# Patient Record
Sex: FEMALE | Race: WHITE | URBAN - METROPOLITAN AREA
[De-identification: names, ages, dates, MRNs, and addresses within clinical notes are randomized per-mention and may not be internally consistent; named-entity substitution may affect disease eponyms.]

---

## 2022-12-22 ENCOUNTER — OFFICE VISIT (OUTPATIENT)
Dept: FAMILY MEDICINE CLINIC | Facility: CLINIC | Age: 57
End: 2022-12-22
Payer: MEDICAID

## 2022-12-22 VITALS
HEART RATE: 73 BPM | WEIGHT: 170 LBS | SYSTOLIC BLOOD PRESSURE: 128 MMHG | DIASTOLIC BLOOD PRESSURE: 75 MMHG | TEMPERATURE: 99 F | RESPIRATION RATE: 18 BRPM | OXYGEN SATURATION: 98 % | BODY MASS INDEX: 32.1 KG/M2 | HEIGHT: 61 IN

## 2022-12-22 DIAGNOSIS — N39.0 URINARY TRACT INFECTION WITHOUT HEMATURIA, SITE UNSPECIFIED: Primary | ICD-10-CM

## 2022-12-22 LAB
APPEARANCE: YELLOW
BILIRUBIN: NEGATIVE
GLUCOSE (URINE DIPSTICK): NEGATIVE MG/DL
KETONES (URINE DIPSTICK): NEGATIVE MG/DL
MULTISTIX LOT#: ABNORMAL NUMERIC
NITRITE, URINE: NEGATIVE
OCCULT BLOOD: NEGATIVE
PH, URINE: 7 (ref 4.5–8)
PROTEIN (URINE DIPSTICK): NEGATIVE MG/DL
SPECIFIC GRAVITY: 1.02 (ref 1–1.03)
URINE-COLOR: CLEAR
UROBILINOGEN,SEMI-QN: 0.2 MG/DL (ref 0–1.9)

## 2022-12-22 PROCEDURE — 87186 SC STD MICRODIL/AGAR DIL: CPT | Performed by: PHYSICIAN ASSISTANT

## 2022-12-22 PROCEDURE — 87086 URINE CULTURE/COLONY COUNT: CPT | Performed by: PHYSICIAN ASSISTANT

## 2022-12-22 PROCEDURE — 3074F SYST BP LT 130 MM HG: CPT | Performed by: PHYSICIAN ASSISTANT

## 2022-12-22 PROCEDURE — 3008F BODY MASS INDEX DOCD: CPT | Performed by: PHYSICIAN ASSISTANT

## 2022-12-22 PROCEDURE — 3078F DIAST BP <80 MM HG: CPT | Performed by: PHYSICIAN ASSISTANT

## 2022-12-22 PROCEDURE — 87077 CULTURE AEROBIC IDENTIFY: CPT | Performed by: PHYSICIAN ASSISTANT

## 2022-12-22 PROCEDURE — 81003 URINALYSIS AUTO W/O SCOPE: CPT | Performed by: PHYSICIAN ASSISTANT

## 2022-12-22 PROCEDURE — 99203 OFFICE O/P NEW LOW 30 MIN: CPT | Performed by: PHYSICIAN ASSISTANT

## 2022-12-22 RX ORDER — MULTIVIT-MIN/IRON FUM/FOLIC AC 7.5 MG-4
1 TABLET ORAL DAILY
COMMUNITY

## 2022-12-22 RX ORDER — TRAZODONE HYDROCHLORIDE 300 MG/1
50 TABLET ORAL NIGHTLY
COMMUNITY

## 2022-12-22 RX ORDER — ZINC SULFATE 50(220)MG
25 CAPSULE ORAL DAILY
COMMUNITY

## 2022-12-22 RX ORDER — LANSOPRAZOLE 15 MG/1
15 CAPSULE, DELAYED RELEASE ORAL DAILY
COMMUNITY

## 2022-12-22 RX ORDER — RIBOFLAVIN (VITAMIN B2) 100 MG
100 TABLET ORAL DAILY
COMMUNITY

## 2022-12-22 RX ORDER — ALPRAZOLAM 0.5 MG/1
0.5 TABLET ORAL AS NEEDED
COMMUNITY

## 2022-12-22 RX ORDER — NITROFURANTOIN 25; 75 MG/1; MG/1
100 CAPSULE ORAL 2 TIMES DAILY
Qty: 10 CAPSULE | Refills: 0 | Status: SHIPPED | OUTPATIENT
Start: 2022-12-22 | End: 2022-12-27

## 2022-12-22 RX ORDER — MONTELUKAST SODIUM 10 MG/1
10 TABLET ORAL NIGHTLY
COMMUNITY

## 2022-12-22 RX ORDER — LEVOTHYROXINE SODIUM 0.1 MG/1
100 TABLET ORAL
COMMUNITY

## 2022-12-22 RX ORDER — FLUTICASONE PROPIONATE AND SALMETEROL 250; 50 UG/1; UG/1
1 POWDER RESPIRATORY (INHALATION) 2 TIMES DAILY
COMMUNITY

## 2022-12-22 RX ORDER — ESTRADIOL 0.03 MG/D
1 FILM, EXTENDED RELEASE TRANSDERMAL
COMMUNITY

## 2022-12-22 RX ORDER — DICYCLOMINE HYDROCHLORIDE 10 MG/5ML
20 SOLUTION ORAL
COMMUNITY

## 2022-12-22 RX ORDER — OMEGA-3S/DHA/EPA/FISH OIL/D3 1150-1000
LIQUID (ML) ORAL DAILY
COMMUNITY

## 2022-12-22 RX ORDER — LOSARTAN POTASSIUM 50 MG/1
TABLET ORAL 2 TIMES DAILY
COMMUNITY

## 2022-12-22 RX ORDER — VALACYCLOVIR HYDROCHLORIDE 1 G/1
1 TABLET, FILM COATED ORAL EVERY 12 HOURS SCHEDULED
COMMUNITY

## 2022-12-22 RX ORDER — ESCITALOPRAM OXALATE 20 MG/1
20 TABLET ORAL DAILY
COMMUNITY

## 2022-12-22 RX ORDER — FEXOFENADINE HCL 180 MG/1
180 TABLET ORAL 2 TIMES DAILY
COMMUNITY

## 2022-12-22 NOTE — PATIENT INSTRUCTIONS
1   Preliminary urinalysis with signs of infection. Urine culture pending, results will be available in 2-3 days. 2.  Macrobid (nitrofurantoin) 100 mg twice daily for 5 days.

## 2022-12-27 DIAGNOSIS — N30.01 ACUTE CYSTITIS WITH HEMATURIA: Primary | ICD-10-CM

## 2022-12-27 RX ORDER — NITROFURANTOIN 25; 75 MG/1; MG/1
100 CAPSULE ORAL 2 TIMES DAILY
Qty: 4 CAPSULE | Refills: 0 | Status: SHIPPED | OUTPATIENT
Start: 2022-12-27 | End: 2022-12-29

## 2023-01-08 ENCOUNTER — OFFICE VISIT (OUTPATIENT)
Dept: FAMILY MEDICINE CLINIC | Facility: CLINIC | Age: 58
End: 2023-01-08
Payer: MEDICAID

## 2023-01-08 VITALS
OXYGEN SATURATION: 98 % | HEIGHT: 61 IN | RESPIRATION RATE: 16 BRPM | TEMPERATURE: 97 F | SYSTOLIC BLOOD PRESSURE: 108 MMHG | WEIGHT: 162 LBS | BODY MASS INDEX: 30.58 KG/M2 | DIASTOLIC BLOOD PRESSURE: 70 MMHG | HEART RATE: 80 BPM

## 2023-01-08 DIAGNOSIS — R39.9 UTI SYMPTOMS: Primary | ICD-10-CM

## 2023-01-08 LAB
APPEARANCE: CLEAR
BILIRUBIN: NEGATIVE
GLUCOSE (URINE DIPSTICK): NEGATIVE MG/DL
KETONES (URINE DIPSTICK): 15 MG/DL
MULTISTIX LOT#: ABNORMAL NUMERIC
NITRITE, URINE: NEGATIVE
PH, URINE: 5.5 (ref 4.5–8)
PROTEIN (URINE DIPSTICK): 100 MG/DL
SPECIFIC GRAVITY: 1.02 (ref 1–1.03)
URINE-COLOR: YELLOW
UROBILINOGEN,SEMI-QN: 0.2 MG/DL (ref 0–1.9)

## 2023-01-08 PROCEDURE — 3008F BODY MASS INDEX DOCD: CPT | Performed by: NURSE PRACTITIONER

## 2023-01-08 PROCEDURE — 87186 SC STD MICRODIL/AGAR DIL: CPT | Performed by: NURSE PRACTITIONER

## 2023-01-08 PROCEDURE — 99213 OFFICE O/P EST LOW 20 MIN: CPT | Performed by: NURSE PRACTITIONER

## 2023-01-08 PROCEDURE — 87077 CULTURE AEROBIC IDENTIFY: CPT | Performed by: NURSE PRACTITIONER

## 2023-01-08 PROCEDURE — 3078F DIAST BP <80 MM HG: CPT | Performed by: NURSE PRACTITIONER

## 2023-01-08 PROCEDURE — 87086 URINE CULTURE/COLONY COUNT: CPT | Performed by: NURSE PRACTITIONER

## 2023-01-08 PROCEDURE — 3074F SYST BP LT 130 MM HG: CPT | Performed by: NURSE PRACTITIONER

## 2023-01-08 PROCEDURE — 81003 URINALYSIS AUTO W/O SCOPE: CPT | Performed by: NURSE PRACTITIONER

## 2023-01-08 RX ORDER — CIPROFLOXACIN 500 MG/1
500 TABLET, FILM COATED ORAL 2 TIMES DAILY
Qty: 14 TABLET | Refills: 0 | Status: SHIPPED | OUTPATIENT
Start: 2023-01-08 | End: 2023-01-15

## 2023-01-08 RX ORDER — PHENAZOPYRIDINE HYDROCHLORIDE 200 MG/1
200 TABLET, FILM COATED ORAL 3 TIMES DAILY PRN
Qty: 15 TABLET | Refills: 0 | Status: SHIPPED | OUTPATIENT
Start: 2023-01-08 | End: 2023-01-13

## 2023-01-08 RX ORDER — FLUCONAZOLE 150 MG/1
150 TABLET ORAL ONCE
Qty: 1 TABLET | Refills: 0 | Status: SHIPPED | OUTPATIENT
Start: 2023-01-08 | End: 2023-01-08

## 2023-02-01 ENCOUNTER — OFFICE VISIT (OUTPATIENT)
Dept: FAMILY MEDICINE CLINIC | Facility: CLINIC | Age: 58
End: 2023-02-01
Payer: MEDICAID

## 2023-02-01 VITALS
HEIGHT: 61 IN | DIASTOLIC BLOOD PRESSURE: 70 MMHG | BODY MASS INDEX: 29.45 KG/M2 | RESPIRATION RATE: 16 BRPM | TEMPERATURE: 97 F | OXYGEN SATURATION: 99 % | HEART RATE: 99 BPM | SYSTOLIC BLOOD PRESSURE: 126 MMHG | WEIGHT: 156 LBS

## 2023-02-01 DIAGNOSIS — F43.9 STRESS AT HOME: ICD-10-CM

## 2023-02-01 DIAGNOSIS — E03.9 HYPOTHYROIDISM, UNSPECIFIED TYPE: ICD-10-CM

## 2023-02-01 DIAGNOSIS — F33.1 MAJOR DEPRESSIVE DISORDER, RECURRENT EPISODE, MODERATE WITH ANXIOUS DISTRESS (HCC): ICD-10-CM

## 2023-02-01 DIAGNOSIS — Z78.0 POSTMENOPAUSAL: ICD-10-CM

## 2023-02-01 DIAGNOSIS — Z00.00 ROUTINE GENERAL MEDICAL EXAMINATION AT A HEALTH CARE FACILITY: Primary | ICD-10-CM

## 2023-02-01 DIAGNOSIS — J45.40 MODERATE PERSISTENT ASTHMA WITHOUT COMPLICATION: ICD-10-CM

## 2023-02-01 DIAGNOSIS — I10 ESSENTIAL HYPERTENSION: ICD-10-CM

## 2023-02-01 PROBLEM — K21.9 GASTROESOPHAGEAL REFLUX DISEASE: Status: ACTIVE | Noted: 2023-02-01

## 2023-02-01 PROBLEM — H69.82 DYSFUNCTION OF LEFT EUSTACHIAN TUBE: Status: ACTIVE | Noted: 2023-02-01

## 2023-02-01 PROBLEM — J30.81 ALLERGIC RHINITIS DUE TO ANIMAL HAIR AND DANDER: Status: ACTIVE | Noted: 2023-02-01

## 2023-02-01 PROBLEM — M26.629 ARTHRALGIA OF TEMPOROMANDIBULAR JOINT: Status: ACTIVE | Noted: 2023-02-01

## 2023-02-01 PROBLEM — H69.92 DYSFUNCTION OF LEFT EUSTACHIAN TUBE: Status: ACTIVE | Noted: 2023-02-01

## 2023-02-01 PROBLEM — J30.1 ALLERGIC RHINITIS DUE TO POLLEN: Status: ACTIVE | Noted: 2023-02-01

## 2023-02-01 PROBLEM — J31.0 CHRONIC RHINITIS: Status: ACTIVE | Noted: 2023-02-01

## 2023-02-01 PROCEDURE — 3008F BODY MASS INDEX DOCD: CPT | Performed by: FAMILY MEDICINE

## 2023-02-01 PROCEDURE — 99386 PREV VISIT NEW AGE 40-64: CPT | Performed by: FAMILY MEDICINE

## 2023-02-01 PROCEDURE — 3074F SYST BP LT 130 MM HG: CPT | Performed by: FAMILY MEDICINE

## 2023-02-01 PROCEDURE — 3078F DIAST BP <80 MM HG: CPT | Performed by: FAMILY MEDICINE

## 2023-02-01 PROCEDURE — 99214 OFFICE O/P EST MOD 30 MIN: CPT | Performed by: FAMILY MEDICINE

## 2023-02-01 RX ORDER — FLUTICASONE PROPIONATE 50 MCG
2 SPRAY, SUSPENSION (ML) NASAL DAILY
COMMUNITY

## 2023-02-01 RX ORDER — BUPROPION HYDROCHLORIDE 150 MG/1
150 TABLET, EXTENDED RELEASE ORAL 2 TIMES DAILY
COMMUNITY
End: 2023-02-01

## 2023-02-01 RX ORDER — NICOTINE POLACRILEX 4 MG/1
GUM, CHEWING ORAL
COMMUNITY
Start: 2020-10-29 | End: 2023-02-01 | Stop reason: CLARIF

## 2023-02-01 RX ORDER — FLUTICASONE PROPIONATE AND SALMETEROL 250; 50 UG/1; UG/1
1 POWDER RESPIRATORY (INHALATION) EVERY 12 HOURS SCHEDULED
COMMUNITY

## 2023-02-01 RX ORDER — LOSARTAN POTASSIUM 50 MG/1
50 TABLET ORAL 2 TIMES DAILY
Qty: 180 TABLET | Refills: 0 | Status: SHIPPED | OUTPATIENT
Start: 2023-02-01

## 2023-02-01 RX ORDER — BUPROPION HYDROCHLORIDE 300 MG/1
300 TABLET ORAL DAILY
Qty: 30 TABLET | Refills: 1 | Status: SHIPPED | OUTPATIENT
Start: 2023-02-01 | End: 2023-02-01

## 2023-02-01 RX ORDER — TRAZODONE HYDROCHLORIDE 150 MG/1
150 TABLET ORAL NIGHTLY
Qty: 30 TABLET | Refills: 1 | Status: SHIPPED | OUTPATIENT
Start: 2023-02-01

## 2023-02-01 RX ORDER — LEVOTHYROXINE SODIUM 0.1 MG/1
100 TABLET ORAL
Qty: 30 TABLET | Refills: 1 | Status: SHIPPED | OUTPATIENT
Start: 2023-02-01

## 2023-02-01 RX ORDER — BUPROPION HYDROCHLORIDE 150 MG/1
150 TABLET ORAL DAILY
COMMUNITY
End: 2023-02-19 | Stop reason: CLARIF

## 2023-02-02 RX ORDER — TRAZODONE HYDROCHLORIDE 150 MG/1
TABLET ORAL
Qty: 90 TABLET | Refills: 0 | OUTPATIENT
Start: 2023-02-02

## 2023-02-02 RX ORDER — LEVOTHYROXINE SODIUM 0.1 MG/1
TABLET ORAL
Qty: 90 TABLET | Refills: 0 | OUTPATIENT
Start: 2023-02-02

## 2023-02-10 ENCOUNTER — LAB ENCOUNTER (OUTPATIENT)
Dept: LAB | Age: 58
End: 2023-02-10
Attending: FAMILY MEDICINE
Payer: MEDICAID

## 2023-02-10 DIAGNOSIS — I10 ESSENTIAL HYPERTENSION: ICD-10-CM

## 2023-02-10 DIAGNOSIS — E03.9 HYPOTHYROIDISM, UNSPECIFIED TYPE: ICD-10-CM

## 2023-02-10 LAB
ALBUMIN SERPL-MCNC: 3.7 G/DL (ref 3.4–5)
ALBUMIN/GLOB SERPL: 1.2 {RATIO} (ref 1–2)
ALP LIVER SERPL-CCNC: 81 U/L
ALT SERPL-CCNC: 33 U/L
ANION GAP SERPL CALC-SCNC: 8 MMOL/L (ref 0–18)
AST SERPL-CCNC: 17 U/L (ref 15–37)
BASOPHILS # BLD AUTO: 0.06 X10(3) UL (ref 0–0.2)
BASOPHILS NFR BLD AUTO: 1.1 %
BILIRUB SERPL-MCNC: 0.5 MG/DL (ref 0.1–2)
BUN BLD-MCNC: 9 MG/DL (ref 7–18)
CALCIUM BLD-MCNC: 9.7 MG/DL (ref 8.5–10.1)
CHLORIDE SERPL-SCNC: 107 MMOL/L (ref 98–112)
CHOLEST SERPL-MCNC: 226 MG/DL (ref ?–200)
CO2 SERPL-SCNC: 27 MMOL/L (ref 21–32)
CREAT BLD-MCNC: 1.07 MG/DL
EOSINOPHIL # BLD AUTO: 0.11 X10(3) UL (ref 0–0.7)
EOSINOPHIL NFR BLD AUTO: 2 %
ERYTHROCYTE [DISTWIDTH] IN BLOOD BY AUTOMATED COUNT: 13.2 %
FASTING PATIENT LIPID ANSWER: YES
FASTING STATUS PATIENT QL REPORTED: YES
GFR SERPLBLD BASED ON 1.73 SQ M-ARVRAT: 60 ML/MIN/1.73M2 (ref 60–?)
GLOBULIN PLAS-MCNC: 3.2 G/DL (ref 2.8–4.4)
GLUCOSE BLD-MCNC: 102 MG/DL (ref 70–99)
HCT VFR BLD AUTO: 39.1 %
HDLC SERPL-MCNC: 56 MG/DL (ref 40–59)
HGB BLD-MCNC: 13.3 G/DL
IMM GRANULOCYTES # BLD AUTO: 0.01 X10(3) UL (ref 0–1)
IMM GRANULOCYTES NFR BLD: 0.2 %
LDLC SERPL CALC-MCNC: 148 MG/DL (ref ?–100)
LYMPHOCYTES # BLD AUTO: 0.98 X10(3) UL (ref 1–4)
LYMPHOCYTES NFR BLD AUTO: 17.5 %
MCH RBC QN AUTO: 31.4 PG (ref 26–34)
MCHC RBC AUTO-ENTMCNC: 34 G/DL (ref 31–37)
MCV RBC AUTO: 92.4 FL
MONOCYTES # BLD AUTO: 0.33 X10(3) UL (ref 0.1–1)
MONOCYTES NFR BLD AUTO: 5.9 %
NEUTROPHILS # BLD AUTO: 4.11 X10 (3) UL (ref 1.5–7.7)
NEUTROPHILS # BLD AUTO: 4.11 X10(3) UL (ref 1.5–7.7)
NEUTROPHILS NFR BLD AUTO: 73.3 %
NONHDLC SERPL-MCNC: 170 MG/DL (ref ?–130)
OSMOLALITY SERPL CALC.SUM OF ELEC: 293 MOSM/KG (ref 275–295)
PLATELET # BLD AUTO: 259 10(3)UL (ref 150–450)
POTASSIUM SERPL-SCNC: 4.1 MMOL/L (ref 3.5–5.1)
PROT SERPL-MCNC: 6.9 G/DL (ref 6.4–8.2)
RBC # BLD AUTO: 4.23 X10(6)UL
SODIUM SERPL-SCNC: 142 MMOL/L (ref 136–145)
T4 FREE SERPL-MCNC: 1.4 NG/DL (ref 0.8–1.7)
THYROGLOB SERPL-MCNC: <15 U/ML (ref ?–60)
THYROPEROXIDASE AB SERPL-ACNC: 60 U/ML (ref ?–60)
TRIGL SERPL-MCNC: 125 MG/DL (ref 30–149)
TSI SER-ACNC: 0.15 MIU/ML (ref 0.36–3.74)
VLDLC SERPL CALC-MCNC: 24 MG/DL (ref 0–30)
WBC # BLD AUTO: 5.6 X10(3) UL (ref 4–11)

## 2023-02-10 PROCEDURE — 84439 ASSAY OF FREE THYROXINE: CPT

## 2023-02-10 PROCEDURE — 36415 COLL VENOUS BLD VENIPUNCTURE: CPT

## 2023-02-10 PROCEDURE — 84443 ASSAY THYROID STIM HORMONE: CPT

## 2023-02-10 PROCEDURE — 80061 LIPID PANEL: CPT

## 2023-02-10 PROCEDURE — 86376 MICROSOMAL ANTIBODY EACH: CPT

## 2023-02-10 PROCEDURE — 85025 COMPLETE CBC W/AUTO DIFF WBC: CPT

## 2023-02-10 PROCEDURE — 86800 THYROGLOBULIN ANTIBODY: CPT

## 2023-02-10 PROCEDURE — 80053 COMPREHEN METABOLIC PANEL: CPT

## 2023-02-17 ENCOUNTER — HOSPITAL ENCOUNTER (OUTPATIENT)
Dept: BONE DENSITY | Age: 58
Discharge: HOME | End: 2023-02-17
Attending: FAMILY MEDICINE
Payer: MEDICAID

## 2023-02-17 DIAGNOSIS — Z78.0 POSTMENOPAUSAL: ICD-10-CM

## 2023-02-17 PROCEDURE — 77080 DXA BONE DENSITY AXIAL: CPT | Performed by: FAMILY MEDICINE

## 2023-02-19 PROBLEM — F43.9 STRESS AT HOME: Status: ACTIVE | Noted: 2023-02-19

## 2023-02-19 PROBLEM — J45.40 MODERATE PERSISTENT ASTHMA WITHOUT COMPLICATION (HCC): Status: ACTIVE | Noted: 2023-02-19

## 2023-02-19 PROBLEM — F33.1 MAJOR DEPRESSIVE DISORDER, RECURRENT EPISODE, MODERATE WITH ANXIOUS DISTRESS (HCC): Status: ACTIVE | Noted: 2023-02-19

## 2023-02-19 PROBLEM — I10 ESSENTIAL HYPERTENSION: Status: ACTIVE | Noted: 2023-02-19

## 2023-02-19 PROBLEM — E03.9 HYPOTHYROIDISM: Status: ACTIVE | Noted: 2023-02-19

## 2023-02-19 PROBLEM — J45.40 MODERATE PERSISTENT ASTHMA WITHOUT COMPLICATION: Status: ACTIVE | Noted: 2023-02-19

## 2023-02-19 PROBLEM — Z78.0 POSTMENOPAUSAL: Status: ACTIVE | Noted: 2023-02-19

## 2023-02-19 RX ORDER — BUPROPION HYDROCHLORIDE 300 MG/1
300 TABLET ORAL DAILY
Qty: 30 TABLET | Refills: 1 | COMMUNITY
Start: 2023-02-01

## 2023-02-21 DIAGNOSIS — F43.9 STRESS AT HOME: ICD-10-CM

## 2023-02-21 DIAGNOSIS — F33.1 MAJOR DEPRESSIVE DISORDER, RECURRENT EPISODE, MODERATE WITH ANXIOUS DISTRESS (HCC): ICD-10-CM

## 2023-02-21 NOTE — TELEPHONE ENCOUNTER
Patient calling is having trouble getting her rx     buPROPion  MG Oral Tablet 24 Hr    Patient states she never picked up 2/1 and has not taken this medication in 2 days

## 2023-02-21 NOTE — TELEPHONE ENCOUNTER
Per Maycol, prescription was picked up on 2/1/23, too soon for refill until 2/24/23. Spoke with patient, doesn't remember picking prescription up on 2/1/23 but says she not sure, she has a lot going on. Will look for medication at home, to call back if she doesn't find it.

## 2023-02-22 RX ORDER — BUPROPION HYDROCHLORIDE 300 MG/1
300 TABLET ORAL DAILY
Qty: 2 TABLET | Refills: 0 | Status: SHIPPED | OUTPATIENT
Start: 2023-02-22

## 2023-02-22 NOTE — TELEPHONE ENCOUNTER
Andrea Mortensen calling back states her pharmacy will give her medication for 2 pills but will need a rx sent

## 2023-02-22 NOTE — TELEPHONE ENCOUNTER
Patient misplaced medication she picked up on 2/1/23 (Bupropion). Pharmacy will give her 2 replacement pills until 2/24/23 when she can fill the full prescription. Prescription needed. Please advise. Prescription pended.

## 2023-02-25 DIAGNOSIS — F33.1 MAJOR DEPRESSIVE DISORDER, RECURRENT EPISODE, MODERATE WITH ANXIOUS DISTRESS (HCC): ICD-10-CM

## 2023-02-25 DIAGNOSIS — F43.9 STRESS AT HOME: ICD-10-CM

## 2023-02-27 RX ORDER — BUPROPION HYDROCHLORIDE 300 MG/1
TABLET ORAL
Qty: 30 TABLET | Refills: 0 | OUTPATIENT
Start: 2023-02-27

## 2023-03-01 ENCOUNTER — TELEPHONE (OUTPATIENT)
Dept: FAMILY MEDICINE CLINIC | Facility: CLINIC | Age: 58
End: 2023-03-01

## 2023-03-01 NOTE — TELEPHONE ENCOUNTER
Received medical records from Dr. Vaishnavi Monteiro, pulmonologist, DOS 11/17/22    Current medications:    Singulair 10 mg once daily  Advair Diskus 250/50 1 inhalation daily  ProAir HFA 2 puffs as needed every 4 hours  DuoNeb 3 mL via nebulizer every 4 hours as needed  Flonase 1 spray to each nostril once daily  Allegra 180 mg twice daily  Prevacid 15 mg 1 capsule AC once daily    Most recent spirometry was also done on 11/17/2022. Her spirometry has now normalized.

## 2023-03-06 ENCOUNTER — MED REC SCAN ONLY (OUTPATIENT)
Dept: FAMILY MEDICINE CLINIC | Facility: CLINIC | Age: 58
End: 2023-03-06

## 2023-03-06 PROBLEM — N28.9 RENAL INSUFFICIENCY: Status: ACTIVE | Noted: 2023-03-06

## 2023-03-06 PROBLEM — M54.50 PAIN IN LEFT LUMBAR REGION OF BACK: Status: ACTIVE | Noted: 2023-03-06

## 2023-03-06 PROBLEM — R79.89 ELEVATED SERUM CREATININE: Status: ACTIVE | Noted: 2023-03-06

## 2023-03-06 PROBLEM — R73.01 IMPAIRED FASTING GLUCOSE: Status: ACTIVE | Noted: 2023-03-06

## 2023-03-06 PROBLEM — E78.6 HYPOCHOLESTEROLEMIA: Status: ACTIVE | Noted: 2023-03-06

## 2023-03-16 DIAGNOSIS — F33.1 MAJOR DEPRESSIVE DISORDER, RECURRENT EPISODE, MODERATE WITH ANXIOUS DISTRESS (HCC): ICD-10-CM

## 2023-03-16 DIAGNOSIS — F43.9 STRESS AT HOME: ICD-10-CM

## 2023-03-17 RX ORDER — TRAZODONE HYDROCHLORIDE 150 MG/1
150 TABLET ORAL NIGHTLY PRN
Qty: 90 TABLET | Refills: 0 | OUTPATIENT
Start: 2023-03-17

## 2023-03-20 DIAGNOSIS — F33.1 MAJOR DEPRESSIVE DISORDER, RECURRENT EPISODE, MODERATE WITH ANXIOUS DISTRESS (HCC): ICD-10-CM

## 2023-03-20 DIAGNOSIS — F43.9 STRESS AT HOME: ICD-10-CM

## 2023-03-21 ENCOUNTER — TELEPHONE (OUTPATIENT)
Dept: FAMILY MEDICINE CLINIC | Facility: CLINIC | Age: 58
End: 2023-03-21

## 2023-03-21 RX ORDER — BUPROPION HYDROCHLORIDE 300 MG/1
300 TABLET ORAL DAILY
Qty: 90 TABLET | Refills: 0 | OUTPATIENT
Start: 2023-03-21

## 2023-03-21 NOTE — TELEPHONE ENCOUNTER
Patient informed last refill sent on 3/6/23. Confirmed with Maycol the prescription was received but not due for refill until 3/24/23. Patient has 3 pills left, which will cover her until the 24th. Patient instructed to call pharmacy on 3/24, verbalized understanding.

## 2023-03-21 NOTE — TELEPHONE ENCOUNTER
Patient calling sent my chart message 3/20 but got a response too early only has 3 pills left     Bupropion

## 2023-04-07 ENCOUNTER — TELEPHONE (OUTPATIENT)
Dept: FAMILY MEDICINE CLINIC | Facility: CLINIC | Age: 58
End: 2023-04-07

## 2023-04-10 RX ORDER — CONJUGATED ESTROGENS 0.62 MG/G
0.5 CREAM VAGINAL
Qty: 1 EACH | Refills: 2 | Status: SHIPPED | OUTPATIENT
Start: 2023-04-10

## 2023-04-10 NOTE — TELEPHONE ENCOUNTER
Estradiol vaginal suppositories denied by plan. Plan states must have tried and failed two preferred drugs. Preferred drugs are estradiol vaginal cream and Premarin vaginal cream.  Please advise.

## 2023-05-19 ENCOUNTER — OFFICE VISIT (OUTPATIENT)
Dept: FAMILY MEDICINE CLINIC | Facility: CLINIC | Age: 58
End: 2023-05-19
Payer: MEDICAID

## 2023-05-19 VITALS
TEMPERATURE: 98 F | DIASTOLIC BLOOD PRESSURE: 82 MMHG | SYSTOLIC BLOOD PRESSURE: 128 MMHG | OXYGEN SATURATION: 98 % | WEIGHT: 132 LBS | HEART RATE: 78 BPM | HEIGHT: 62 IN | BODY MASS INDEX: 24.29 KG/M2 | RESPIRATION RATE: 18 BRPM

## 2023-05-19 DIAGNOSIS — R39.9 UTI SYMPTOMS: Primary | ICD-10-CM

## 2023-05-19 LAB
APPEARANCE: CLEAR
BILIRUBIN: NEGATIVE
GLUCOSE (URINE DIPSTICK): NEGATIVE MG/DL
KETONES (URINE DIPSTICK): NEGATIVE MG/DL
MULTISTIX LOT#: ABNORMAL NUMERIC
NITRITE, URINE: NEGATIVE
OCCULT BLOOD: NEGATIVE
PH, URINE: 6 (ref 4.5–8)
PROTEIN (URINE DIPSTICK): NEGATIVE MG/DL
SPECIFIC GRAVITY: 1 (ref 1–1.03)
URINE-COLOR: YELLOW
UROBILINOGEN,SEMI-QN: 0.2 MG/DL (ref 0–1.9)

## 2023-05-19 PROCEDURE — 81003 URINALYSIS AUTO W/O SCOPE: CPT | Performed by: NURSE PRACTITIONER

## 2023-05-19 PROCEDURE — 3079F DIAST BP 80-89 MM HG: CPT | Performed by: NURSE PRACTITIONER

## 2023-05-19 PROCEDURE — 3008F BODY MASS INDEX DOCD: CPT | Performed by: NURSE PRACTITIONER

## 2023-05-19 PROCEDURE — 87086 URINE CULTURE/COLONY COUNT: CPT | Performed by: NURSE PRACTITIONER

## 2023-05-19 PROCEDURE — 99213 OFFICE O/P EST LOW 20 MIN: CPT | Performed by: NURSE PRACTITIONER

## 2023-05-19 PROCEDURE — 3074F SYST BP LT 130 MM HG: CPT | Performed by: NURSE PRACTITIONER

## 2023-05-19 RX ORDER — SULFAMETHOXAZOLE AND TRIMETHOPRIM 800; 160 MG/1; MG/1
1 TABLET ORAL 2 TIMES DAILY
Qty: 10 TABLET | Refills: 0 | Status: SHIPPED | OUTPATIENT
Start: 2023-05-19 | End: 2023-05-24

## 2023-05-19 RX ORDER — PHENAZOPYRIDINE HYDROCHLORIDE 200 MG/1
200 TABLET, FILM COATED ORAL 3 TIMES DAILY PRN
Qty: 6 TABLET | Refills: 0 | Status: SHIPPED | OUTPATIENT
Start: 2023-05-19 | End: 2023-05-21

## 2023-05-19 RX ORDER — ESTRADIOL 10 UG/1
1 INSERT VAGINAL
COMMUNITY
Start: 2023-04-11

## 2023-05-29 DIAGNOSIS — E03.9 ACQUIRED HYPOTHYROIDISM: ICD-10-CM

## 2023-05-30 RX ORDER — LEVOTHYROXINE SODIUM 88 UG/1
TABLET ORAL
Qty: 30 TABLET | Refills: 0 | Status: SHIPPED | OUTPATIENT
Start: 2023-05-30

## 2023-05-30 NOTE — TELEPHONE ENCOUNTER
Pt calling regarding refill on Thyroid medication. Pt will run out prior to her appt and asking for enough medication to get her to her appt with Dr. Josse Wyatt.     Future Appointments   Date Time Provider Bonnie Geronimo   6/15/2023 11:00 AM Damion Salgado, DO [facility]

## 2023-06-03 DIAGNOSIS — F33.1 MAJOR DEPRESSIVE DISORDER, RECURRENT EPISODE, MODERATE WITH ANXIOUS DISTRESS (HCC): ICD-10-CM

## 2023-06-03 DIAGNOSIS — F43.9 STRESS AT HOME: ICD-10-CM

## 2023-06-03 DIAGNOSIS — J30.1 ALLERGIC RHINITIS DUE TO POLLEN, UNSPECIFIED SEASONALITY: ICD-10-CM

## 2023-06-03 DIAGNOSIS — J45.40 MODERATE PERSISTENT ASTHMA WITHOUT COMPLICATION: ICD-10-CM

## 2023-06-05 RX ORDER — TRAZODONE HYDROCHLORIDE 150 MG/1
TABLET ORAL
Qty: 30 TABLET | Refills: 0 | Status: SHIPPED | OUTPATIENT
Start: 2023-06-05

## 2023-06-05 RX ORDER — MONTELUKAST SODIUM 10 MG/1
TABLET ORAL
Qty: 90 TABLET | Refills: 0 | Status: SHIPPED | OUTPATIENT
Start: 2023-06-05

## 2023-06-08 ENCOUNTER — HOSPITAL ENCOUNTER (OUTPATIENT)
Dept: CV DIAGNOSTICS | Age: 58
End: 2023-06-08
Payer: MEDICAID

## 2023-06-08 ENCOUNTER — LAB ENCOUNTER (OUTPATIENT)
Dept: LAB | Age: 58
End: 2023-06-08
Attending: FAMILY MEDICINE
Payer: MEDICAID

## 2023-06-08 DIAGNOSIS — E03.9 ACQUIRED HYPOTHYROIDISM: ICD-10-CM

## 2023-06-08 DIAGNOSIS — I10 ESSENTIAL HYPERTENSION: ICD-10-CM

## 2023-06-08 DIAGNOSIS — N28.9 RENAL INSUFFICIENCY: ICD-10-CM

## 2023-06-08 DIAGNOSIS — E78.6 HYPOCHOLESTEROLEMIA: ICD-10-CM

## 2023-06-08 DIAGNOSIS — R79.89 ELEVATED SERUM CREATININE: ICD-10-CM

## 2023-06-08 LAB
ANION GAP SERPL CALC-SCNC: 6 MMOL/L (ref 0–18)
BUN BLD-MCNC: 6 MG/DL (ref 7–18)
CALCIUM BLD-MCNC: 9.5 MG/DL (ref 8.5–10.1)
CHLORIDE SERPL-SCNC: 104 MMOL/L (ref 98–112)
CHOLEST SERPL-MCNC: 213 MG/DL (ref ?–200)
CO2 SERPL-SCNC: 27 MMOL/L (ref 21–32)
CREAT BLD-MCNC: 1.03 MG/DL
FASTING PATIENT LIPID ANSWER: YES
FASTING STATUS PATIENT QL REPORTED: YES
GFR SERPLBLD BASED ON 1.73 SQ M-ARVRAT: 63 ML/MIN/1.73M2 (ref 60–?)
GLUCOSE BLD-MCNC: 81 MG/DL (ref 70–99)
HDLC SERPL-MCNC: 68 MG/DL (ref 40–59)
LDLC SERPL CALC-MCNC: 124 MG/DL (ref ?–100)
NONHDLC SERPL-MCNC: 145 MG/DL (ref ?–130)
OSMOLALITY SERPL CALC.SUM OF ELEC: 281 MOSM/KG (ref 275–295)
POTASSIUM SERPL-SCNC: 3.9 MMOL/L (ref 3.5–5.1)
SODIUM SERPL-SCNC: 137 MMOL/L (ref 136–145)
T4 FREE SERPL-MCNC: 1.4 NG/DL (ref 0.8–1.7)
TRIGL SERPL-MCNC: 120 MG/DL (ref 30–149)
TSI SER-ACNC: 0.76 MIU/ML (ref 0.36–3.74)
VLDLC SERPL CALC-MCNC: 21 MG/DL (ref 0–30)

## 2023-06-08 PROCEDURE — 80048 BASIC METABOLIC PNL TOTAL CA: CPT

## 2023-06-08 PROCEDURE — 84439 ASSAY OF FREE THYROXINE: CPT

## 2023-06-08 PROCEDURE — 84443 ASSAY THYROID STIM HORMONE: CPT

## 2023-06-08 PROCEDURE — 36415 COLL VENOUS BLD VENIPUNCTURE: CPT

## 2023-06-08 PROCEDURE — 80061 LIPID PANEL: CPT

## 2023-06-08 RX ORDER — LOSARTAN POTASSIUM 50 MG/1
50 TABLET ORAL 2 TIMES DAILY
Qty: 180 TABLET | Refills: 0 | Status: SHIPPED | OUTPATIENT
Start: 2023-06-08

## 2023-06-14 RX ORDER — ALBUTEROL SULFATE 90 UG/1
2 AEROSOL, METERED RESPIRATORY (INHALATION) EVERY 4 HOURS PRN
COMMUNITY
Start: 2023-04-06

## 2023-06-14 RX ORDER — PREDNISONE 20 MG/1
TABLET ORAL
COMMUNITY
Start: 2023-04-06 | End: 2023-06-15

## 2023-06-15 ENCOUNTER — OFFICE VISIT (OUTPATIENT)
Dept: FAMILY MEDICINE CLINIC | Facility: CLINIC | Age: 58
End: 2023-06-15
Payer: MEDICAID

## 2023-06-15 VITALS
HEART RATE: 81 BPM | TEMPERATURE: 98 F | BODY MASS INDEX: 24.29 KG/M2 | OXYGEN SATURATION: 98 % | WEIGHT: 132 LBS | HEIGHT: 62 IN | DIASTOLIC BLOOD PRESSURE: 70 MMHG | SYSTOLIC BLOOD PRESSURE: 100 MMHG

## 2023-06-15 DIAGNOSIS — N28.9 RENAL INSUFFICIENCY: ICD-10-CM

## 2023-06-15 DIAGNOSIS — R39.9 URINARY SYMPTOM OR SIGN: ICD-10-CM

## 2023-06-15 DIAGNOSIS — F33.1 MAJOR DEPRESSIVE DISORDER, RECURRENT EPISODE, MODERATE WITH ANXIOUS DISTRESS (HCC): ICD-10-CM

## 2023-06-15 DIAGNOSIS — Z78.0 POSTMENOPAUSAL: ICD-10-CM

## 2023-06-15 DIAGNOSIS — Z84.1 FAMILY HISTORY OF CHRONIC KIDNEY DISEASE: ICD-10-CM

## 2023-06-15 DIAGNOSIS — E03.9 ACQUIRED HYPOTHYROIDISM: ICD-10-CM

## 2023-06-15 DIAGNOSIS — Z90.710 HISTORY OF HYSTERECTOMY: ICD-10-CM

## 2023-06-15 DIAGNOSIS — R63.4 UNINTENTIONAL WEIGHT LOSS: Primary | ICD-10-CM

## 2023-06-15 LAB
BILIRUBIN: NEGATIVE
GLUCOSE (URINE DIPSTICK): NEGATIVE MG/DL
KETONES (URINE DIPSTICK): NEGATIVE MG/DL
LEUKOCYTES: NEGATIVE
MULTISTIX EXPIRATION DATE: NORMAL DATE
MULTISTIX LOT#: NORMAL NUMERIC
NITRITE, URINE: NEGATIVE
OCCULT BLOOD: NEGATIVE
PH, URINE: 6 (ref 4.5–8)
PROTEIN (URINE DIPSTICK): NEGATIVE MG/DL
SPECIFIC GRAVITY: 1.01 (ref 1–1.03)
URINE-COLOR: CLEAR
UROBILINOGEN,SEMI-QN: 0.2 MG/DL (ref 0–1.9)

## 2023-06-15 PROCEDURE — 99215 OFFICE O/P EST HI 40 MIN: CPT | Performed by: FAMILY MEDICINE

## 2023-06-15 PROCEDURE — 3008F BODY MASS INDEX DOCD: CPT | Performed by: FAMILY MEDICINE

## 2023-06-15 PROCEDURE — 3074F SYST BP LT 130 MM HG: CPT | Performed by: FAMILY MEDICINE

## 2023-06-15 PROCEDURE — 3078F DIAST BP <80 MM HG: CPT | Performed by: FAMILY MEDICINE

## 2023-06-15 PROCEDURE — 81003 URINALYSIS AUTO W/O SCOPE: CPT | Performed by: FAMILY MEDICINE

## 2023-06-15 RX ORDER — BUPROPION HYDROCHLORIDE 150 MG/1
150 TABLET ORAL DAILY
Qty: 30 TABLET | Refills: 2 | Status: SHIPPED | OUTPATIENT
Start: 2023-06-15 | End: 2023-06-15

## 2023-06-15 RX ORDER — LEVOTHYROXINE SODIUM 0.07 MG/1
75 TABLET ORAL EVERY MORNING
Qty: 30 TABLET | Refills: 2 | Status: SHIPPED | OUTPATIENT
Start: 2023-06-15

## 2023-06-15 RX ORDER — ESTRADIOL 10 UG/1
10 INSERT VAGINAL
Qty: 24 TABLET | Refills: 3 | Status: SHIPPED | OUTPATIENT
Start: 2023-06-15 | End: 2023-07-15

## 2023-06-16 ENCOUNTER — HOSPITAL ENCOUNTER (OUTPATIENT)
Dept: CT IMAGING | Age: 58
Discharge: HOME | End: 2023-06-16
Attending: OPHTHALMOLOGY
Payer: MEDICAID

## 2023-06-16 ENCOUNTER — LAB ENCOUNTER (OUTPATIENT)
Dept: LAB | Age: 58
End: 2023-06-16
Attending: OPHTHALMOLOGY
Payer: MEDICAID

## 2023-06-16 DIAGNOSIS — R63.4 UNINTENTIONAL WEIGHT LOSS: ICD-10-CM

## 2023-06-16 DIAGNOSIS — E05.00 BASEDOW'S DISEASE: ICD-10-CM

## 2023-06-16 DIAGNOSIS — E05.90 HYPERTHYROIDISM: Primary | ICD-10-CM

## 2023-06-16 DIAGNOSIS — E05.90 HYPERTHYROIDISM: ICD-10-CM

## 2023-06-16 LAB
BASOPHILS # BLD AUTO: 0.04 X10(3) UL (ref 0–0.2)
BASOPHILS NFR BLD AUTO: 0.6 %
EOSINOPHIL # BLD AUTO: 0.08 X10(3) UL (ref 0–0.7)
EOSINOPHIL NFR BLD AUTO: 1.2 %
ERYTHROCYTE [DISTWIDTH] IN BLOOD BY AUTOMATED COUNT: 14.2 %
HCT VFR BLD AUTO: 39.3 %
HGB BLD-MCNC: 12.6 G/DL
IMM GRANULOCYTES # BLD AUTO: 0.02 X10(3) UL (ref 0–1)
IMM GRANULOCYTES NFR BLD: 0.3 %
LYMPHOCYTES # BLD AUTO: 0.87 X10(3) UL (ref 1–4)
LYMPHOCYTES NFR BLD AUTO: 13.3 %
MCH RBC QN AUTO: 30.5 PG (ref 26–34)
MCHC RBC AUTO-ENTMCNC: 32.1 G/DL (ref 31–37)
MCV RBC AUTO: 95.2 FL
MONOCYTES # BLD AUTO: 0.41 X10(3) UL (ref 0.1–1)
MONOCYTES NFR BLD AUTO: 6.3 %
NEUTROPHILS # BLD AUTO: 5.13 X10 (3) UL (ref 1.5–7.7)
NEUTROPHILS # BLD AUTO: 5.13 X10(3) UL (ref 1.5–7.7)
NEUTROPHILS NFR BLD AUTO: 78.3 %
PLATELET # BLD AUTO: 254 10(3)UL (ref 150–450)
RBC # BLD AUTO: 4.13 X10(6)UL
WBC # BLD AUTO: 6.6 X10(3) UL (ref 4–11)

## 2023-06-16 PROCEDURE — 84445 ASSAY OF TSI GLOBULIN: CPT

## 2023-06-16 PROCEDURE — 76377 3D RENDER W/INTRP POSTPROCES: CPT | Performed by: OPHTHALMOLOGY

## 2023-06-16 PROCEDURE — 36415 COLL VENOUS BLD VENIPUNCTURE: CPT

## 2023-06-16 PROCEDURE — 85025 COMPLETE CBC W/AUTO DIFF WBC: CPT

## 2023-06-16 PROCEDURE — 70480 CT ORBIT/EAR/FOSSA W/O DYE: CPT | Performed by: OPHTHALMOLOGY

## 2023-06-19 LAB — THY STIM IMMUNO: <0.1 IU/L

## 2023-06-20 DIAGNOSIS — F33.1 MAJOR DEPRESSIVE DISORDER, RECURRENT EPISODE, MODERATE WITH ANXIOUS DISTRESS (HCC): ICD-10-CM

## 2023-06-20 DIAGNOSIS — F43.9 STRESS AT HOME: ICD-10-CM

## 2023-06-21 DIAGNOSIS — F43.9 STRESS AT HOME: ICD-10-CM

## 2023-06-21 DIAGNOSIS — F33.1 MAJOR DEPRESSIVE DISORDER, RECURRENT EPISODE, MODERATE WITH ANXIOUS DISTRESS (HCC): ICD-10-CM

## 2023-06-21 RX ORDER — BUPROPION HYDROCHLORIDE 300 MG/1
300 TABLET ORAL DAILY
Qty: 30 TABLET | Refills: 0 | Status: SHIPPED | OUTPATIENT
Start: 2023-06-21

## 2023-06-21 NOTE — TELEPHONE ENCOUNTER
Patient would like to have a refill on medication below,states she has 3 tablets left.      BUPROPION  MG Oral Tablet 24 Hr [Pharmacy Med Name: BUPROPION XL 300MG TABLETS]

## 2023-06-21 NOTE — TELEPHONE ENCOUNTER
LOV 6/15/23     3. Major depressive disorder, recurrent episode, moderate with anxious distress (HCC)  Continue Wellbutrin  mg p.o. every morning for now, consider decreasing the dose in the near future. Patient requesting refill please sign if you approve.

## 2023-06-22 ENCOUNTER — TELEPHONE (OUTPATIENT)
Dept: FAMILY MEDICINE CLINIC | Facility: CLINIC | Age: 58
End: 2023-06-22

## 2023-06-22 DIAGNOSIS — D72.810 LYMPHOCYTOPENIA: Primary | ICD-10-CM

## 2023-06-22 RX ORDER — BUPROPION HYDROCHLORIDE 300 MG/1
TABLET ORAL
Qty: 90 TABLET | Refills: 0 | OUTPATIENT
Start: 2023-06-22

## 2023-06-22 RX ORDER — TRAZODONE HYDROCHLORIDE 150 MG/1
150 TABLET ORAL NIGHTLY PRN
Qty: 30 TABLET | Refills: 0 | Status: SHIPPED | OUTPATIENT
Start: 2023-06-22

## 2023-06-27 ENCOUNTER — TELEPHONE (OUTPATIENT)
Dept: HEMATOLOGY/ONCOLOGY | Facility: HOSPITAL | Age: 58
End: 2023-06-27

## 2023-07-03 ENCOUNTER — MED REC SCAN ONLY (OUTPATIENT)
Dept: FAMILY MEDICINE CLINIC | Facility: CLINIC | Age: 58
End: 2023-07-03

## 2023-07-13 ENCOUNTER — PATIENT MESSAGE (OUTPATIENT)
Dept: FAMILY MEDICINE CLINIC | Facility: CLINIC | Age: 58
End: 2023-07-13

## 2023-07-13 ENCOUNTER — OFFICE VISIT (OUTPATIENT)
Dept: SURGERY | Facility: CLINIC | Age: 58
End: 2023-07-13

## 2023-07-13 DIAGNOSIS — N95.2 VAGINAL ATROPHY: ICD-10-CM

## 2023-07-13 DIAGNOSIS — Z01.89 ENCOUNTER FOR URINE TEST: ICD-10-CM

## 2023-07-13 DIAGNOSIS — N39.0 RECURRENT UTI: Primary | ICD-10-CM

## 2023-07-13 LAB
APPEARANCE: CLEAR
BILIRUBIN: NEGATIVE
GLUCOSE (URINE DIPSTICK): NEGATIVE MG/DL
KETONES (URINE DIPSTICK): NEGATIVE MG/DL
LEUKOCYTES: NEGATIVE
MULTISTIX LOT#: NORMAL NUMERIC
NITRITE, URINE: NEGATIVE
OCCULT BLOOD: NEGATIVE
PH, URINE: 5.5 (ref 4.5–8)
PROTEIN (URINE DIPSTICK): NEGATIVE MG/DL
SPECIFIC GRAVITY: <=1.005 (ref 1–1.03)
URINE-COLOR: YELLOW
UROBILINOGEN,SEMI-QN: 0.2 MG/DL (ref 0–1.9)

## 2023-07-13 PROCEDURE — 81003 URINALYSIS AUTO W/O SCOPE: CPT

## 2023-07-13 PROCEDURE — 99203 OFFICE O/P NEW LOW 30 MIN: CPT

## 2023-07-13 RX ORDER — ESTRADIOL 0.1 MG/G
CREAM VAGINAL
Qty: 42.5 G | Refills: 5 | Status: SHIPPED | OUTPATIENT
Start: 2023-07-13 | End: 2023-07-14

## 2023-07-13 RX ORDER — CONJUGATED ESTROGENS 0.62 MG/G
CREAM VAGINAL
COMMUNITY
Start: 2023-07-11 | End: 2023-07-13

## 2023-07-13 NOTE — PATIENT INSTRUCTIONS
D-mannose, 1g daily  Cranberry extract, take as directed on bottle  Estrace cream sent to pharmacy; apply to urethra only; daily for 2 weeks, then daily 3x/week   Standing culture orders placed; go to any lab next time you have a \"flare\"   Can take pyridium in the meantime while waiting for cultures

## 2023-07-13 NOTE — PROGRESS NOTES
[facility] [address], [address] [facility]    Urology Consult Note    History of Present Illness:   Patient is a(n) 59year old female with hypothyroidism, HTN, anxiety, depressoin, and fhx of RCC who presents to establish care with urology. Pt notes recent recurrent UTIs. She had recurrent UTIs in her 25s and was put on daily macrobid as prophylactic for a yr, which resolved her sx. She has felt good until recently when she started getting recurrent UTIs again, which seems to be correlated with increased sexual activity. Sx include urgency, frequency, and pelvic pressure. Also notes dyspareunia occasionally and has been prescribed vaginal tablets but has not taken. 22 culture +e. Coli and klebsiella aerogenes, took macrobid 100mg bid x 7 days and sx did not fully resolve. Repeat culture 23 +klebsiella, took cipro 500mg bid x 7 days and sx completely resolved. Sx again 23 with neg culture but pt tx with bactrim ds bid x 5 days and sx persisted. Since recent UTI flares, she has felt increased frequency and will have to double void at times. Second time ranges from dribble to moderate volume. Currently, feels well but frequency still present. UA neg. PVR: 0mL. UTI hx: frequently in 25s eventually treated with daily macrobid ppx, frequent again recently  Gross hematuria: none  Tobacco hx: none  Kidney stone hx: none  Fam h/o  malignancy: RCC in father in 76s tx with RNFA, mother and father both have CKD, pt to see nephroligst soon regarding persistently borderline elevated Cr around 1.05.     Drinks 16 oz of water and some tea, not much hydration throughout the day    HISTORY:  Past Medical History:   Diagnosis Date    Allergic rhinitis     Anxiety     Asthma Child mendoza    Depression     Esophageal reflux     Hyperlipidemia     Hyperthyroidism       Past Surgical History:   Procedure Laterality Date    COLONOSCOPY      HYSTERECTOMY            OTHER SURGICAL HISTORY Eye surgery    TUBAL LIGATION        Family History   Problem Relation Age of Onset    Diabetes Father     Stroke Father       Social History:   Social History     Socioeconomic History    Marital status:    Tobacco Use    Smoking status: Never     Passive exposure: Never    Smokeless tobacco: Never   Vaping Use    Vaping Use: Never used   Substance and Sexual Activity    Alcohol use: Yes     Alcohol/week: 3.0 - 4.0 standard drinks of alcohol     Types: 3 - 4 Standard drinks or equivalent per week     Comment: occ    Drug use: Never   Other Topics Concern    Caffeine Concern No    Exercise No    Seat Belt Yes    Special Diet No    Stress Concern Yes    Weight Concern No        Allergies    Epinephrine             OTHER (SEE COMMENTS)  Cats Claw, Uncaria *    RASH  Dander                  RASH    Comment:Dogs  Dust Mites              RASH  Grass                   RASH  Mold                    RASH    Review of Systems:   A 10-point review of systems was completed and is negative other than as noted above. Physical Exam:   There were no vitals taken for this visit. GENERAL APPEARANCE: well developed, well nourished, in no acute distress  NEUROLOGIC: no localizing neurologic signs, alert and oriented x 3, converses appropriately  HEAD: atraumatic, normocephalic  EYES: sclera non-icteric  ORAL CAVITY: mucosa moist  NECK/THYROID: no obvious masses or goiter  LUNGS: non-labored breathing  ABDOMEN: soft, nontender, nondistended  EXTREMITIES: warm, well-perfused. No clubbing, cyanosis or edema.   SKIN: no obvious rashes    Results:     Laboratory Data:  Lab Results   Component Value Date    WBC 6.6 06/16/2023    HGB 12.6 06/16/2023    .0 06/16/2023     Lab Results   Component Value Date     06/08/2023    K 3.9 06/08/2023     06/08/2023    CO2 27.0 06/08/2023    BUN 6 (L) 06/08/2023    GLU 81 06/08/2023    AST 17 02/10/2023    ALT 33 02/10/2023    TP 6.9 02/10/2023    ALB 3.7 02/10/2023    CA 9.5 06/08/2023       Urinalysis Results (last three years):  Recent Labs     12/22/22  1138 01/08/23  1040 05/19/23  1731 06/15/23  1154   SPECGRAVITY 1.020* 1.020 1.005 1.010   PHURINE 7.0 5.5 6.0 6.0   NITRITE Negative Negative Negative Negative       Urine Culture Results (last three years):  Lab Results   Component Value Date    URINECUL  05/19/2023     <10,000 cfu/ml Multiple species present- probable contamination. URINECUL (A) 01/08/2023     >100,000 CFU/ML Klebsiella (Enterobacter) aerogenes    URINECUL 50,000-99,000 CFU/ML Escherichia coli (A) 12/22/2022    URINECUL (A) 12/22/2022     50,000-99,000 CFU/ML Klebsiella (Enterobacter) aerogenes       Imaging  CT ORBITS (CPT=70480)    Result Date: 6/16/2023  PROCEDURE:  CT ORBITS (CPT=70480)  LOCATION:  [facility]   COMPARISON:  None. INDICATIONS:  E05.90 Hyperthyroidism  TECHNIQUE:  Multi-planar CT images were performed through the orbits without intravenous contrast.  3D shaded surface renderings and MPR's are generated on an independent CT scanner workstation. Dose reduction techniques were used. Dose information is transmitted to the [facility]) NRDR ([facility] [address]) which includes the Dose Index Registry. 3-D RENDERING:  Three dimensional image processing was completed using a separate workstation under concurrent supervision. Images were archived. PATIENT STATED HISTORY:(As transcribed by Technologist)  Patient complain of (50#) weight loss in (8)months, also left eye inflammation. Joon Wolfe FINDINGS:  GLOBES:  No asymmetry or no visible mass. EXTRAOCULAR MUSCLES:  No enlargement or asymmetry. INTRACONAL SPACE:  No visible mass, with normal retrobulbar fat. EXTRACONAL SPACE:  Radiopaque tubing extends from the inferior medial left orbit adjacent to the lateral margin of the globe into the left nasal cavity, with the medial margin abutting the nasal septum.   This is above the left nasolacrimal duct which appears patent and partially filled with gas. There is a tiny amount along the lateral margin of the left nasal bones. SINUSES:  No significant mucosal thickening or fluid. BONES:  Intact bony orbit, without evidence of fracture. OTHER:  Deviation of nasal septum to right. Degenerative changes of both temporomandibular joints. CONCLUSION:  1. Radiopaque tubing extends from the medial inferior margin of the left globe into the left nasal cavity, abutting both the globe and nasal septum. This is above and separate from the nasolacrimal duct and measures approximately 17 mm in length. Correlate with ophthalmologic history. 2. Septal deviation to right. 3. Arthritic changes of both temporomandibular joints. Dictated by (CST): Danyel Burroughs MD on 6/16/2023 at 4:09 PM     Finalized by (CST): Danyel Burroughs MD on 6/16/2023 at 4:25 PM           Impression:   Recommendations:  Recurrent UTI  - bladder inflammation 2/2 initial infxn  - estrace cream daily for 2wks then daily only 3x/wk  - d-mannose and cranberry   - standing culture order placed to see if true infxn or bladder inflammation; pt to call if she has sx and can take azo in the meantime  - discussed dietery modifications such as cranberry/extract pills/supplements, drinking at least 40-60 oz water per day or enough to keep urine clear and to avoid dietary irritants such as coffee, tea, carbonated drinks, soda, juice, spicy, and citrus  - discussed behavioral modifications such as double voiding, bending over after void to completely empty, and post coital hygiene  - if more frequent flares despite above therapy, may consider short term trospium    Thank you very much for this consult.  Please call if there are any questions or concerns     Sera Gifford PA-C  Urology  [address]  Phone: [phone]    Date: 7/13/2023  Time: 1:03 PM

## 2023-07-14 NOTE — TELEPHONE ENCOUNTER
From: Eunice Harkins  To: Joni Booker DO  Sent: 7/13/2023 4:20 PM CDT  Subject: Rrfill    Can you please take the estradiol cream off my medication list. They keep refilling the cream instead of the vaginal insert tabs.  Thank you

## 2023-07-17 ENCOUNTER — PATIENT MESSAGE (OUTPATIENT)
Dept: FAMILY MEDICINE CLINIC | Facility: CLINIC | Age: 58
End: 2023-07-17

## 2023-07-17 NOTE — TELEPHONE ENCOUNTER
From: Bharti Ellis  To: Lisa Keith DO  Sent: 7/17/2023 9:07 AM CDT  Subject: Pre authorization     Dr. Tayo Pinon from the [facility] is sending you a pre authorization so that I can get the eye drops I need. Please fill out as soon as you can.  Thank you

## 2023-07-19 ENCOUNTER — OFFICE VISIT (OUTPATIENT)
Dept: HEMATOLOGY/ONCOLOGY | Age: 58
End: 2023-07-19
Attending: INTERNAL MEDICINE
Payer: MEDICAID

## 2023-07-19 VITALS
TEMPERATURE: 97 F | RESPIRATION RATE: 18 BRPM | DIASTOLIC BLOOD PRESSURE: 77 MMHG | BODY MASS INDEX: 23.94 KG/M2 | SYSTOLIC BLOOD PRESSURE: 124 MMHG | HEART RATE: 79 BPM | WEIGHT: 126.81 LBS | HEIGHT: 61 IN | OXYGEN SATURATION: 98 %

## 2023-07-19 DIAGNOSIS — D72.810 LYMPHOCYTOPENIA: ICD-10-CM

## 2023-07-19 DIAGNOSIS — R63.4 WEIGHT LOSS, ABNORMAL: Primary | ICD-10-CM

## 2023-07-19 LAB
ALBUMIN SERPL-MCNC: 3.7 G/DL (ref 3.4–5)
ALBUMIN/GLOB SERPL: 1.1 {RATIO} (ref 1–2)
ALP LIVER SERPL-CCNC: 72 U/L
ALT SERPL-CCNC: 19 U/L
ANION GAP SERPL CALC-SCNC: 0 MMOL/L (ref 0–18)
AST SERPL-CCNC: 11 U/L (ref 15–37)
BASOPHILS # BLD AUTO: 0.06 X10(3) UL (ref 0–0.2)
BASOPHILS NFR BLD AUTO: 1.2 %
BILIRUB SERPL-MCNC: 0.4 MG/DL (ref 0.1–2)
BUN BLD-MCNC: 11 MG/DL (ref 7–18)
CALCIUM BLD-MCNC: 9.3 MG/DL (ref 8.5–10.1)
CHLORIDE SERPL-SCNC: 106 MMOL/L (ref 98–112)
CO2 SERPL-SCNC: 32 MMOL/L (ref 21–32)
CREAT BLD-MCNC: 1.18 MG/DL
EOSINOPHIL # BLD AUTO: 0.14 X10(3) UL (ref 0–0.7)
EOSINOPHIL NFR BLD AUTO: 2.9 %
ERYTHROCYTE [DISTWIDTH] IN BLOOD BY AUTOMATED COUNT: 13.5 %
FASTING STATUS PATIENT QL REPORTED: NO
FOLATE SERPL-MCNC: 14.5 NG/ML (ref 8.7–?)
GFR SERPLBLD BASED ON 1.73 SQ M-ARVRAT: 53 ML/MIN/1.73M2 (ref 60–?)
GLOBULIN PLAS-MCNC: 3.4 G/DL (ref 2.8–4.4)
GLUCOSE BLD-MCNC: 101 MG/DL (ref 70–99)
HAV IGM SER QL: NONREACTIVE
HBV CORE IGM SER QL: NONREACTIVE
HBV SURFACE AG SERPL QL IA: NONREACTIVE
HCT VFR BLD AUTO: 38.8 %
HCV AB SERPL QL IA: NONREACTIVE
HGB BLD-MCNC: 12.9 G/DL
IMM GRANULOCYTES # BLD AUTO: 0.01 X10(3) UL (ref 0–1)
IMM GRANULOCYTES NFR BLD: 0.2 %
LYMPHOCYTES # BLD AUTO: 0.99 X10(3) UL (ref 1–4)
LYMPHOCYTES NFR BLD AUTO: 20.4 %
MCH RBC QN AUTO: 30.7 PG (ref 26–34)
MCHC RBC AUTO-ENTMCNC: 33.2 G/DL (ref 31–37)
MCV RBC AUTO: 92.4 FL
MONOCYTES # BLD AUTO: 0.24 X10(3) UL (ref 0.1–1)
MONOCYTES NFR BLD AUTO: 4.9 %
NEUTROPHILS # BLD AUTO: 3.42 X10 (3) UL (ref 1.5–7.7)
NEUTROPHILS # BLD AUTO: 3.42 X10(3) UL (ref 1.5–7.7)
NEUTROPHILS NFR BLD AUTO: 70.4 %
OSMOLALITY SERPL CALC.SUM OF ELEC: 286 MOSM/KG (ref 275–295)
PLATELET # BLD AUTO: 223 10(3)UL (ref 150–450)
POTASSIUM SERPL-SCNC: 3.8 MMOL/L (ref 3.5–5.1)
PROT SERPL-MCNC: 7.1 G/DL (ref 6.4–8.2)
RBC # BLD AUTO: 4.2 X10(6)UL
SODIUM SERPL-SCNC: 138 MMOL/L (ref 136–145)
VIT B12 SERPL-MCNC: 439 PG/ML (ref 193–986)
WBC # BLD AUTO: 4.9 X10(3) UL (ref 4–11)

## 2023-07-19 PROCEDURE — 99205 OFFICE O/P NEW HI 60 MIN: CPT | Performed by: INTERNAL MEDICINE

## 2023-07-19 RX ORDER — ESTRADIOL 0.1 MG/G
CREAM VAGINAL DAILY
COMMUNITY

## 2023-07-19 NOTE — PROGRESS NOTES
New consult referred by Dr. Johnny Duckworth for abnormal labs. Pt states she moved from [place] to [place] in December to take care of her parents. She has lost 50 lbs in this time. Her thyroid levels have been checked. She has IBS. She states she has had a poor appetite and gets full quickly. She states her appetite in the last few weeks has improved. January her weight was 159.2 and today 123 at home.       Outpatient Oncology Care Plan  Problem list:  knowledge deficit    Problems related to:    disease/disease progression    Interventions:  provided general teaching    Expected outcomes:  understands plan of care    Progress towards outcome:  making progress    Education Record    Learner:  Patient  Barriers / Limitations:  None  Method:  Brief focused  Outcome:  Shows understanding  Comments:

## 2023-07-20 ENCOUNTER — TELEPHONE (OUTPATIENT)
Dept: HEMATOLOGY/ONCOLOGY | Facility: HOSPITAL | Age: 58
End: 2023-07-20

## 2023-07-20 NOTE — TELEPHONE ENCOUNTER
Hansa Malone would like for  to know that her doctor in [place]   Name : Dr. Gisel Fuentes. ph: Old Number [phone]  : New Number [phone]   [address] [place] [address].  [address] [place] [address]  Thanks [employer] [facility]

## 2023-07-21 ENCOUNTER — TELEPHONE (OUTPATIENT)
Dept: HEMATOLOGY/ONCOLOGY | Age: 58
End: 2023-07-21

## 2023-07-21 LAB
EBV NA IGG SER QL IA: POSITIVE
EBV VCA IGG SER QL IA: POSITIVE
EBV VCA IGM SER QL IA: NEGATIVE

## 2023-07-21 NOTE — TELEPHONE ENCOUNTER
Patient is calling to inform Dr. Clau Diaz that she will sign a release of information on 7/27/23 when she comes for her CT SCAN to obtain her records. Called 7/21/23.

## 2023-07-26 DIAGNOSIS — R63.0 POOR APPETITE: ICD-10-CM

## 2023-07-26 DIAGNOSIS — R63.4 UNEXPLAINED WEIGHT LOSS: Primary | ICD-10-CM

## 2023-07-26 DIAGNOSIS — R68.81 EARLY SATIETY: ICD-10-CM

## 2023-07-26 DIAGNOSIS — D72.810 LYMPHOPENIA: ICD-10-CM

## 2023-07-26 DIAGNOSIS — R63.4 UNINTENTIONAL WEIGHT LOSS: ICD-10-CM

## 2023-07-26 NOTE — TELEPHONE ENCOUNTER
Requested Prescriptions     Pending Prescriptions Disp Refills    ALPRAZolam 0.5 MG Oral Tab  0     Sig: Take 1 tablet (0.5 mg total) by mouth as needed. Last office visit 6/15/23     Office visit note 2/1/23:             5. Major depressive disorder, recurrent episode, moderate with anxious distress ([facility] [address]  6. Stress at home  Patient would like to try daily dosing of Wellbutrin instead of twice a day, prescription sent for bupropion  mg p.o. every morning in place of bupropion  mg twice a day. Patient has been taking trazodone 100 mg tab, 1-1/2 tabs nightly, recommend trial of switching to the 150 mg tab. Last refill - never by CY    No Future office visit            Ordered by external provider. Never ordered by Dr. Jewels Roche. Last refill: unknown.

## 2023-07-27 ENCOUNTER — TELEPHONE (OUTPATIENT)
Dept: HEMATOLOGY/ONCOLOGY | Facility: HOSPITAL | Age: 58
End: 2023-07-27

## 2023-07-27 ENCOUNTER — HOSPITAL ENCOUNTER (OUTPATIENT)
Dept: CT IMAGING | Age: 58
End: 2023-07-27
Attending: INTERNAL MEDICINE
Payer: MEDICAID

## 2023-07-27 ENCOUNTER — HOSPITAL ENCOUNTER (OUTPATIENT)
Dept: ULTRASOUND IMAGING | Age: 58
Discharge: HOME | End: 2023-07-27
Attending: NURSE PRACTITIONER
Payer: MEDICAID

## 2023-07-27 ENCOUNTER — HOSPITAL ENCOUNTER (OUTPATIENT)
Dept: GENERAL RADIOLOGY | Age: 58
Discharge: HOME | End: 2023-07-27
Attending: NURSE PRACTITIONER
Payer: MEDICAID

## 2023-07-27 ENCOUNTER — LAB ENCOUNTER (OUTPATIENT)
Dept: LAB | Age: 58
End: 2023-07-27
Attending: NURSE PRACTITIONER
Payer: MEDICAID

## 2023-07-27 DIAGNOSIS — R68.81 EARLY SATIETY: ICD-10-CM

## 2023-07-27 DIAGNOSIS — R63.4 UNEXPLAINED WEIGHT LOSS: ICD-10-CM

## 2023-07-27 DIAGNOSIS — R63.0 POOR APPETITE: ICD-10-CM

## 2023-07-27 DIAGNOSIS — R63.4 UNINTENTIONAL WEIGHT LOSS: ICD-10-CM

## 2023-07-27 DIAGNOSIS — D72.810 LYMPHOPENIA: ICD-10-CM

## 2023-07-27 LAB
CANCER AG125 SERPL-ACNC: 10.6 U/ML (ref ?–35)
CANCER AG19-9 SERPL-ACNC: 2.7 U/ML (ref ?–37)
CEA SERPL-MCNC: 0.3 NG/ML (ref ?–5)
LDH SERPL L TO P-CCNC: 168 U/L

## 2023-07-27 PROCEDURE — 83615 LACTATE (LD) (LDH) ENZYME: CPT

## 2023-07-27 PROCEDURE — 36415 COLL VENOUS BLD VENIPUNCTURE: CPT

## 2023-07-27 PROCEDURE — 71046 X-RAY EXAM CHEST 2 VIEWS: CPT | Performed by: NURSE PRACTITIONER

## 2023-07-27 PROCEDURE — 82378 CARCINOEMBRYONIC ANTIGEN: CPT

## 2023-07-27 PROCEDURE — 76700 US EXAM ABDOM COMPLETE: CPT | Performed by: NURSE PRACTITIONER

## 2023-07-27 PROCEDURE — 86301 IMMUNOASSAY TUMOR CA 19-9: CPT

## 2023-07-27 PROCEDURE — 86304 IMMUNOASSAY TUMOR CA 125: CPT

## 2023-07-27 NOTE — TELEPHONE ENCOUNTER
Called patient with results of her CXR and US. Will need additional imaging with CT chest, abdominal US only significant for fatty liver. Suspicion remains high for underlying malignancy. Await tumor marker results and will most likely proceed with CT C/A/P. Delfina Skinner is aware that she will receive a call back with these results. No further questions at conclusion of call.

## 2023-07-31 ENCOUNTER — OFFICE VISIT (OUTPATIENT)
Dept: NEPHROLOGY | Facility: CLINIC | Age: 58
End: 2023-07-31
Payer: MEDICAID

## 2023-07-31 ENCOUNTER — TELEPHONE (OUTPATIENT)
Dept: HEMATOLOGY/ONCOLOGY | Facility: HOSPITAL | Age: 58
End: 2023-07-31

## 2023-07-31 VITALS — SYSTOLIC BLOOD PRESSURE: 128 MMHG | DIASTOLIC BLOOD PRESSURE: 60 MMHG | WEIGHT: 128 LBS | BODY MASS INDEX: 24 KG/M2

## 2023-07-31 DIAGNOSIS — N18.2 CKD (CHRONIC KIDNEY DISEASE) STAGE 2, GFR 60-89 ML/MIN: Primary | ICD-10-CM

## 2023-08-02 ENCOUNTER — TELEPHONE (OUTPATIENT)
Dept: HEMATOLOGY/ONCOLOGY | Age: 58
End: 2023-08-02

## 2023-08-03 ENCOUNTER — TELEPHONE (OUTPATIENT)
Dept: HEMATOLOGY/ONCOLOGY | Facility: HOSPITAL | Age: 58
End: 2023-08-03

## 2023-08-03 PROBLEM — K76.0 FATTY LIVER: Status: ACTIVE | Noted: 2023-08-03

## 2023-08-03 RX ORDER — ALPRAZOLAM 0.5 MG/1
0.5 TABLET ORAL AS NEEDED
Refills: 0 | OUTPATIENT
Start: 2023-08-03

## 2023-08-03 RX ORDER — ALPRAZOLAM 0.5 MG/1
0.5 TABLET ORAL AS NEEDED
Qty: 20 TABLET | Refills: 0 | Status: SHIPPED | OUTPATIENT
Start: 2023-08-03

## 2023-08-10 ENCOUNTER — HOSPITAL ENCOUNTER (OUTPATIENT)
Dept: CT IMAGING | Age: 58
Discharge: HOME | End: 2023-08-10
Attending: INTERNAL MEDICINE
Payer: MEDICAID

## 2023-08-10 DIAGNOSIS — R63.4 WEIGHT LOSS, ABNORMAL: ICD-10-CM

## 2023-08-10 DIAGNOSIS — D72.810 LYMPHOCYTOPENIA: ICD-10-CM

## 2023-08-10 PROCEDURE — 71260 CT THORAX DX C+: CPT | Performed by: INTERNAL MEDICINE

## 2023-08-10 PROCEDURE — 74177 CT ABD & PELVIS W/CONTRAST: CPT | Performed by: INTERNAL MEDICINE

## 2023-08-15 DIAGNOSIS — F43.9 STRESS AT HOME: ICD-10-CM

## 2023-08-15 DIAGNOSIS — F33.1 MAJOR DEPRESSIVE DISORDER, RECURRENT EPISODE, MODERATE WITH ANXIOUS DISTRESS (HCC): ICD-10-CM

## 2023-08-16 ENCOUNTER — OFFICE VISIT (OUTPATIENT)
Dept: HEMATOLOGY/ONCOLOGY | Age: 58
End: 2023-08-16
Attending: INTERNAL MEDICINE
Payer: MEDICAID

## 2023-08-16 DIAGNOSIS — R63.4 WEIGHT LOSS, ABNORMAL: Primary | ICD-10-CM

## 2023-08-16 DIAGNOSIS — D72.810 LYMPHOCYTOPENIA: ICD-10-CM

## 2023-08-16 PROCEDURE — 99214 OFFICE O/P EST MOD 30 MIN: CPT | Performed by: INTERNAL MEDICINE

## 2023-08-16 RX ORDER — FAMOTIDINE 20 MG/1
20 TABLET, FILM COATED ORAL 3 TIMES DAILY
COMMUNITY

## 2023-08-16 RX ORDER — BUPROPION HYDROCHLORIDE 300 MG/1
300 TABLET ORAL DAILY
Qty: 30 TABLET | Refills: 0 | Status: SHIPPED | OUTPATIENT
Start: 2023-08-16 | End: 2023-08-17

## 2023-08-16 RX ORDER — TRAZODONE HYDROCHLORIDE 150 MG/1
150 TABLET ORAL NIGHTLY PRN
Qty: 30 TABLET | Refills: 0 | OUTPATIENT
Start: 2023-08-16

## 2023-08-16 RX ORDER — LORATADINE 10 MG/1
10 TABLET ORAL DAILY PRN
COMMUNITY

## 2023-08-16 NOTE — TELEPHONE ENCOUNTER
Requesting refill on trazodone. LOV 6/15/2023. Patient scheduled to see new PCP on 9/7/2023. Please approve or deny pending rx. Thank you. PSR: Please assist with updating PCP.

## 2023-08-16 NOTE — PROGRESS NOTES
Cancer Center Progress Note  Patient Name: Crystal Hawkins   YOB: 1965   Medical Record Number: [id number]   [facility]: [id number]   Attending Physician: Sita Pacheco M.D. Date of Visit: 2023     Chief Complaint:  Patient presents with: Follow - Up: Follow up with Alexandro Cadena        Oncologic History:  Crystal Hawkins is a 59year old female female referred for evaluation of lymphocytopenia. The patient reports that she moved here from [place] a few months ago. When she arrived, she began losing weight. She states that she had been hypothyroid in the past, but became hyperthyroid. Her meds have been adjusted. She lost 30 lbs in a few months. She has not had F/C/NS. No palpable LAD. She was seen by her primary care provider, who noted lymphocytopenia. She was referred for evaluation. History of Present Illness:  Pt is here for follow up. No change in her symptoms. CT did not reveal evidence of malignancy.     Performance Status:  ECOG 0    Past Medical History:  Past Medical History:   Diagnosis Date    Allergic rhinitis     Anxiety     Asthma Child mendoza    Depression     Esophageal reflux     Fatty liver 2023 ultrasound of abdomen    Hyperlipidemia     Hyperthyroidism        Past Surgical History:  Past Surgical History:   Procedure Laterality Date    COLONOSCOPY      HYSTERECTOMY            OTHER SURGICAL HISTORY      Eye surgery    TUBAL LIGATION         Family History:  Family History   Problem Relation Age of Onset    Diabetes Father     Stroke Father        Social History:  Social History    Socioeconomic History      Marital status:       Spouse name: Not on file      Number of children: Not on file      Years of education: Not on file      Highest education level: Not on file    Occupational History      Not on file    Tobacco Use      Smoking status: Never      Smokeless tobacco: Never    Vaping Use      Vaping Use: Never used    Substance and Sexual Activity      Alcohol use: Yes        Alcohol/week: 3.0 - 4.0 standard drinks of alcohol        Types: 3 - 4 Standard drinks or equivalent per week        Comment: occ      Drug use: Never      Sexual activity: Not on file    Other Topics      Concerns:        Caffeine Concern: No        Exercise: No        Seat Belt: Yes        Special Diet: No        Stress Concern: Yes        Weight Concern: No    Social History Narrative      Not on file    Social Determinants of Health  Financial Resource Strain: Not on file  Food Insecurity: Not on file  Transportation Needs: Not on file  Physical Activity: Not on file  Stress: Not on file  Social Connections: Not on file  Housing Stability: Not on file    Current Medications:    Current Outpatient Medications:     buPROPion  MG Oral Tablet 24 Hr, Take 1 tablet (300 mg total) by mouth daily. , Disp: 30 tablet, Rfl: 0    famotidine 20 MG Oral Tab, Take 1 tablet (20 mg total) by mouth in the morning, at noon, and at bedtime. 3-4 time daily, Disp: , Rfl:     CRANBERRY OR, Take 15 mg by mouth daily. , Disp: , Rfl:     loratadine 10 MG Oral Tab, Take 1 tablet (10 mg total) by mouth daily as needed for Allergies. , Disp: , Rfl:     levothyroxine 75 MCG Oral Tab, Take 1 tablet (75 mcg total) by mouth before breakfast., Disp: 90 tablet, Rfl: 1    ALPRAZolam 0.5 MG Oral Tab, Take 1 tablet (0.5 mg total) by mouth as needed for Anxiety. , Disp: 20 tablet, Rfl: 0    Lifitegrast (Auguste Sumeet OP), Apply to eye 2 (two) times a day., Disp: , Rfl:     Loteprednol Etabonate (EYSUVIS OP), Apply to eye 4 (four) times daily. , Disp: , Rfl:     estradiol 0.1 MG/GM Vaginal Cream, Place vaginally daily. , Disp: , Rfl:     traZODone 150 MG Oral Tab, Take 1 tablet (150 mg total) by mouth nightly as needed for Sleep., Disp: 30 tablet, Rfl: 0    albuterol 108 (90 Base) MCG/ACT Inhalation Aero Soln, Inhale 2 puffs into the lungs every 4 (four) hours as needed. , Disp: , Rfl:     losartan 50 MG Oral Tab, Take 1 tablet (50 mg total) by mouth 2 (two) times daily. , Disp: 180 tablet, Rfl: 0    MONTELUKAST 10 MG Oral Tab, TAKE 1 TABLET(10 MG) BY MOUTH EVERY NIGHT, Disp: 90 tablet, Rfl: 0    fluticasone-salmeterol 250-50 MCG/ACT Inhalation Aerosol Powder, Breath Activated, Inhale 1 puff into the lungs every 12 (twelve) hours. , Disp: 3 each, Rfl: 1    fluticasone propionate 50 MCG/ACT Nasal Suspension, 2 sprays by Nasal route daily. , Disp: , Rfl:     valACYclovir 1 G Oral Tab, Take 1 tablet (1,000 mg total) by mouth as needed. 2x 12, Disp: , Rfl:     Ascorbic Acid (VITAMIN C) 100 MG Oral Tab, Take 1 tablet (100 mg total) by mouth daily. , Disp: , Rfl:     zinc sulfate 220 (50 Zn) MG Oral Cap, Take 25 mg by mouth daily. , Disp: , Rfl:     Multiple Vitamins-Minerals (MULTI-VITAMIN/MINERALS) Oral Tab, Take 1 tablet by mouth daily. , Disp: , Rfl:     Estradiol 10 MCG Vaginal Tab, Place 10 mcg vaginally twice a week., Disp: 24 tablet, Rfl: 3    Allergies:    Epinephrine             OTHER (SEE COMMENTS)  Cats Claw, Uncaria *    RASH  Dander                  RASH    Comment:Dogs  Dust Mites              RASH  Grass                   RASH  Mold                    RASH     Review of Systems:    Constitutional No fevers, chills, night sweats, excessive fatigue or weight loss. Eyes No significant visual difficulties. No diplopia. No yellowing of the eyes. Hematologic/Lymphatic No easy bruising or bleeding. No any tender or palpable lymph nodes. Respiratory No dyspnea, Pleuritic chest pain, cough or hemoptysis. Cardiovascular No anginal chest pain, palpitations or orthopnea. Gastrointestinal No nausea, vomiting, diarrhea, GI bleeding, or constipation. Genitorurinary  No hematuria, dysuria, or incontinence. No abnormal bleeding. Integumentary No rashes or yellowing of the skin   Neurologic No headache, blurred vision, and no areas of focal weakness. Normal gait. Psychiatric No insomnia, depression, kary or mood swings. Vital Signs: There were no vitals taken for this visit. Physical Examination:      Laboratory:   Latest Reference Range & Units 07/19/23 11:02 07/27/23 11:20   Sodium 136 - 145 mmol/L 138    Potassium 3.5 - 5.1 mmol/L 3.8    Chloride 98 - 112 mmol/L 106    Carbon Dioxide, Total 21.0 - 32.0 mmol/L 32.0    BUN 7 - 18 mg/dL 11    CREATININE 0.55 - 1.02 mg/dL 1.18 (H)    CALCIUM 8.5 - 10.1 mg/dL 9.3    eGFR-Cr >=60 mL/min/1.73m2 53 (L)    ANION GAP 0 - 18 mmol/L 0    CALCULATED OSMOLALITY 275 - 295 mOsm/kg 286    ALKALINE PHOSPHATASE 46 - 118 U/L 72    AST (SGOT) 15 - 37 U/L 11 (L)    ALT (SGPT) 13 - 56 U/L 19    Total Bilirubin 0.1 - 2.0 mg/dL 0.4    Globulin 2.8 - 4.4 g/dL 3.4    LDH 84 - 246 U/L  168   Vitamin B12 193 - 986 pg/mL 002    FOLATE (FOLIC ACID), SERUM >=9.6 ng/mL 14.5    CEA <=5.0 ng/mL  0.3   A/G Ratio 1.0 - 2.0  1.1    PROTEIN, TOTAL 6.4 - 8.2 g/dL 7.1    Albumin 3.4 - 5.0 g/dL 3.7    CANCER  () <=35.0 U/mL  10.6   CARBOHYDRATE AG 19-9 <=37.0 U/mL  2.7   Patient Fasting for CMP? No    (H): Data is abnormally high  (L): Data is abnormally low   Latest Reference Range & Units 07/19/23 11:02   HEPATITIS A AB, IGM Nonreactive   Nonreactive   HBc IgM AB Nonreactive   Nonreactive   HBSAg Screen Nonreactive   Nonreactive   HCV AB Nonreactive   Nonreactive   HIV Antigen Antibody Combo Non-Reactive  Non-Reactive   Sherman-Barr Virus AB IgG Negative  Positive ! SHERMAN-RAYMOND VIRUS AB IGM Negative  Negative   SHERMAN-RAYMOND NUCLEAR AG ABS Negative  Positive !    WBC 4.0 - 11.0 x10(3) uL 4.9   Hemoglobin 12.0 - 16.0 g/dL 12.9   Hematocrit 35.0 - 48.0 % 38.8   Platelet Count 028.6 - 450.0 10(3)uL 223.0   RBC 3.80 - 5.30 x10(6)uL 4.20   MCH 26.0 - 34.0 pg 30.7   MCHC 31.0 - 37.0 g/dL 33.2   MCV 80.0 - 100.0 fL 92.4   RDW % 13.5   Prelim Neutrophil Abs 1.50 - 7.70 x10 (3) uL 3.42   Neutrophils Absolute 1.50 - 7.70 x10(3) uL 3.42   Lymphocytes Absolute 1.00 - 4.00 x10(3) uL 0.99 (L)   Monocytes Absolute 0.10 - 1.00 x10(3) uL 0.24   Eosinophils Absolute 0.00 - 0.70 x10(3) uL 0.14   Basophils Absolute 0.00 - 0.20 x10(3) uL 0.06   Immature Granulocyte Absolute 0.00 - 1.00 x10(3) uL 0.01   Neutrophils % % 70.4   Lymphocytes % % 20.4   Monocytes % % 4.9   Eosinophils % % 2.9   Basophils % % 1.2   Immature Granulocyte % % 0.2   !: Data is abnormal  (L): Data is abnormally low    Radiology:  CT C/A/P 8/10/23: CONCLUSION:    1. A few scattered nonenlarged lymph nodes in the mediastinum are more likely reactive. 2. Four low density foci in the liver. These are most likely benign and may reflect cysts. 3. No additional diagnostic abnormality. 4. Details as above. Continued clinical correlation recommended. CXR 7/27/23: CONCLUSION:  Small right middle lobe opacity may represent atelectasis or early consolidative process. Recommend CT of the chest for further evaluation as clinically indicated. US Abd 7/27/23: CONCLUSION:  Mild diffuse fatty infiltration of the liver. Otherwise unremarkable abdominal ultrasound. Pathology:    Impression and Plan:  Wt loss: Unclear etiology. Fortunately, there was no evidence of malignancy on scan. At this point, I recommend a GI evaluation for possible EGD, C-Scope and malabsorption workup. Lymphocytopenia: Benign. Viral workup was negative. Planned Follow Up:  As needed    I spent * minutes face to face with the patient. More than 50% of that time was spent counseling the patient and/or on coordination of care. The diagnosis, prognosis, and general treatment was explained to the patient and the family. Electronically Signed by: KATHERYN Figueroa [facility]

## 2023-08-16 NOTE — TELEPHONE ENCOUNTER
Requesting refill on bupropion. LOV 6/15/2023. Patient scheduled to see new PCP on 9/7/2023. Please approve or deny pending rx. Thank you. PSR: Please assist with updating PCP.

## 2023-08-16 NOTE — PROGRESS NOTES
Patient is here today for follow up with Mary Lawler for hematology follow up. Patient denies pain. Medication list and medical history were reviewed and updated. Education Record    Learner:  Patient      Disease / Diagnosis: Lymphocytopenia    Barriers / Limitations:  None   Comments:    Method:  Brief focused, Discussion, Printed material and Reinforcement   Comments:    General Topics:  Medication, Pain, Procedure and Plan of care reviewed   Comments:    Outcome:  Shows understanding   Comments:      AVS provided and follow up reviewed. Patient instructed to call as needed.

## 2023-08-17 ENCOUNTER — OFFICE VISIT (OUTPATIENT)
Dept: FAMILY MEDICINE CLINIC | Facility: CLINIC | Age: 58
End: 2023-08-17
Payer: MEDICAID

## 2023-08-17 VITALS
SYSTOLIC BLOOD PRESSURE: 112 MMHG | TEMPERATURE: 97 F | BODY MASS INDEX: 23.6 KG/M2 | HEART RATE: 84 BPM | DIASTOLIC BLOOD PRESSURE: 68 MMHG | HEIGHT: 61 IN | WEIGHT: 125 LBS

## 2023-08-17 DIAGNOSIS — F43.9 STRESS AT HOME: ICD-10-CM

## 2023-08-17 DIAGNOSIS — E03.9 ACQUIRED HYPOTHYROIDISM: ICD-10-CM

## 2023-08-17 DIAGNOSIS — I10 ESSENTIAL HYPERTENSION: ICD-10-CM

## 2023-08-17 DIAGNOSIS — Z51.81 THERAPEUTIC DRUG MONITORING: Primary | ICD-10-CM

## 2023-08-17 DIAGNOSIS — J45.40 MODERATE PERSISTENT ASTHMA WITHOUT COMPLICATION: ICD-10-CM

## 2023-08-17 DIAGNOSIS — Z12.11 ENCOUNTER FOR SCREENING FOR MALIGNANT NEOPLASM OF COLON: ICD-10-CM

## 2023-08-17 DIAGNOSIS — F33.1 MAJOR DEPRESSIVE DISORDER, RECURRENT EPISODE, MODERATE WITH ANXIOUS DISTRESS (HCC): ICD-10-CM

## 2023-08-17 DIAGNOSIS — R12 HEARTBURN: ICD-10-CM

## 2023-08-17 DIAGNOSIS — J30.1 ALLERGIC RHINITIS DUE TO POLLEN, UNSPECIFIED SEASONALITY: ICD-10-CM

## 2023-08-17 PROCEDURE — 99214 OFFICE O/P EST MOD 30 MIN: CPT | Performed by: FAMILY MEDICINE

## 2023-08-17 PROCEDURE — 3008F BODY MASS INDEX DOCD: CPT | Performed by: FAMILY MEDICINE

## 2023-08-17 PROCEDURE — 3074F SYST BP LT 130 MM HG: CPT | Performed by: FAMILY MEDICINE

## 2023-08-17 PROCEDURE — 3078F DIAST BP <80 MM HG: CPT | Performed by: FAMILY MEDICINE

## 2023-08-17 RX ORDER — MONTELUKAST SODIUM 10 MG/1
10 TABLET ORAL NIGHTLY
Qty: 90 TABLET | Refills: 1 | Status: SHIPPED | OUTPATIENT
Start: 2023-08-17

## 2023-08-17 RX ORDER — LEVOTHYROXINE SODIUM 0.07 MG/1
75 TABLET ORAL
Qty: 90 TABLET | Refills: 1 | Status: SHIPPED | OUTPATIENT
Start: 2023-08-17

## 2023-08-17 RX ORDER — TRAZODONE HYDROCHLORIDE 150 MG/1
150 TABLET ORAL NIGHTLY PRN
Qty: 90 TABLET | Refills: 1 | Status: SHIPPED | OUTPATIENT
Start: 2023-08-17

## 2023-08-17 RX ORDER — ALPRAZOLAM 0.5 MG/1
0.5 TABLET ORAL
Qty: 30 TABLET | Refills: 1 | Status: SHIPPED | OUTPATIENT
Start: 2023-08-17

## 2023-08-17 RX ORDER — BUPROPION HYDROCHLORIDE 300 MG/1
300 TABLET ORAL DAILY
Qty: 90 TABLET | Refills: 1 | Status: SHIPPED | OUTPATIENT
Start: 2023-08-17

## 2023-08-17 RX ORDER — LOSARTAN POTASSIUM 50 MG/1
50 TABLET ORAL 2 TIMES DAILY
Qty: 180 TABLET | Refills: 1 | Status: SHIPPED | OUTPATIENT
Start: 2023-08-17

## 2023-09-07 PROBLEM — R79.89 ELEVATED SERUM CREATININE: Status: RESOLVED | Noted: 2023-03-06 | Resolved: 2023-09-07

## 2023-09-07 PROBLEM — M54.50 PAIN IN LEFT LUMBAR REGION OF BACK: Status: RESOLVED | Noted: 2023-03-06 | Resolved: 2023-09-07

## 2023-09-07 PROBLEM — J30.81 ALLERGIC RHINITIS DUE TO ANIMAL HAIR AND DANDER: Status: RESOLVED | Noted: 2023-02-01 | Resolved: 2023-09-07

## 2023-09-07 PROBLEM — H69.92 DYSFUNCTION OF LEFT EUSTACHIAN TUBE: Status: RESOLVED | Noted: 2023-02-01 | Resolved: 2023-09-07

## 2023-09-07 PROBLEM — I10 ESSENTIAL HYPERTENSION: Status: RESOLVED | Noted: 2023-02-19 | Resolved: 2023-09-07

## 2023-09-09 DIAGNOSIS — Z78.0 POSTMENOPAUSAL: ICD-10-CM

## 2023-09-09 DIAGNOSIS — Z90.710 HISTORY OF HYSTERECTOMY: ICD-10-CM

## 2023-09-09 RX ORDER — ESTRADIOL 10 UG/1
10 INSERT VAGINAL
Qty: 24 TABLET | Refills: 3 | Status: SHIPPED | OUTPATIENT
Start: 2023-09-11 | End: 2023-10-11

## 2023-09-18 NOTE — H&P
[address] [facility] - Gastroenterology                                                                                                  Clinic History and Physical     Patient presents with:  Consult: Weight loss; heart burn      Requesting physician or provider: Junior Momo DO    HPI:   Joe Solomon is a 59year old year-old female with medical history including lymphocytopenia, GERD, fatty liver disease, hyperlipidemia, hyperthyroidism, depression, anxiety, who presents for colon cancer screening evaluation and heartburn. Moved from [place] to [place] in November to help care for parents, Reports increased stress related to leaving friends, trouble finding a job and stress of parents health. Pt currently talking to a therapist.    #heartburn  -patient taking famotidine 20mg (pepcid) and tums daily for heartburn  -was previously on prevacid but was told by nephrology to stop taking prevacid due to elevated creatinine  -will have esophageal burning and burning in epigastric abdomen, still has break through symptoms     #weight loss  -unintentional weight loss of 50lbs since November, reports weight is stable the last month  -pt does report eating less food and having low appetite, sometimes would have abdominal pain which made her not want to eat    #CRC screen  Patient is here today as a referral from her PCP for evaluation prior to undergoing colonoscopy for CRC screening. Patient denies any GI symptoms of nausea, vomiting, hematemesis, abdominal pain, change in bowel habits, constipation, diarrhea, hematochezia, or melena. Additionally there is no fevers and no reported chest pain or shortness of breath. -reports 1-3 bowel movements daily of soft, formed stool    Pt is due for CRC screening. We discussed the available screening options for CRC such as FIT and cologuard. They are electing to repeat another cologuard.     Last colonoscopy: around 2000 (done at outside facility, normal per patient), last cologuard around  was normal  Last EGD: around  (done at outside facility, small stomach polyp)    NSAIDS:none   Tobacco:none   Alcohol:social  Marijuana:none   Illicit drugs:none     FH GI malignancy: none     No history of adverse reaction to sedation  No JOSE RAUL  No anticoagulants  No pacemaker/defibrillator  No pain medications and/or sleep aides    Wt Readings from Last 6 Encounters:  23 : 125 lb (56.7 kg)  23 : 125 lb (56.7 kg)  23 : 125 lb (56.7 kg)  23 : 128 lb (58.1 kg)  23 : 126 lb 12.8 oz (57.5 kg)  06/15/23 : 132 lb (59.9 kg)         History, Medications, Allergies, ROS:      Past Medical History:   Diagnosis Date    Allergic rhinitis     Anxiety     Arthritis     Asthma Child mendoza    Depression     Esophageal reflux     Fatty liver 2023 ultrasound of abdomen    Hyperlipidemia     Hyperthyroidism       Past Surgical History:   Procedure Laterality Date    COLONOSCOPY      HYSTERECTOMY            OTHER SURGICAL HISTORY      Eye surgery    TUBAL LIGATION        Family Hx:   Family History   Problem Relation Age of Onset    Diabetes Father     Stroke Father     Depression Father     Obesity Sister       Social History:   Social History     Socioeconomic History    Marital status:    Tobacco Use    Smoking status: Never     Passive exposure: Never    Smokeless tobacco: Never   Vaping Use    Vaping Use: Never used   Substance and Sexual Activity    Alcohol use:  Yes     Alcohol/week: 3.0 - 4.0 standard drinks of alcohol     Types: 3 - 4 Standard drinks or equivalent per week     Comment: occ    Drug use: Never   Other Topics Concern    Caffeine Concern No    Exercise No    Seat Belt Yes    Special Diet No    Stress Concern Yes    Weight Concern No     Service No    Blood Transfusions No    Occupational Exposure No    Hobby Hazards No    Sleep Concern No    Back Care No    Bike Helmet No    Self-Exams No        Medications (Active prior to today's visit):  Current Outpatient Medications   Medication Sig Dispense Refill    escitalopram (LEXAPRO) 20 MG Oral Tab Take 1 tablet (20 mg total) by mouth daily. fluticasone propionate 50 MCG/ACT Nasal Suspension 1 spray in each nostril Nasally Once a day as needed      Estradiol 10 MCG Vaginal Tab Place 10 mcg vaginally twice a week. 24 tablet 3    levothyroxine 75 MCG Oral Tab Take 1 tablet (75 mcg total) by mouth before breakfast. 90 tablet 1    buPROPion  MG Oral Tablet 24 Hr Take 1 tablet (300 mg total) by mouth daily. 90 tablet 1    ALPRAZolam 0.5 MG Oral Tab Take 1 tablet (0.5 mg total) by mouth daily as needed for Anxiety. 30 tablet 1    losartan 50 MG Oral Tab Take 1 tablet (50 mg total) by mouth 2 (two) times daily. 180 tablet 1    montelukast 10 MG Oral Tab Take 1 tablet (10 mg total) by mouth nightly. 90 tablet 1    traZODone 150 MG Oral Tab Take 1 tablet (150 mg total) by mouth nightly as needed for Sleep. 90 tablet 1    famotidine 20 MG Oral Tab Take 1 tablet (20 mg total) by mouth in the morning, at noon, and at bedtime. 3-4 time daily      CRANBERRY OR Take 15 mg by mouth daily. loratadine 10 MG Oral Tab Take 1 tablet (10 mg total) by mouth daily as needed for Allergies. Lifitegrast (Dufm Kilts OP) Apply to eye 2 (two) times a day. Loteprednol Etabonate (EYSUVIS OP) Apply to eye 4 (four) times daily. estradiol 0.1 MG/GM Vaginal Cream Place vaginally daily. albuterol 108 (90 Base) MCG/ACT Inhalation Aero Soln Inhale 2 puffs into the lungs every 4 (four) hours as needed. fluticasone-salmeterol 250-50 MCG/ACT Inhalation Aerosol Powder, Breath Activated Inhale 1 puff into the lungs every 12 (twelve) hours. 3 each 1    valACYclovir 1 G Oral Tab Take 1 tablet (1,000 mg total) by mouth as needed. 2x 12      Ascorbic Acid (VITAMIN C) 100 MG Oral Tab Take 1 tablet (100 mg total) by mouth daily. zinc sulfate 220 (50 Zn) MG Oral Cap Take 25 mg by mouth daily. Multiple Vitamins-Minerals (MULTI-VITAMIN/MINERALS) Oral Tab Take 1 tablet by mouth daily. Allergies:    Epinephrine             OTHER (SEE COMMENTS)  Cats Claw, Uncaria *    RASH  Dander                  RASH    Comment:Dogs  Dust Mites              RASH  Grass                   RASH  Mold                    RASH    ROS:   CONSTITUTIONAL: negative for fevers, chills, sweats and weight loss  EYES Negative for scleral icterus or redness, and diplopia   HEENT: Negative for dysphagia and hoarseness  RESPIRATORY: Negative for cough and severe shortness of breath  CARDIOVASCULAR: Negative for crushing sub-sternal chest pain  GASTROINTESTINAL: See HPI  GENITOURINARY: Negative for dysuria  MUSCULOSKELETAL: Negative for arthralgias and myalgias  SKIN: Negative for jaundice, rash or pruritus  NEUROLOGICAL: Negative for dizziness and headaches  BEHAVIOR/PSYCH: Negative for psychotic behavior and poor appetite      PHYSICAL EXAM:   Blood pressure 147/80, pulse 75, height 5' 1\" (1.549 m), weight 125 lb (56.7 kg). GEN: Alert, no acute distress, well-nourished   HEENT: anicteric sclera, neck supple, trachea midline, MMM, no palpable or tender neck or supraclavicular lymph nodes  CV: RRR, the extremities are warm and well perfused   LUNGS: No increased work of breathing, CTAB  ABDOMEN: Soft, symmetrical, non-tender without distention or guarding. No scars or lesions. Aorta is without bruit or visible pulsation. Umbilicus is midline without herniation. Normoactive bowel sounds are present, No masses, hepatomegaly or splenomegaly noted. MSK: No erythema, no warmth, no swelling of joints  SKIN: No jaundice, no erythema, no rashes, no lesions  HEMATOLOGIC: No bleeding, no bruising  NEURO: Alert and interactive, LIM  PSYCH: appropriate mood & affect    Labs/Imaging:     Patient's labs and imaging were reviewed and discussed with patient today.     .  ASSESSMENT/PLAN:   Jason Dobbins is a 59year old year-old female with medical history including lymphocytopenia, GERD, fatty liver disease, hyperlipidemia, hyperthyroidism, depression, anxiety, who presents for colon cancer screening evaluation, unintentional weight loss and heartburn. #heartburn  -patient taking famotidine 20mg (pepcid) and tums daily for heartburn  -was previously on prevacid but was told by nephrology to stop taking prevacid due to elevated creatinine  -will have esophageal burning and burning in epigastric abdomen, still has break through symptoms   -Differentials include but not limited to: GERD, dietary pattern induced, infectious esophagitis, EOE, reflux hypersensitivity or functional heartburn, GI malignancy (+unintentional weight loss, +anorexia)    #weight loss  -unintentional weight loss of 50lbs since November, reports weight is stable the last month  -pt does report eating less food and having low appetite, sometimes would have abdominal pain which made her not want to eat  -Moved from [place] to [place] in November to help care for parents, Reports increased stress related to leaving friends, trouble finding a job and stress of parents health. Pt currently talking to a therapist.  -Per Dr. Valentin Lewis note 9/7/23: \"Pt has Hx of hypothyroidism. Has been on levothyroxine for years but reports it has been decreased to 75 mcg a few months ago. Reports since the medication dosage change, she has not lost more weight. A Couple months ago she was feeling very irritated, anxious, trouble sleeping, losing weight. Since reducing from 88 mcg to 75 mcg she has noted improvement in her symptoms. \" - Rachana Quarles agrees with the above today in clinic  -differentials include but not limited to malignancy, psychiatric (depression, stress induced), transient hyperthyroidism, peptic ulcer disease, celiac disease, IBD  -pt seen by hem/onc on 8/16/23, seen by nephrology on 7/31/23, and last seen by PCP on 9/7/23  -hem/onc impression/plan:  Wt loss: Unclear etiology. Fortunately, there was no evidence of malignancy on scan. At this point, I recommend a GI evaluation for possible EGD, C-Scope and malabsorption workup. Lymphocytopenia: Benign. Viral workup was negative. (Lymphocytopenia resolved on 7/19/23 labs)  -nephrology impression/plan:  PLAN- d/w pt at length. No further w/u indicated; yield of serologies, monoclonal protein study, renal biopsy is extremely low in this setting. Will transition from PPI to H2 blocker and follow labs. -CT abdomen, chest & pelvis on 8/10/23  CONCLUSION:    1. A few scattered nonenlarged lymph nodes in the mediastinum are more likely reactive. 2. Four low density foci in the liver. These are most likely benign and may reflect cysts. 3. No additional diagnostic abnormality.   -Labs 7/19/23:  -CMP unremarkable  -CBC notable for mild elevated creatinine  -negative for HIV, hepatitis, EBV IGM  -negative Cancer Ag 125    # Average Risk screening: patient is considered average risk for colon cancer (No family hx of colon cancer) and it is appropriate to proceed with screening colonoscopy. Patient is currently asymptomatic and denies diarrhea, hematochezia, thin-stools or weight loss. We discussed risks/benefits/alternatives to procedure, including colonoscopy, CT colonography and stool testing, they want to proceed with cologuard stool testing.  -No anemia noted on lab work July 2023.  -patient does not want to pursue colonoscopy due to feeling very ill with bowel prep.  Pt agreeable to pursue colonoscopy if cologuard is positive    Recommend:  -complete cologuard test  -go to lab to have blood drawn (celiac test) & do H. Pylori breath test  -follow up appt with me in 3 months (December) for heartburn & weight loss    -Schedule EGD w/ Dr. Chad Rich, Dr. Rachael Manzano or Dr. Erik Serna   -Dx: chronic heart burn & unintentional weight loss  -Sedation: MAC   -Anti-platelets and anti-coagulants: N/A   -Diabetes meds: N/A     ENDOSCOPIC RISK BENEFIT DISCUSSION: I described the procedure in great detail with the patient. I discussed the risks and benefits, including but not limited to: bleeding, perforation, infection, anesthesia complications, and even death. Patient will be NPO after midnight and will have a person physically present at time of pick-up to drive patient home. Patient verbalized understanding and agrees to proceed with procedure as planned. Orders This Visit:  Orders Placed This Encounter      Tissue Transglutaminase Ab, IgA      Immunoglobulin A, Quant      Helicobacter Pylori Breath Test, Adult      Meds This Visit:  Requested Prescriptions      No prescriptions requested or ordered in this encounter       Imaging & Referrals:  [address] (EXTERNAL)       Ward Mcgarry, [address] [facility]  9/19/2023      The dictation was partially prepared using [employer] voice recognition software. As a result, errors may occur. When identified, these errors have been corrected.  While every attempt is made to correct errors during dictation, discrepancies may still exist.

## 2023-09-19 ENCOUNTER — TELEPHONE (OUTPATIENT)
Facility: CLINIC | Age: 58
End: 2023-09-19

## 2023-09-19 ENCOUNTER — LAB ENCOUNTER (OUTPATIENT)
Dept: LAB | Facility: HOSPITAL | Age: 58
End: 2023-09-19
Payer: MEDICAID

## 2023-09-19 ENCOUNTER — OFFICE VISIT (OUTPATIENT)
Facility: CLINIC | Age: 58
End: 2023-09-19

## 2023-09-19 VITALS
DIASTOLIC BLOOD PRESSURE: 80 MMHG | BODY MASS INDEX: 23.6 KG/M2 | WEIGHT: 125 LBS | HEART RATE: 75 BPM | HEIGHT: 61 IN | SYSTOLIC BLOOD PRESSURE: 147 MMHG

## 2023-09-19 DIAGNOSIS — R63.4 UNINTENTIONAL WEIGHT LOSS: ICD-10-CM

## 2023-09-19 DIAGNOSIS — Z12.11 COLON CANCER SCREENING: ICD-10-CM

## 2023-09-19 DIAGNOSIS — R63.4 UNINTENTIONAL WEIGHT LOSS: Primary | ICD-10-CM

## 2023-09-19 DIAGNOSIS — R12 HEARTBURN: ICD-10-CM

## 2023-09-19 DIAGNOSIS — R12 HEARTBURN: Primary | ICD-10-CM

## 2023-09-19 PROCEDURE — 3079F DIAST BP 80-89 MM HG: CPT

## 2023-09-19 PROCEDURE — 83013 H PYLORI (C-13) BREATH: CPT

## 2023-09-19 PROCEDURE — 3008F BODY MASS INDEX DOCD: CPT

## 2023-09-19 PROCEDURE — 82784 ASSAY IGA/IGD/IGG/IGM EACH: CPT

## 2023-09-19 PROCEDURE — 99244 OFF/OP CNSLTJ NEW/EST MOD 40: CPT

## 2023-09-19 PROCEDURE — 36415 COLL VENOUS BLD VENIPUNCTURE: CPT

## 2023-09-19 PROCEDURE — 3077F SYST BP >= 140 MM HG: CPT

## 2023-09-19 PROCEDURE — 86364 TISS TRNSGLTMNASE EA IG CLAS: CPT

## 2023-09-19 RX ORDER — ESCITALOPRAM OXALATE 20 MG/1
1 TABLET ORAL DAILY
COMMUNITY

## 2023-09-19 RX ORDER — FLUTICASONE PROPIONATE 50 MCG
SPRAY, SUSPENSION (ML) NASAL
COMMUNITY

## 2023-09-19 NOTE — PATIENT INSTRUCTIONS
-complete cologuard test  -go to lab to have blood drawn & do H. Pylori breath test  -follow up appt with me in 3 months (December) for heartburn & weight loss    1. Schedule upper endoscopy (EGD) with Dr. Tawnya Delcid or Dr. Deana Kumar: chronic heartburn & unintentional weight loss]    2. If you start any NEW medication after your visit today, please notify us. Certain medications will need to be held before the procedure, or the procedure cannot be performed safely. 3. DO NOT TAKE: Iron (ferrous sulfate/ ferrous gluconate) pills, herbal supplements, multivitamins, or diet medications (i.e. Phentermine/Vyvanse) for 7 days before exam.  DO NOT TAKE: Any form of alcohol, recreational drugs and any forms of Erectile Dysfunction medications 24 hours prior to procedure. 4. The day BEFORE your procedure, NOTHING TO EAT OR DRINK AFTER MIDNIGHT! If your procedure is scheduled in the afternoon, you may have clear liquids only (see below) up to 3 hours before the time of your procedure. If you fail to keep your stomach empty for 3 hours prior to procedure time, your procedure may be CANCELLED.         Antacids  -Tums, Rolaids, Vera-seltzer heartburn (calcium carbonate)  -pepto-bismol (Bismuth subsalicylate, aluminium magnesium)  -mylanta, maalox, gaviscon, gelusil (Aluminum Hydroxide, Magnesium Hydroxide)  -milk of magnesia (magnesium hydroxide)    H2 receptor antagonists  -famotidine (Pepcid) 20-40mg daily (or 20mg twice daily)

## 2023-09-19 NOTE — TELEPHONE ENCOUNTER
Scheduled for:  EGD 69953  Provider Name:  Dr Brandee Brown  Date:  10/30/2023  Location:  [facility]  Sedation: MAC    Time:  0730 (pt is aware to arrive at 0630)  Prep:  NPO after midnight  Meds/Allergies Reconciled?:  Physician reviewed  Diagnosis with codes:  Heartburn R12  Was patient informed to call insurance with codes (Y/N):       Referral sent?:  Referral was sent at the time of electronic surgical scheduling. [facility] or [address]  notified?:  I sent an electronic request to Endo Scheduling and received a confirmation today. Medication Orders:  Pt is aware to NOT take iron pills, herbal meds and diet supplements for 7 days before exam. Also to NOT take any form of alcohol, recreational drugs and any forms of ED meds 24 hours before exam.     Misc Orders:       Further instructions given by staff:  I discussed the prep intructions with the patient in office which SHE verbally understood. Copy of instructions was handed to patient as well. Patient was also advised about cancellation policy.

## 2023-09-20 LAB
H PYLORI BREATH TEST: NEGATIVE
IGA SERPL-MCNC: 344 MG/DL (ref 70–312)
TTG IGA SER-ACNC: 0.8 U/ML (ref ?–7)

## 2023-09-27 ENCOUNTER — TELEPHONE (OUTPATIENT)
Facility: CLINIC | Age: 58
End: 2023-09-27

## 2023-10-17 ENCOUNTER — OFFICE VISIT (OUTPATIENT)
Dept: SURGERY | Facility: CLINIC | Age: 58
End: 2023-10-17
Payer: MEDICAID

## 2023-10-17 DIAGNOSIS — N39.0 RECURRENT UTI: Primary | ICD-10-CM

## 2023-10-17 DIAGNOSIS — R82.90 URINE FINDING: ICD-10-CM

## 2023-10-17 LAB
APPEARANCE: CLEAR
BILIRUBIN: NEGATIVE
GLUCOSE (URINE DIPSTICK): NEGATIVE MG/DL
KETONES (URINE DIPSTICK): NEGATIVE MG/DL
LEUKOCYTES: NEGATIVE
MULTISTIX LOT#: NORMAL NUMERIC
NITRITE, URINE: NEGATIVE
OCCULT BLOOD: NEGATIVE
PH, URINE: 6 (ref 4.5–8)
PROTEIN (URINE DIPSTICK): NEGATIVE MG/DL
SPECIFIC GRAVITY: 1.01 (ref 1–1.03)
URINE-COLOR: YELLOW
UROBILINOGEN,SEMI-QN: 0.2 MG/DL (ref 0–1.9)

## 2023-10-17 PROCEDURE — 99213 OFFICE O/P EST LOW 20 MIN: CPT

## 2023-10-17 PROCEDURE — 81002 URINALYSIS NONAUTO W/O SCOPE: CPT

## 2023-10-17 NOTE — PROGRESS NOTES
HPI:   Merritt Hendrix is a(n) 59year old female patient with a hx of hypothyroidism, HTN, anxiety, depression, and fhx of RCC who presents for follow up. Pt last saw me on 23 for recurrent UTI that was correlated with intercourse and associated need to double void. I advised her to start cranberry, d-mannose, and estrogen, along with dietary and lifestyle modifications. Also taking a estrogen vaginal suppository. She is currently doing well on cranberry, estrogen, and increased hydration. Has not tried d-mannose. No more UTI sx. Still has need to double void approx 1x/day, but markedly improved from before. Also c/o very minor milky discharge, odorless. No more dyspareunia. BM daily. Recently more constipated but trying to improve hydration again. UA today neg. HISTORY:  Past Medical History:   Diagnosis Date    Allergic rhinitis     Anxiety     Arthritis     Asthma Child mendoza    Depression     Esophageal reflux     Fatty liver 2023 ultrasound of abdomen    Hyperlipidemia     Hyperthyroidism       Past Surgical History:   Procedure Laterality Date    COLONOSCOPY      HYSTERECTOMY            OTHER SURGICAL HISTORY      Eye surgery    TUBAL LIGATION        Family History   Problem Relation Age of Onset    Diabetes Father     Stroke Father     Depression Father     Obesity Sister       Social History:   Social History     Socioeconomic History    Marital status:    Tobacco Use    Smoking status: Never     Passive exposure: Never    Smokeless tobacco: Never   Vaping Use    Vaping Use: Never used   Substance and Sexual Activity    Alcohol use:  Yes     Alcohol/week: 3.0 - 4.0 standard drinks of alcohol     Types: 3 - 4 Standard drinks or equivalent per week     Comment: occ    Drug use: Never   Other Topics Concern    Caffeine Concern No    Exercise No    Seat Belt Yes    Special Diet No    Stress Concern Yes    Weight Concern No     Service No    Blood Transfusions No    Occupational Exposure No    Hobby Hazards No    Sleep Concern No    Back Care No    Bike Helmet No    Self-Exams No        Medications (Active prior to today's visit):  Current Outpatient Medications   Medication Sig Dispense Refill    escitalopram (LEXAPRO) 20 MG Oral Tab Take 1 tablet (20 mg total) by mouth daily. fluticasone propionate 50 MCG/ACT Nasal Suspension 1 spray in each nostril Nasally Once a day as needed      Estradiol 10 MCG Vaginal Tab Place 10 mcg vaginally twice a week. 24 tablet 3    levothyroxine 75 MCG Oral Tab Take 1 tablet (75 mcg total) by mouth before breakfast. 90 tablet 1    buPROPion  MG Oral Tablet 24 Hr Take 1 tablet (300 mg total) by mouth daily. 90 tablet 1    ALPRAZolam 0.5 MG Oral Tab Take 1 tablet (0.5 mg total) by mouth daily as needed for Anxiety. 30 tablet 1    losartan 50 MG Oral Tab Take 1 tablet (50 mg total) by mouth 2 (two) times daily. 180 tablet 1    montelukast 10 MG Oral Tab Take 1 tablet (10 mg total) by mouth nightly. 90 tablet 1    traZODone 150 MG Oral Tab Take 1 tablet (150 mg total) by mouth nightly as needed for Sleep. 90 tablet 1    famotidine 20 MG Oral Tab Take 1 tablet (20 mg total) by mouth in the morning, at noon, and at bedtime. 3-4 time daily      CRANBERRY OR Take 15 mg by mouth daily. loratadine 10 MG Oral Tab Take 1 tablet (10 mg total) by mouth daily as needed for Allergies. Lifitegrast (Germania Finely OP) Apply to eye 2 (two) times a day. Loteprednol Etabonate (EYSUVIS OP) Apply to eye 4 (four) times daily. estradiol 0.1 MG/GM Vaginal Cream Place vaginally daily. albuterol 108 (90 Base) MCG/ACT Inhalation Aero Soln Inhale 2 puffs into the lungs every 4 (four) hours as needed. fluticasone-salmeterol 250-50 MCG/ACT Inhalation Aerosol Powder, Breath Activated Inhale 1 puff into the lungs every 12 (twelve) hours. 3 each 1    valACYclovir 1 G Oral Tab Take 1 tablet (1,000 mg total) by mouth as needed.  2x 12      Ascorbic Acid (VITAMIN C) 100 MG Oral Tab Take 1 tablet (100 mg total) by mouth daily. zinc sulfate 220 (50 Zn) MG Oral Cap Take 25 mg by mouth daily. Multiple Vitamins-Minerals (MULTI-VITAMIN/MINERALS) Oral Tab Take 1 tablet by mouth daily. Allergies:    Epinephrine             OTHER (SEE COMMENTS)  Cats Claw, Uncaria *    RASH  Dander                  RASH    Comment:Dogs  Dust Mites              RASH  Grass                   RASH  Mold                    RASH    ROS:     A comprehensive 10 point review of systems was completed. Pertinent positives and negatives noted in the the HPI. PHYSICAL EXAM:     GENERAL APPEARANCE: well, developed, well nourished, in no acute distress  EARS: hearing intact  LUNGS: nonlabored breathing  ABDOMEN: soft, no obvious masses or tenderness, no CVA tenderness     ASSESSMENT/PLAN:   Recurrent UTI  - can trial off estrogen once finished with current tube  - if sx flare again, can go back on it  - continue dietery modifications such as d-mannose, cranberry/extract pills/supplements, drinking at least 40-60 oz water per day or enough to keep urine clear and to avoid dietary irritants such as coffee, tea, carbonated drinks, soda, juice, spicy, and citrus  - discussed behavioral modifications such as double voiding, bending over after void to completely empty, and post coital hygiene  - f/u in 1yr or prn    The above assessment and plan were discussed in detail with the patient who verbalized understanding and all questions were answered. A total of 20 minutes were spent on the visit including chart review in preparation for the visit, time with the patient, placing orders and communication with other providers where appropriate.     Jahaira Escalona PA-C  Urology  [place] [address]  Office: [phone]

## 2023-10-21 ENCOUNTER — HOSPITAL ENCOUNTER (OUTPATIENT)
Age: 58
Discharge: HOME | End: 2023-10-21
Attending: EMERGENCY MEDICINE
Payer: MEDICAID

## 2023-10-21 ENCOUNTER — APPOINTMENT (OUTPATIENT)
Dept: GENERAL RADIOLOGY | Age: 58
End: 2023-10-21
Attending: EMERGENCY MEDICINE
Payer: MEDICAID

## 2023-10-21 VITALS
RESPIRATION RATE: 18 BRPM | BODY MASS INDEX: 22.08 KG/M2 | WEIGHT: 120 LBS | HEIGHT: 62 IN | HEART RATE: 78 BPM | SYSTOLIC BLOOD PRESSURE: 123 MMHG | OXYGEN SATURATION: 98 % | TEMPERATURE: 98 F | DIASTOLIC BLOOD PRESSURE: 72 MMHG

## 2023-10-21 DIAGNOSIS — M25.461 EFFUSION OF RIGHT KNEE: Primary | ICD-10-CM

## 2023-10-21 PROCEDURE — 99213 OFFICE O/P EST LOW 20 MIN: CPT

## 2023-10-21 PROCEDURE — 99203 OFFICE O/P NEW LOW 30 MIN: CPT

## 2023-10-21 PROCEDURE — 73562 X-RAY EXAM OF KNEE 3: CPT | Performed by: EMERGENCY MEDICINE

## 2023-10-21 NOTE — ED QUICK NOTES
Pt discharged home. Pt states doctor states pt can get knee immobilizer on [employer] which she is going to do. Pt left ambulatory in stable condition. Pt has crutches at home.

## 2023-10-21 NOTE — DISCHARGE INSTRUCTIONS
Knee immobilizer and crutches with as little weightbearing as possible for the next few days then increasing as tolerated  Rest  Elevate your injured extremity  Apply cool compresses for 20 minutes at a time. motrin taken with food for pain  Follow up with your primary care doctor or orthopedist doctor.   Call Monday for an appointment to be seen later this coming week

## 2023-10-23 ENCOUNTER — OFFICE VISIT (OUTPATIENT)
Dept: ORTHOPEDICS CLINIC | Facility: CLINIC | Age: 58
End: 2023-10-23
Payer: MEDICAID

## 2023-10-23 ENCOUNTER — TELEPHONE (OUTPATIENT)
Dept: ORTHOPEDICS CLINIC | Facility: CLINIC | Age: 58
End: 2023-10-23

## 2023-10-23 ENCOUNTER — HOSPITAL ENCOUNTER (OUTPATIENT)
Dept: GENERAL RADIOLOGY | Age: 58
Discharge: HOME | End: 2023-10-23
Attending: ORTHOPAEDIC SURGERY

## 2023-10-23 VITALS — WEIGHT: 123.81 LBS | BODY MASS INDEX: 21.67 KG/M2 | HEIGHT: 63.5 IN

## 2023-10-23 DIAGNOSIS — M17.11 PRIMARY OSTEOARTHRITIS OF RIGHT KNEE: Primary | ICD-10-CM

## 2023-10-23 DIAGNOSIS — M25.561 RIGHT KNEE PAIN, UNSPECIFIED CHRONICITY: ICD-10-CM

## 2023-10-23 DIAGNOSIS — M25.561 RIGHT KNEE PAIN, UNSPECIFIED CHRONICITY: Primary | ICD-10-CM

## 2023-10-23 PROCEDURE — 73564 X-RAY EXAM KNEE 4 OR MORE: CPT | Performed by: ORTHOPAEDIC SURGERY

## 2023-10-23 RX ORDER — FLUTICASONE PROPIONATE AND SALMETEROL 50; 100 UG/1; UG/1
1 POWDER RESPIRATORY (INHALATION) 2 TIMES DAILY
COMMUNITY
Start: 2023-10-19

## 2023-10-23 RX ORDER — TRIAMCINOLONE ACETONIDE 40 MG/ML
40 INJECTION, SUSPENSION INTRA-ARTICULAR; INTRAMUSCULAR ONCE
Status: COMPLETED | OUTPATIENT
Start: 2023-10-23 | End: 2023-10-23

## 2023-10-23 RX ADMIN — TRIAMCINOLONE ACETONIDE 40 MG: 40 INJECTION, SUSPENSION INTRA-ARTICULAR; INTRAMUSCULAR at 16:33:00

## 2023-10-23 NOTE — PROCEDURES
Risks and benefits of knee aspiration discussed with the patient, with risks including but not limited to pain and swelling at the aspiration site and/or within the knee joint, infection. After informed consent, the patient's right knee was marked and prepped with antiseptic solution. Local anesthetic was used to create a skin wheal near the superolateral aspiration site. It was reprepped with antiseptic solution, and an 18-gauge needle was inserted through the superolateral portal site, into the knee joint deep to the patella. Aspiration was performed and returned 30 mL of clear yellow joint fluid. Fluid was discarded. The knee was then injected with a mixture of 1mL 40mg/mL Kenalog, 2mL 1% lidocaine and 2mL 0.5% marcaine through the superolateral portal.  A band-aid was applied. The patient tolerated the procedure well.     Jason Pickard MD, [address] [facility]  Phone [phone]  Fax [phone]

## 2023-10-23 NOTE — TELEPHONE ENCOUNTER
Confirmed with pt recent Xrays are NWB.  Xray ordered, please schedule with patient. Patient was informed to come in 20 min prior to appt to get Xray. Thank you!

## 2023-10-23 NOTE — TELEPHONE ENCOUNTER
Future Appointments   Date Time Provider Department Center   10/23/2023  4:00 PM Cameron Russo MD [facility]       This patient is coming for RT Knee slight tear on maniscus. There was recent imaging from her ER visit. Please advise if additional views are needed for this appt. Thanks.      Patient may be reached at [phone]

## 2023-10-30 ENCOUNTER — ANESTHESIA EVENT (OUTPATIENT)
Dept: ENDOSCOPY | Age: 58
End: 2023-10-30
Payer: MEDICAID

## 2023-10-30 ENCOUNTER — HOSPITAL ENCOUNTER (OUTPATIENT)
Age: 58
Setting detail: HOSPITAL OUTPATIENT SURGERY
Discharge: HOME | End: 2023-10-30
Attending: STUDENT IN AN ORGANIZED HEALTH CARE EDUCATION/TRAINING PROGRAM | Admitting: STUDENT IN AN ORGANIZED HEALTH CARE EDUCATION/TRAINING PROGRAM
Payer: MEDICAID

## 2023-10-30 ENCOUNTER — ANESTHESIA (OUTPATIENT)
Dept: ENDOSCOPY | Age: 58
End: 2023-10-30
Payer: MEDICAID

## 2023-10-30 VITALS
SYSTOLIC BLOOD PRESSURE: 132 MMHG | TEMPERATURE: 97 F | WEIGHT: 120 LBS | DIASTOLIC BLOOD PRESSURE: 72 MMHG | BODY MASS INDEX: 22.66 KG/M2 | OXYGEN SATURATION: 99 % | HEART RATE: 69 BPM | HEIGHT: 61 IN | RESPIRATION RATE: 14 BRPM

## 2023-10-30 DIAGNOSIS — R12 HEARTBURN: ICD-10-CM

## 2023-10-30 PROCEDURE — 88305 TISSUE EXAM BY PATHOLOGIST: CPT | Performed by: STUDENT IN AN ORGANIZED HEALTH CARE EDUCATION/TRAINING PROGRAM

## 2023-10-30 PROCEDURE — 88312 SPECIAL STAINS GROUP 1: CPT | Performed by: STUDENT IN AN ORGANIZED HEALTH CARE EDUCATION/TRAINING PROGRAM

## 2023-10-30 RX ORDER — SODIUM CHLORIDE, SODIUM LACTATE, POTASSIUM CHLORIDE, CALCIUM CHLORIDE 600; 310; 30; 20 MG/100ML; MG/100ML; MG/100ML; MG/100ML
INJECTION, SOLUTION INTRAVENOUS CONTINUOUS PRN
Status: DISCONTINUED | OUTPATIENT
Start: 2023-10-30 | End: 2023-10-30

## 2023-10-30 RX ORDER — LIDOCAINE HYDROCHLORIDE 10 MG/ML
INJECTION, SOLUTION EPIDURAL; INFILTRATION; INTRACAUDAL; PERINEURAL AS NEEDED
Status: DISCONTINUED | OUTPATIENT
Start: 2023-10-30 | End: 2023-10-30 | Stop reason: SURG

## 2023-10-30 RX ORDER — NALOXONE HYDROCHLORIDE 0.4 MG/ML
80 INJECTION, SOLUTION INTRAMUSCULAR; INTRAVENOUS; SUBCUTANEOUS AS NEEDED
OUTPATIENT
Start: 2023-10-30 | End: 2023-10-30

## 2023-10-30 RX ORDER — SODIUM CHLORIDE 9 MG/ML
INJECTION, SOLUTION INTRAVENOUS CONTINUOUS
Status: DISCONTINUED | OUTPATIENT
Start: 2023-10-30 | End: 2023-10-30

## 2023-10-30 RX ADMIN — SODIUM CHLORIDE: 9 INJECTION, SOLUTION INTRAVENOUS at 07:34:00

## 2023-10-30 RX ADMIN — LIDOCAINE HYDROCHLORIDE 50 MG: 10 INJECTION, SOLUTION EPIDURAL; INFILTRATION; INTRACAUDAL; PERINEURAL at 07:34:00

## 2023-10-30 NOTE — OPERATIVE REPORT
ESOPHAGOGASTRODUODENOSCOPY (EGD) REPORT    Yusra Goff     1965 Age 59year old   PCP Liat Grayson DO Endoscopist Samantha Davidson MD       Date of procedure: 10/30/23    Procedure: EGD w/ gastric biopsies    Pre-operative diagnosis: GERD    Post-operative diagnosis: see impression    Medications: MAC    Complications: none    Procedure:  Informed consent was obtained from the patient after the risks of the procedure were discussed, including but not limited to bleeding, perforation, aspiration, infection, or possibility of a missed lesion. After discussions of the risks/benefits and alternatives to this procedure, as well as the planned sedation, the patient was placed in the left lateral decubitus position and begun on continuous blood pressure pulse oximetry and EKG monitoring and this was maintained throughout the procedure. Once an adequate level of sedation was obtained a bite block was placed. Then the lubricated tip of the [id number] diagnostic video upper endoscope was inserted and advanced using direct visualization into the posterior pharynx and ultimately into the esophagus. The scope was then advanced through the stomach and to the second portion of the duodenum. Complications: None    Findings:      1. Esophagus: The squamocolumnar junction was noted at 35 cm and appeared regular. The diaphragmatic pinch was noted noted at 36 cm from the incisors. 1 cm sliding hiatal hernia. The esophageal mucosa appeared healthy and normal. There was no endoscopic findings of esophagitis, stricture or Jaime's esophagus. 2. Stomach: The stomach distended normally. Normal rugal folds were seen. The pylorus was patent. Retroflexion revealed a normal fundus. The gastric mucosa appeared healthy and normal. Biopsies were taken with a cold forceps from the antrum, incisura and body for histology. There were small, scattered polyps all less than 3 mm in size. Previously biopsied.     3. Duodenum: The duodenal mucosa appeared normal in the bulb and 2nd portion of the duodenum. Impression:  -Esophagus: Small hiatal hernia, otherwise unremarkable with regular z-line. No evidence of esophagitis. -Stomach: Unremarkable mucosa. Biopsied. Few, small scattered 1 mm to 3 mm polyps seen in the fundus/body -- these are fundic gland polyps. -Duodenum: Unremarkable. Recommend:  1. Await pathology. 2. Given symptoms that were previously controlled on Prevacid, we will reach out to the nephrologist to discuss ongoing use of proton pump inhibitors. 3. Continue Pepcid for now.    >>>If tissue was sampled/removed and you have not received your pathology results either by phone or letter within 2 weeks, please call our office at [phone].     Specimens:  Gastric    Blood loss: <1 ml

## 2023-10-30 NOTE — DISCHARGE INSTRUCTIONS
Home Care Instructions for Gastroscopy with Sedation    Diet:  - Resume your regular diet as tolerated unless otherwise instructed. - Start with light meals to minimize bloating.  - Do not drink alcohol today. Medication:  - If you have questions about resuming your normal medications, please contact your Primary Care Physician. Activities:  - Take it easy today. Do not return to work today. - Do not drive today. - Do not operate any machinery today (including kitchen equipment). Gastroscopy:  - You may have a sore throat for 2-3 days following the exam. This is normal. Gargling with warm salt water (1/2 tsp salt to 1 glass warm water) or using throat lozenges will help. - If you experience any sharp pain in your neck, abdomen or chest, vomiting of blood, oral temperature over 100 degrees Fahrenheit, light-headedness or dizziness, or any other problems, contact your doctor. **If unable to reach your doctor, please go to the [facility] Emergency Room**    - Your referring physician will receive a full report of your examination.  - If you do not hear from your doctor's office within two weeks of your biopsy, please call them for your results. You may be able to see your laboratory results in [facility] between 4 and 7 business days. In some cases, your physician may not have viewed the results before they are released to [address] If you have questions regarding your results contact the physician who ordered the test/exam by phone or via [address] by choosing \"Ask a Medical Question. \"

## 2023-10-31 NOTE — H&P
History & Physical Examination    Patient Name: Negar Pat  MRN: [id number]  CSN: [id number]  YOB: 1965    Diagnosis: Abdominal pain, EGD today    No medications prior to admission. No current facility-administered medications for this encounter. Allergies:   Epinephrine             OTHER (SEE COMMENTS)  Cats Claw, Uncaria *    RASH  Dander                  RASH    Comment:Dogs  Dust Mites              RASH  Grass                   RASH  Mold                    RASH    Past Medical History:   Diagnosis Date    Allergic rhinitis     Anxiety     Arthritis     Asthma Child mendoza    Depression     Esophageal reflux     Fatty liver 2023 ultrasound of abdomen    Hyperlipidemia     Hyperthyroidism      Past Surgical History:   Procedure Laterality Date    COLONOSCOPY      HYSTERECTOMY            OTHER SURGICAL HISTORY      Eye surgery    TUBAL LIGATION       Family History   Problem Relation Age of Onset    Diabetes Father     Stroke Father     Depression Father     Obesity Sister      Social History    Tobacco Use      Smoking status: Never      Smokeless tobacco: Never    Alcohol use: Yes      Alcohol/week: 3.0 - 4.0 standard drinks of alcohol      Types: 3 - 4 Standard drinks or equivalent per week      Comment: occ      SYSTEM Check if Review is Normal Check if Physical Exam is Normal If not normal, please explain:   HEENT Joel.Sill ] [ Lamar Chano  Joel.Sill ] [ X]    HEART Joel.Sill ] [ [place] Arms Joel.Sill ] [ Shyrl Hero Joel.Sill ] [ Aide Pals Joel.Sill ] [ X]    OTHER        I have discussed the risks and benefits and alternatives of the procedure with the patient/family. They understand and agree to proceed with plan of care. I have reviewed the History and Physical done within the last 30 days. Any changes noted above.     Chas Menard MD  [facility], [facility]

## 2023-12-07 ENCOUNTER — MED REC SCAN ONLY (OUTPATIENT)
Facility: CLINIC | Age: 58
End: 2023-12-07

## 2023-12-08 ENCOUNTER — HOSPITAL ENCOUNTER (OUTPATIENT)
Dept: MAMMOGRAPHY | Age: 58
Discharge: HOME | End: 2023-12-08
Attending: FAMILY MEDICINE
Payer: MEDICAID

## 2023-12-08 DIAGNOSIS — Z12.31 BREAST CANCER SCREENING BY MAMMOGRAM: ICD-10-CM

## 2023-12-08 PROCEDURE — 77067 SCR MAMMO BI INCL CAD: CPT | Performed by: FAMILY MEDICINE

## 2023-12-08 PROCEDURE — 77063 BREAST TOMOSYNTHESIS BI: CPT | Performed by: FAMILY MEDICINE

## 2023-12-11 ENCOUNTER — TELEPHONE (OUTPATIENT)
Dept: FAMILY MEDICINE CLINIC | Facility: CLINIC | Age: 58
End: 2023-12-11

## 2023-12-11 DIAGNOSIS — R92.30 DENSE BREASTS: Primary | ICD-10-CM

## 2023-12-11 NOTE — TELEPHONE ENCOUNTER
CONCLUSION:  No mammographic evidence of malignancy. BI-RADS CATEGORY:    DIAGNOSTIC CATEGORY 2--BENIGN FINDING NO CHANGE FROM COMPARISON ASSESSMENT. RECOMMENDATIONS:    ROUTINE MAMMOGRAM AND CLINICAL EVALUATION IN 12 MONTHS. Because of breast density, this patient may benefit from supplemental screening with breast MRI, Molecular Breast Imaging (MBI) or bilateral whole breast ultrasound for increased sensitivity for detection of cancer which can be obscured mammographically. Please contact your ordering provider to discuss supplemental screening options. Okay for US order?

## 2023-12-11 NOTE — TELEPHONE ENCOUNTER
Patient had her mammo Friday, they want to do an US for dense breasts, please place order so she can schedule.

## 2023-12-26 ENCOUNTER — TELEPHONE (OUTPATIENT)
Facility: CLINIC | Age: 58
End: 2023-12-26

## 2023-12-26 NOTE — TELEPHONE ENCOUNTER
Patient states that she is supposed to schedule a follow up appointment after having her procedure done, but she wants to know if the appointment can be scheduled as a video visit?

## 2023-12-27 NOTE — TELEPHONE ENCOUNTER
Jamie TIRADO    I booked the patient for phone visit on 12/29/2023 3pm. Ff-up for heartburn and weight loss.    Please send encounter back to GI RN if not ok w/ you.    Thank you

## 2023-12-28 NOTE — PROGRESS NOTES
[address] [facility] [address] Gastroenterology                                                                                                  Clinic History and Physical     No chief complaint on file. Requesting physician or provider: Светлана Salvador DO    HPI:   The patient verbally consents to a Virtual/Telephone Check-In visit on 12/29/23. Patient understands and accepts financial responsibility for any deductible, co-insurance and/or co-pays associated with this service. Duration of the service: 30 minutes     Felipe Goddard is a 59year old year-old female with medical history including lymphocytopenia, GERD, fatty liver disease, hyperlipidemia, hyperthyroidism, depression, anxiety, who presents virtually for follow up. Today:  -she reports she has been taking famotidine 20mg BID, also taking tums as needed for heartburn  -has fluctuating diarrhea & constipation. Took a probiotic in the past which helped some  -last week she did an enema and had some output but still felt bloating & constipated  -reports weight loss has stabilized, has been around 118lb the past couple months  -ordered cologuard last visit but her insurance informed her she is not due until 2024, unsure what month      Prior visit 9/19/23:  Antonia Clark from [place] to [place] in November to help care for parents, Reports increased stress related to leaving friends, trouble finding a job and stress of parents health. Pt currently talking to a therapist.  -evaluated for GERD, weight loss & CRC screen. Taking famotidine for GERD, was previously on prevacid but was told by nephrology to stop taking prevacid due to elevated creatinine. unintentional weight loss of 50lbs since November, reports weight is stable the last month.  pt does report eating less food and having low appetite, sometimes would have abdominal pain which made her not want to eat    Last colonoscopy: around 2000 (done at outside facility, normal per patient), last cologuard around  was normal   Last EGD: 10/30/23 (Dr. Farzana Madrigal)  Impression:  -Esophagus: Small hiatal hernia, otherwise unremarkable with regular z-line. No evidence of esophagitis. -Stomach: Unremarkable mucosa. Biopsied. Few, small scattered 1 mm to 3 mm polyps seen in the fundus/body -- these are fundic gland polyps. -Duodenum: Unremarkable. -Biopsies normal    2. Given symptoms that were previously controlled on Prevacid, we will reach out to the nephrologist to discuss ongoing use of proton pump inhibitors. 3. Continue Pepcid for now. NSAIDS:none   Tobacco:none   Alcohol:social  Marijuana:none   Illicit drugs:none     FH GI malignancy: none     No history of adverse reaction to sedation  No JOSE RAUL  No anticoagulants  No pacemaker/defibrillator  No pain medications and/or sleep aides    Wt Readings from Last 6 Encounters:   10/30/23 120 lb (54.4 kg)   10/23/23 123 lb 12.8 oz (56.2 kg)   10/21/23 120 lb (54.4 kg)   23 125 lb (56.7 kg)   23 125 lb (56.7 kg)   23 125 lb (56.7 kg)          History, Medications, Allergies, ROS:      Past Medical History:   Diagnosis Date    Allergic rhinitis     Anxiety     Arthritis     Asthma Child mendoza    Depression     Esophageal reflux     Fatty liver 2023 ultrasound of abdomen    Hyperlipidemia     Hyperthyroidism     Renal insufficiency 2023      Past Surgical History:   Procedure Laterality Date    COLONOSCOPY      HYSTERECTOMY      2013-          OTHER SURGICAL HISTORY      Eye surgery    TUBAL LIGATION        Family Hx:   Family History   Problem Relation Age of Onset    Diabetes Father     Stroke Father     Depression Father     Obesity Sister       Social History:   Social History     Socioeconomic History    Marital status:    Tobacco Use    Smoking status: Never     Passive exposure: Never    Smokeless tobacco: Never   Vaping Use    Vaping Use: Never used   Substance and Sexual Activity    Alcohol use:  Yes Alcohol/week: 3.0 - 4.0 standard drinks of alcohol     Types: 3 - 4 Standard drinks or equivalent per week     Comment: occ    Drug use: Never   Other Topics Concern    Caffeine Concern No    Exercise No    Seat Belt Yes    Special Diet No    Stress Concern Yes    Weight Concern No     Service No    Blood Transfusions No    Occupational Exposure No    Hobby Hazards No    Sleep Concern No    Back Care No    Bike Helmet No    Self-Exams No        Medications (Active prior to today's visit):  Current Outpatient Medications   Medication Sig Dispense Refill    ADVAIR DISKUS 100-50 MCG/ACT Inhalation Aerosol Powder, Breath Activated Inhale 1 puff into the lungs 2 (two) times daily. fluticasone propionate 50 MCG/ACT Nasal Suspension 1 spray in each nostril Nasally Once a day as needed      Estradiol 10 MCG Vaginal Tab Place 10 mcg vaginally twice a week. 24 tablet 3    levothyroxine 75 MCG Oral Tab Take 1 tablet (75 mcg total) by mouth before breakfast. 90 tablet 1    buPROPion  MG Oral Tablet 24 Hr Take 1 tablet (300 mg total) by mouth daily. 90 tablet 1    ALPRAZolam 0.5 MG Oral Tab Take 1 tablet (0.5 mg total) by mouth daily as needed for Anxiety. 30 tablet 1    losartan 50 MG Oral Tab Take 1 tablet (50 mg total) by mouth 2 (two) times daily. 180 tablet 1    montelukast 10 MG Oral Tab Take 1 tablet (10 mg total) by mouth nightly. 90 tablet 1    traZODone 150 MG Oral Tab Take 1 tablet (150 mg total) by mouth nightly as needed for Sleep. 90 tablet 1    famotidine 20 MG Oral Tab Take 1 tablet (20 mg total) by mouth in the morning, at noon, and at bedtime. 3-4 time daily      CRANBERRY OR Take 15 mg by mouth daily. loratadine 10 MG Oral Tab Take 1 tablet (10 mg total) by mouth daily as needed for Allergies. Lifitegrast (Edra Brigette OP) Apply to eye 2 (two) times a day. albuterol 108 (90 Base) MCG/ACT Inhalation Aero Soln Inhale 2 puffs into the lungs every 4 (four) hours as needed. valACYclovir 1 G Oral Tab Take 1 tablet (1,000 mg total) by mouth as needed. 2x 12      Ascorbic Acid (VITAMIN C) 100 MG Oral Tab Take 1 tablet (100 mg total) by mouth daily. zinc sulfate 220 (50 Zn) MG Oral Cap Take 25 mg by mouth daily. Multiple Vitamins-Minerals (MULTI-VITAMIN/MINERALS) Oral Tab Take 1 tablet by mouth daily. Allergies: Allergies   Allergen Reactions    Epinephrine OTHER (SEE COMMENTS)    Cats Claw, Uncaria Tomentosa RASH    Dander RASH     Dogs      Dust Mites RASH    Grass RASH    Mold RASH       ROS:   CONSTITUTIONAL: negative for fevers, chills, sweats  EYES Negative for scleral icterus or redness, and diplopia   HEENT: Negative for dysphagia and hoarseness  RESPIRATORY: Negative for cough and severe shortness of breath  CARDIOVASCULAR: Negative for crushing sub-sternal chest pain  GASTROINTESTINAL: See HPI  GENITOURINARY: Negative for dysuria  MUSCULOSKELETAL: Negative for arthralgias and myalgias  SKIN: Negative for jaundice, rash or pruritus  NEUROLOGICAL: Negative for dizziness and headaches  BEHAVIOR/PSYCH: Negative for psychotic behavior and poor appetite      PHYSICAL EXAM:   There were no vitals taken for this visit. Limited 2/2 virtual visit    GEN: NAD  LUNGS: No conversational dyspnea  NEURO: Alert and interactive     Labs/Imaging:     Patient's labs and imaging were reviewed and discussed with patient today. .  ASSESSMENT/PLAN:   Chris Fish is a 59year old year-old female with medical history including lymphocytopenia, GERD, fatty liver disease, hyperlipidemia, hyperthyroidism, depression, anxiety, who presents virtually for follow up. Today:  -she reports she has been taking famotidine 20mg BID, also taking tums as needed for heartburn  -has fluctuating diarrhea & constipation.  Took a probiotic in the past which helped some  -last week she did an enema and had some output but still felt bloating & constipated  -reports weight loss has stabilized, has been around 118lb the past couple months  -ordered cologuard last visit but her insurance informed her she is not due until 2024, unsure what month    #heartburn  -Differentials include but not limited to: GERD, dietary pattern induced, reflux hypersensitivity or functional heartburn    #weight loss  -has stabilized since prior visit  -pt seen by hem/onc on 8/16/23, seen by nephrology on 7/31/23, and last seen by PCP on 9/7/23  -hem/onc impression/plan:  Wt loss: Unclear etiology. Fortunately, there was no evidence of malignancy on scan. At this point, I recommend a GI evaluation for possible EGD, C-Scope and malabsorption workup.   -patient does not want to pursue colonoscopy due to feeling very ill with bowel prep.  Pt agreeable to pursue colonoscopy if cologuard is positive    Recommend:  -call to schedule xray abdomen to assess for stool burden: [phone]    -when you get your xray stop at lab to check kidney function (CMP ordered)    -if xray positive for stool burden I will recommend a bowel wash out      For heartburn:    -if creatinine okay on blood work I will recommend we restart a PPI (proton pump inhibitor) such as prevacid      For chronic constipation/diarrhea:    -can take daily probiotic, eating prunes, water intake, physical activity, adequate sleep    -can experiment with daily fiber supplement & miralax    Fiber Supplements (pick one)  -Metamucil or Konsyl (psyllium- both soluble & insoluble) *  -Fibercon (polycarbophil- mostly insoluble)  -Citrucel (methylcellulose mostly soluble)   -Benefiber (wheat dextrin- mostly soluble)    Soluble better for diarrhea, insoluble better for constipation    Orders This Visit:  Orders Placed This Encounter   Procedures    Comp Metabolic Panel (14)       Meds This Visit:  Requested Prescriptions      No prescriptions requested or ordered in this encounter       Imaging & Referrals:  XR ABDOMEN, OBSTRUCTIVE SERIES 3 VIEWS(PYT=[id number])       Samantha Cao Sherif, [address] [facility]  12/29/2023      The dictation was partially prepared using [employer] [place] [employer] voice recognition software. As a result, errors may occur. When identified, these errors have been corrected.  While every attempt is made to correct errors during dictation, discrepancies may still exist.

## 2023-12-29 ENCOUNTER — VIRTUAL PHONE E/M (OUTPATIENT)
Facility: CLINIC | Age: 58
End: 2023-12-29
Payer: MEDICAID

## 2023-12-29 DIAGNOSIS — R12 HEARTBURN: Primary | ICD-10-CM

## 2023-12-29 DIAGNOSIS — R19.7 DIARRHEA, UNSPECIFIED TYPE: ICD-10-CM

## 2023-12-29 DIAGNOSIS — K59.00 CONSTIPATION, UNSPECIFIED CONSTIPATION TYPE: ICD-10-CM

## 2023-12-29 DIAGNOSIS — R63.4 UNINTENTIONAL WEIGHT LOSS: ICD-10-CM

## 2023-12-29 NOTE — PATIENT INSTRUCTIONS
-call to schedule xray abdomen to assess for stool burden: [phone]    -when you get your xray stop at lab to check kidney function (CMP ordered)    -if xray positive for stool burden I will recommend a bowel wash out      For heartburn:    -if creatinine okay on blood work I will recommend we restart a PPI (proton pump inhibitor) such as prevacid      For chronic constipation/diarrhea:    -can take daily probiotic, eating prunes, water intake, physical activity, adequate sleep    -can experiment with daily fiber supplement & miralax    Fiber Supplements (pick one)  -Metamucil or Konsyl (psyllium- both soluble & insoluble) *  -Fibercon (polycarbophil- mostly insoluble)  -Citrucel (methylcellulose mostly soluble)   -Benefiber (wheat dextrin- mostly soluble)    Soluble better for diarrhea, insoluble better for constipation

## 2024-01-04 ENCOUNTER — HOSPITAL ENCOUNTER (OUTPATIENT)
Dept: GENERAL RADIOLOGY | Age: 59
Discharge: HOME | End: 2024-01-04
Payer: MEDICAID

## 2024-01-04 ENCOUNTER — LAB ENCOUNTER (OUTPATIENT)
Dept: LAB | Age: 59
End: 2024-01-04
Payer: MEDICAID

## 2024-01-04 DIAGNOSIS — R19.7 DIARRHEA, UNSPECIFIED TYPE: ICD-10-CM

## 2024-01-04 DIAGNOSIS — K59.00 CONSTIPATION, UNSPECIFIED CONSTIPATION TYPE: ICD-10-CM

## 2024-01-04 LAB
ALBUMIN SERPL-MCNC: 3.8 G/DL (ref 3.4–5)
ALBUMIN/GLOB SERPL: 1.3 {RATIO} (ref 1–2)
ALP LIVER SERPL-CCNC: 73 U/L
ALT SERPL-CCNC: 14 U/L
ANION GAP SERPL CALC-SCNC: 3 MMOL/L (ref 0–18)
AST SERPL-CCNC: 12 U/L (ref 15–37)
BILIRUB SERPL-MCNC: 0.3 MG/DL (ref 0.1–2)
BUN BLD-MCNC: 11 MG/DL (ref 9–23)
CALCIUM BLD-MCNC: 9.2 MG/DL (ref 8.5–10.1)
CHLORIDE SERPL-SCNC: 106 MMOL/L (ref 98–112)
CO2 SERPL-SCNC: 31 MMOL/L (ref 21–32)
CREAT BLD-MCNC: 1.09 MG/DL
EGFRCR SERPLBLD CKD-EPI 2021: 58 ML/MIN/1.73M2 (ref 60–?)
FASTING STATUS PATIENT QL REPORTED: NO
GLOBULIN PLAS-MCNC: 3 G/DL (ref 2.8–4.4)
GLUCOSE BLD-MCNC: 86 MG/DL (ref 70–99)
OSMOLALITY SERPL CALC.SUM OF ELEC: 289 MOSM/KG (ref 275–295)
POTASSIUM SERPL-SCNC: 3.4 MMOL/L (ref 3.5–5.1)
PROT SERPL-MCNC: 6.8 G/DL (ref 6.4–8.2)
SODIUM SERPL-SCNC: 140 MMOL/L (ref 136–145)

## 2024-01-04 PROCEDURE — 36415 COLL VENOUS BLD VENIPUNCTURE: CPT

## 2024-01-04 PROCEDURE — 80053 COMPREHEN METABOLIC PANEL: CPT

## 2024-01-04 PROCEDURE — 74019 RADEX ABDOMEN 2 VIEWS: CPT

## 2024-01-05 ENCOUNTER — TELEPHONE (OUTPATIENT)
Facility: CLINIC | Age: 59
End: 2024-01-05

## 2024-01-05 DIAGNOSIS — R12 HEARTBURN: Primary | ICD-10-CM

## 2024-01-05 DIAGNOSIS — R79.89 ELEVATED SERUM CREATININE: ICD-10-CM

## 2024-01-05 RX ORDER — PANTOPRAZOLE SODIUM 40 MG/1
40 TABLET, DELAYED RELEASE ORAL
Qty: 90 TABLET | Refills: 0 | Status: SHIPPED | OUTPATIENT
Start: 2024-01-05 | End: 2024-04-08

## 2024-01-06 NOTE — TELEPHONE ENCOUNTER
----- Message from BRENT Zapata sent at 1/5/2024  9:06 AM CST -----  CMP with improving creatinine & GFR.   Starting PPI,     GI RNs - please recall in 1 month (2/5/24) for repeat CMP, lab is ordered. Thank you    -BRENT Zapata

## 2024-01-25 ENCOUNTER — OFFICE VISIT (OUTPATIENT)
Dept: FAMILY MEDICINE CLINIC | Facility: CLINIC | Age: 59
End: 2024-01-25
Payer: MEDICAID

## 2024-01-25 VITALS
HEART RATE: 86 BPM | TEMPERATURE: 98 F | OXYGEN SATURATION: 98 % | WEIGHT: 116 LBS | DIASTOLIC BLOOD PRESSURE: 80 MMHG | SYSTOLIC BLOOD PRESSURE: 128 MMHG | HEIGHT: 61 IN | BODY MASS INDEX: 21.9 KG/M2 | RESPIRATION RATE: 16 BRPM

## 2024-01-25 DIAGNOSIS — J02.9 SORE THROAT: Primary | ICD-10-CM

## 2024-01-25 LAB
CONTROL LINE PRESENT WITH A CLEAR BACKGROUND (YES/NO): YES YES/NO
KIT LOT #: NORMAL NUMERIC
STREP GRP A CUL-SCR: NEGATIVE

## 2024-01-25 PROCEDURE — 3008F BODY MASS INDEX DOCD: CPT | Performed by: NURSE PRACTITIONER

## 2024-01-25 PROCEDURE — 3074F SYST BP LT 130 MM HG: CPT | Performed by: NURSE PRACTITIONER

## 2024-01-25 PROCEDURE — 87880 STREP A ASSAY W/OPTIC: CPT | Performed by: NURSE PRACTITIONER

## 2024-01-25 PROCEDURE — 99213 OFFICE O/P EST LOW 20 MIN: CPT | Performed by: NURSE PRACTITIONER

## 2024-01-25 PROCEDURE — 3079F DIAST BP 80-89 MM HG: CPT | Performed by: NURSE PRACTITIONER

## 2024-01-25 RX ORDER — LIFITEGRAST 50 MG/ML
1 SOLUTION/ DROPS OPHTHALMIC 2 TIMES DAILY
COMMUNITY
Start: 2024-01-13

## 2024-01-25 RX ORDER — OFLOXACIN 3 MG/ML
SOLUTION/ DROPS OPHTHALMIC
COMMUNITY
Start: 2023-12-27

## 2024-01-25 RX ORDER — DILTIAZEM HYDROCHLORIDE 60 MG/1
2 TABLET, FILM COATED ORAL 2 TIMES DAILY
COMMUNITY
Start: 2024-01-12

## 2024-01-26 NOTE — PATIENT INSTRUCTIONS
I recommend the 4 H's for inflammation:    1. Heat (warm mist from the shower or warm liquids such as tea)  2. Honey (mixed in your tea or by the spoonful [if you are not diabetic; over the age of 1 year]--take a spoonful 3 times a day and don't eat or drink anything for 15-20 minutes)  3. Humidity--cool mist in the bedroom at night  4. Hydration --at least 8 -10 glasses a day

## 2024-01-26 NOTE — PROGRESS NOTES
CHIEF COMPLAINT:     Chief Complaint   Patient presents with    Sore Throat     R>L ear pain and sore throat for 1 day.  OTC meds taken.           HPI:   Julieta Goff is a 58 year old female presents to clinic with complaint of sore throat. Patient has had 1 days. Symptoms have been mild since onset.  Patient reports following associated symptoms: ear pain. Patient denies headache. Patient denies nausea.  Patient denies rash. Patient denies history of strep throat. Patient denies strep pharyngitis exposure. Treating symptoms with nothing.    Current Outpatient Medications   Medication Sig Dispense Refill    SYMBICORT 80-4.5 MCG/ACT Inhalation Aerosol Inhale 2 puffs into the lungs 2 (two) times daily.      ofloxacin 0.3 % Ophthalmic Solution       XIIDRA 5 % Ophthalmic Solution Place 1 drop into both eyes 2 (two) times daily.      PEG 3350-KCl-Na Bicarb-NaCl (TRILYTE) 420 g Oral Recon Soln Take as directed by GI clinic. Okay to substitute for generic. 1 each 0    pantoprazole 40 MG Oral Tab EC Take 1 tablet (40 mg total) by mouth every morning before breakfast. 90 tablet 0    ADVAIR DISKUS 100-50 MCG/ACT Inhalation Aerosol Powder, Breath Activated Inhale 1 puff into the lungs 2 (two) times daily. (Patient not taking: Reported on 1/25/2024)      fluticasone propionate 50 MCG/ACT Nasal Suspension 1 spray in each nostril Nasally Once a day as needed      levothyroxine 75 MCG Oral Tab Take 1 tablet (75 mcg total) by mouth before breakfast. 90 tablet 1    buPROPion  MG Oral Tablet 24 Hr Take 1 tablet (300 mg total) by mouth daily. 90 tablet 1    ALPRAZolam 0.5 MG Oral Tab Take 1 tablet (0.5 mg total) by mouth daily as needed for Anxiety. 30 tablet 1    losartan 50 MG Oral Tab Take 1 tablet (50 mg total) by mouth 2 (two) times daily. 180 tablet 1    montelukast 10 MG Oral Tab Take 1 tablet (10 mg total) by mouth nightly. 90 tablet 1    traZODone 150 MG Oral Tab Take 1 tablet (150 mg total) by mouth nightly as  needed for Sleep. 90 tablet 1    famotidine 20 MG Oral Tab Take 1 tablet (20 mg total) by mouth in the morning, at noon, and at bedtime. 3-4 time daily      CRANBERRY OR Take 15 mg by mouth daily.      loratadine 10 MG Oral Tab Take 1 tablet (10 mg total) by mouth daily as needed for Allergies.      Lifitegrast (XIIDRA OP) Apply to eye 2 (two) times a day.      albuterol 108 (90 Base) MCG/ACT Inhalation Aero Soln Inhale 2 puffs into the lungs every 4 (four) hours as needed.      valACYclovir 1 G Oral Tab Take 1 tablet (1,000 mg total) by mouth as needed. 2x 12      Ascorbic Acid (VITAMIN C) 100 MG Oral Tab Take 1 tablet (100 mg total) by mouth daily.      zinc sulfate 220 (50 Zn) MG Oral Cap Take 25 mg by mouth daily.      Multiple Vitamins-Minerals (MULTI-VITAMIN/MINERALS) Oral Tab Take 1 tablet by mouth daily.        Past Medical History:   Diagnosis Date    Allergic rhinitis     Anxiety     Arthritis     Asthma Child mendoza    Depression     Esophageal reflux     Fatty liver 08/03/2023 7/27/2023 ultrasound of abdomen    Hyperlipidemia     Hyperthyroidism     Renal insufficiency 03/06/2023      Social History:  Social History     Socioeconomic History    Marital status:    Tobacco Use    Smoking status: Never     Passive exposure: Never    Smokeless tobacco: Never   Vaping Use    Vaping Use: Never used   Substance and Sexual Activity    Alcohol use: Yes     Alcohol/week: 3.0 - 4.0 standard drinks of alcohol     Types: 3 - 4 Standard drinks or equivalent per week     Comment: occ    Drug use: Never   Other Topics Concern    Caffeine Concern No    Exercise No    Seat Belt Yes    Special Diet No    Stress Concern Yes    Weight Concern No     Service No    Blood Transfusions No    Occupational Exposure No    Hobby Hazards No    Sleep Concern No    Back Care No    Bike Helmet No    Self-Exams No        REVIEW OF SYSTEMS:   GENERAL HEALTH: no change in appetite  SKIN: Per HPI  HEENT: denies ear pain,  See HPI  RESPIRATORY: denies shortness of breath or wheezing  CARDIOVASCULAR: denies chest pain or palpitations   GI: denies vomiting or diarrhea  NEURO: denies dizziness or lightheadedness    EXAM:   /80   Pulse 86   Temp 98.4 °F (36.9 °C) (Oral)   Resp 16   Ht 5' 1\" (1.549 m)   Wt 116 lb (52.6 kg)   SpO2 98%   BMI 21.92 kg/m²   GENERAL: well developed, well nourished,in no apparent distress  SKIN: no rashes,no suspicious lesions  HEAD: atraumatic, normocephalic  EYES: conjunctiva clear, EOM intact  EARS: TM's clear, non-injected, no bulging, retraction, or fluid bilaterally  NOSE: nostrils patent, clear nasal discharge, nasal mucosa not inflamed  THROAT: oral mucosa pink, moist. Posterior pharynx not erythematous and injected. no exudates. Tonsils 2/4.  Breath is not malodorous   NECK: supple  LUNGS: clear to auscultation bilaterally, no wheezes or rhonchi. Breathing is non labored.  CARDIO: RRR without murmur  GI: good BS's,no masses, hepatosplenomegaly, or tenderness on direct palpation  EXTREMITIES: no cyanosis, clubbing or edema  LYMPH: no anterior cervical. no submandibular lymphadenopathy.  No posterior cervical or occipital lymphadenopathy.    Recent Results (from the past 24 hour(s))   Rapid Strep    Collection Time: 01/25/24  8:31 AM   Result Value Ref Range    Strep Grp A Screen Negative Negative    Control Line Present with a clear background (yes/no) Yes Yes/No    Kit Lot # [id number] Numeric    Kit Expiration Date 4/22/2025 Date         ASSESSMENT AND PLAN:   Assessment:   Encounter Diagnosis   Name Primary?    Sore throat Yes       Plan: Discussed that due to symptoms and negative rapid strep this is most likely viral and does not require antibiotics. Will not send throat culture as discussed     Discussed possibility of mononucleosis infection, but at this time symptoms are not reflective of mono infection.  If s/sx persist will consider MonoSpot or further lab work.     Comfort measures  explained and discussed as listed in Patient Instructions    Follow up with PCP in 3-5 days if not improving, condition worsens, or fever greater than or equal to 100.4 persists for 72 hours.    The patient/parent indicates understanding of these issues and agrees to the plan.    There are no Patient Instructions on file for this visit.

## 2024-01-29 ENCOUNTER — TELEMEDICINE (OUTPATIENT)
Dept: FAMILY MEDICINE CLINIC | Facility: CLINIC | Age: 59
End: 2024-01-29
Payer: MEDICAID

## 2024-01-29 DIAGNOSIS — J01.10 ACUTE NON-RECURRENT FRONTAL SINUSITIS: Primary | ICD-10-CM

## 2024-01-29 PROCEDURE — 99213 OFFICE O/P EST LOW 20 MIN: CPT | Performed by: FAMILY MEDICINE

## 2024-01-29 RX ORDER — AMOXICILLIN AND CLAVULANATE POTASSIUM 875; 125 MG/1; MG/1
1 TABLET, FILM COATED ORAL 2 TIMES DAILY
Qty: 20 TABLET | Refills: 0 | Status: SHIPPED | OUTPATIENT
Start: 2024-01-29 | End: 2024-02-08

## 2024-01-29 NOTE — PROGRESS NOTES
This is a telemedicine visit with live, interactive video and/or audio.     Patient understands and accepts financial responsibility for any deductible, co-insurance and/or co-pays associated with this service.    SUBJECTIVE  This is a 58 year old female evaluation of a sinus pressure, congestion, & sinus pain.  Onset was 7days . The sinus pressure and congrestion has been on going and getting worse. Treatments tried OTC sinus medications. Thick mucus, sinus pressure and sinus pain. Denies current fevers, chills, lung disease, asthma, copd, weight loss,  smoking, heart disease. Sick contacts: Denies. Recent travel: Denies Exposure to TB: Denies.      Results for orders placed or performed in visit on 24   Rapid Strep   Result Value Ref Range    Strep Grp A Screen Negative Negative    Control Line Present with a clear background (yes/no) Yes Yes/No    Kit Lot # [id number] Numeric    Kit Expiration Date 2025 Date       HISTORY:  Past Medical History:   Diagnosis Date    Allergic rhinitis     Anxiety     Arthritis     Asthma Child mendoza    Depression     Esophageal reflux     Fatty liver 2023 ultrasound of abdomen    Hyperlipidemia     Hyperthyroidism     Renal insufficiency 2023      Past Surgical History:   Procedure Laterality Date    COLONOSCOPY      HYSTERECTOMY      -          OTHER SURGICAL HISTORY      Eye surgery    TUBAL LIGATION        Family History   Problem Relation Age of Onset    Diabetes Father     Stroke Father     Depression Father     Obesity Sister       Social History     Socioeconomic History    Marital status:    Tobacco Use    Smoking status: Never     Passive exposure: Never    Smokeless tobacco: Never   Vaping Use    Vaping Use: Never used   Substance and Sexual Activity    Alcohol use: Yes     Alcohol/week: 3.0 - 4.0 standard drinks of alcohol     Types: 3 - 4 Standard drinks or equivalent per week     Comment: occ    Drug use: Never    Other Topics Concern    Caffeine Concern No    Exercise No    Seat Belt Yes    Special Diet No    Stress Concern Yes    Weight Concern No     Service No    Blood Transfusions No    Occupational Exposure No    Hobby Hazards No    Sleep Concern No    Back Care No    Bike Helmet No    Self-Exams No        Allergies   Allergen Reactions    Epinephrine OTHER (SEE COMMENTS)    Cats Claw, Uncaria Tomentosa RASH    Dander RASH     Dogs      Dust Mites RASH    Grass RASH    Mold RASH      Current Outpatient Medications   Medication Sig Dispense Refill    amoxicillin clavulanate 875-125 MG Oral Tab Take 1 tablet by mouth 2 (two) times daily for 10 days. Take with food to minimize upset stomach. 20 tablet 0    PEG 3350-KCl-Na Bicarb-NaCl (TRILYTE) 420 g Oral Recon Soln Take as directed by GI clinic. Okay to substitute for generic. 1 each 0    SYMBICORT 80-4.5 MCG/ACT Inhalation Aerosol Inhale 2 puffs into the lungs 2 (two) times daily.      ofloxacin 0.3 % Ophthalmic Solution       XIIDRA 5 % Ophthalmic Solution Place 1 drop into both eyes 2 (two) times daily.      pantoprazole 40 MG Oral Tab EC Take 1 tablet (40 mg total) by mouth every morning before breakfast. 90 tablet 0    ADVAIR DISKUS 100-50 MCG/ACT Inhalation Aerosol Powder, Breath Activated Inhale 1 puff into the lungs 2 (two) times daily. (Patient not taking: Reported on 1/25/2024)      fluticasone propionate 50 MCG/ACT Nasal Suspension 1 spray in each nostril Nasally Once a day as needed      levothyroxine 75 MCG Oral Tab Take 1 tablet (75 mcg total) by mouth before breakfast. 90 tablet 1    buPROPion  MG Oral Tablet 24 Hr Take 1 tablet (300 mg total) by mouth daily. 90 tablet 1    ALPRAZolam 0.5 MG Oral Tab Take 1 tablet (0.5 mg total) by mouth daily as needed for Anxiety. 30 tablet 1    losartan 50 MG Oral Tab Take 1 tablet (50 mg total) by mouth 2 (two) times daily. 180 tablet 1    montelukast 10 MG Oral Tab Take 1 tablet (10 mg total) by mouth  nightly. 90 tablet 1    traZODone 150 MG Oral Tab Take 1 tablet (150 mg total) by mouth nightly as needed for Sleep. 90 tablet 1    famotidine 20 MG Oral Tab Take 1 tablet (20 mg total) by mouth in the morning, at noon, and at bedtime. 3-4 time daily      CRANBERRY OR Take 15 mg by mouth daily.      loratadine 10 MG Oral Tab Take 1 tablet (10 mg total) by mouth daily as needed for Allergies.      Lifitegrast (XIIDRA OP) Apply to eye 2 (two) times a day.      albuterol 108 (90 Base) MCG/ACT Inhalation Aero Soln Inhale 2 puffs into the lungs every 4 (four) hours as needed.      valACYclovir 1 G Oral Tab Take 1 tablet (1,000 mg total) by mouth as needed. 2x 12      Ascorbic Acid (VITAMIN C) 100 MG Oral Tab Take 1 tablet (100 mg total) by mouth daily.      zinc sulfate 220 (50 Zn) MG Oral Cap Take 25 mg by mouth daily.      Multiple Vitamins-Minerals (MULTI-VITAMIN/MINERALS) Oral Tab Take 1 tablet by mouth daily.         OBJECTIVE  Physical Exam:   Speaking in full sentences comfortably and Normal work of breathing    ASSESSMENT & PLAN  Diagnoses and all orders for this visit:    Acute non-recurrent frontal sinusitis  -     amoxicillin clavulanate 875-125 MG Oral Tab; Take 1 tablet by mouth 2 (two) times daily for 10 days. Take with food to minimize upset stomach.    Recommended increased fluids/humidity  12-hour decongestant nasal spray twice daily for a maximum of 4 days  Steam inhalation for sinus pressure   OTC cold and flu medication as needed.   Robitussin or Robitussin DM as needed for cough  1 tsp honey for the cough prn    Tylenol as needed/Ibuprofen as needed, do not exceed 4000 mg of acetaminophen or 2400 mg of ibuprofen          Follow Up:    Recheck if not improved in one week or sooner if worsening.    Plan discussed with patient. All questions answered. Patient is agreeable to this plan of care.         BRENT Morris

## 2024-02-04 ENCOUNTER — HOSPITAL ENCOUNTER (EMERGENCY)
Age: 59
Discharge: HOME | End: 2024-02-04
Attending: EMERGENCY MEDICINE
Payer: MEDICAID

## 2024-02-04 ENCOUNTER — APPOINTMENT (OUTPATIENT)
Dept: GENERAL RADIOLOGY | Age: 59
End: 2024-02-04
Payer: MEDICAID

## 2024-02-04 VITALS
HEART RATE: 97 BPM | SYSTOLIC BLOOD PRESSURE: 103 MMHG | TEMPERATURE: 99 F | OXYGEN SATURATION: 98 % | DIASTOLIC BLOOD PRESSURE: 67 MMHG | BODY MASS INDEX: 22 KG/M2 | WEIGHT: 115 LBS | RESPIRATION RATE: 18 BRPM

## 2024-02-04 DIAGNOSIS — S22.31XA CLOSED FRACTURE OF ONE RIB OF RIGHT SIDE, INITIAL ENCOUNTER: Primary | ICD-10-CM

## 2024-02-04 PROCEDURE — 71101 X-RAY EXAM UNILAT RIBS/CHEST: CPT | Performed by: EMERGENCY MEDICINE

## 2024-02-04 PROCEDURE — 99284 EMERGENCY DEPT VISIT MOD MDM: CPT

## 2024-02-04 PROCEDURE — 99283 EMERGENCY DEPT VISIT LOW MDM: CPT

## 2024-02-04 RX ORDER — HYDROCODONE BITARTRATE AND ACETAMINOPHEN 5; 325 MG/1; MG/1
1-2 TABLET ORAL EVERY 6 HOURS PRN
Qty: 12 TABLET | Refills: 0 | Status: SHIPPED | OUTPATIENT
Start: 2024-02-04 | End: 2024-02-09

## 2024-02-04 RX ORDER — IBUPROFEN 600 MG/1
600 TABLET ORAL ONCE
Status: COMPLETED | OUTPATIENT
Start: 2024-02-04 | End: 2024-02-04

## 2024-02-04 RX ORDER — IBUPROFEN 600 MG/1
600 TABLET ORAL EVERY 6 HOURS
Qty: 28 TABLET | Refills: 0 | Status: SHIPPED | OUTPATIENT
Start: 2024-02-04 | End: 2024-02-11

## 2024-02-04 NOTE — DISCHARGE INSTRUCTIONS
Please return to the Emergency department/clinic if symptoms worsen or you develop new symptoms.  Follow up with your primary care physician in 2 days. Take any medications prescribed to you as instructed.    You may take 600 mg of ibuprofen every 6 hours with food.  Do this routinely for the next 3-5 days. You may then use it as needed for pain/swelling.  If you develop severe abdominal pain or dark, tarry stool, stop this medication immediately and seek medical care.    Narcotic pain medicine should be used as needed. Do not drive or operate machinery on this medication.  It may make you nauseated, dizzy, sleepy and is not intended for long term use. It may also cause constipation.  Drink plenty of water and eat a lot of fiber while taking it to prevent this side effect. These medications have components that can lead to dependence or abuse, so use them cautiously.

## 2024-02-04 NOTE — ED PROVIDER NOTES
Patient Seen in: [facility] [place]      History     Chief Complaint   Patient presents with    Trauma     Pt states that she tripped and hit her right side of her ribs on a  last night     Stated Complaint: right side rib pain. Pt states that she fell into a  seat    Subjective:   YAMILA Rendon is a 58-year-old female who presents today for evaluation of right rib pain.  She states that yesterday evening, she tripped and fell onto her right side, striking the seat of a lawnmower.  Since then, she has had quite a bit of pain to the lateral and posterior aspects of her right rib cage.  She states it is very painful to breathe, cough or laugh.  She denies dyspnea and hemoptysis.  She denies preceding symptoms prior to her fall, head injury and loss of consciousness.    Objective:   Past Medical History:   Diagnosis Date    Allergic rhinitis     Anxiety     Arthritis     Asthma Child mendoza    Depression     Esophageal reflux     Fatty liver 2023 ultrasound of abdomen    Hyperlipidemia     Hyperthyroidism     Renal insufficiency 2023              Past Surgical History:   Procedure Laterality Date    COLONOSCOPY      HYSTERECTOMY                OTHER SURGICAL HISTORY      Eye surgery    TUBAL LIGATION                  Social History     Socioeconomic History    Marital status:    Tobacco Use    Smoking status: Never     Passive exposure: Never    Smokeless tobacco: Never   Vaping Use    Vaping Use: Never used   Substance and Sexual Activity    Alcohol use: Yes     Alcohol/week: 3.0 - 4.0 standard drinks of alcohol     Types: 3 - 4 Standard drinks or equivalent per week     Comment: occ    Drug use: Never   Other Topics Concern    Caffeine Concern No    Exercise No    Seat Belt Yes    Special Diet No    Stress Concern Yes    Weight Concern No     Service No    Blood Transfusions No    Occupational Exposure No    Hobby  Hazards No    Sleep Concern No    Back Care No    Bike Helmet No    Self-Exams No              Review of Systems    Positive for stated complaint: right side rib pain. Pt states that she fell into a  seat  Other systems are as noted in HPI.  Constitutional and vital signs reviewed.      All other systems reviewed and negative except as noted above.    Physical Exam     ED Triage Vitals [02/04/24 1246]   /67   Pulse 97   Resp 18   Temp 98.5 °F (36.9 °C)   Temp src Temporal   SpO2 98 %   O2 Device None (Room air)       Current:/67   Pulse 97   Temp 98.5 °F (36.9 °C) (Temporal)   Resp 18   Wt 52.2 kg   SpO2 98%   BMI 21.73 kg/m²         Physical Exam  Nursing note reviewed. Vital signs reviewed.  General: A&O x 3; well-developed; well-nourished; in mild distress due to pain, holding an ice pack to her right side  Head: Normocephalic, atraumatic  Chest: Guarded expansion.  Tender to palpation over the posterior and lateral aspect of the lower rib cage.  Cardio: RRR, no murmurs/clicks/rubs, capillary refill brisk, normal distal pulses  Pulmonary: Normal respirations, lungs CTA  Musculoskeletal: Normal movement of all extremities, no deformity, edema or erythema  Neurological: CN III - XII grossly intact, full strength and sensation in BL extremities        ED Course   Labs Reviewed - No data to display  This case is discussed with Dr. Holt who agrees with the plan of care.      Imaging ordered and independently visualized and interpreted by myself, along with review of radiologist's interpretation and noted the following: Single, nondisplaced fracture, rib 10  Radiology read:  XR RIBS WITH CHEST (3 VIEWS), RIGHT  (CPT=71101)    Result Date: 2/4/2024  PROCEDURE:  XR RIBS WITH CHEST (3 VIEWS), RIGHT  (CPT=71101)  TECHNIQUE:  PA Chest and three views of the ribs were obtained  COMPARISON:  [facility], XR, XR CHEST PA + LAT CHEST (CPT=71046), 7/27/2023, 8:17 AM.  INDICATIONS:  right side rib  pain. Pt states that she fell into a  seat  PATIENT STATED HISTORY: (As transcribed by Technologist)  Pt has right side rib pain. Pt states that she fell into a  seat yesterday.    FINDINGS:   Frontal view chest shows no acute process. Normal heart size. No evidence for acute pneumonia, pleural effusion, pneumothorax, or other abnormalities.  3 views of the ribs show fracture of the right 10th rib posterolaterally.  No displacement.  No other definite fractures seen.            CONCLUSION:  Positive for fracture involving the right 10th rib  LOCATION:  [facility]     Dictated by (CST): Fabian Griffin MD on 2/04/2024 at 1:46 PM     Finalized by (CST): Fabian Griffin MD on 2/04/2024 at 1:49 PM        I reviewed the images with the patient in the exam room.  She is given incentive spirometer and instructed on its use.  She is given ibuprofen in the emergency department.             MDM          Medical Decision Making  Julieta is a 58-year-old female who presents today for evaluation of right lateral and posterior chest wall pain.  She fell yesterday evening.  Differential diagnosis includes, but is not limited to rib fracture, rib contusion, traumatic pneumothorax, and other potentially life-threatening processes.  X-ray shows single, nondisplaced rib fracture, but no pneumothorax.  Patient is informed of this finding.  Pain management discussed.  Return precautions given.  She expresses understanding and agrees.    Amount and/or Complexity of Data Reviewed  Radiology: ordered and independent interpretation performed. Decision-making details documented in ED Course.    Risk  OTC drugs.  Prescription drug management.        Disposition and Plan     Clinical Impression:  1. Closed fracture of one rib of right side, initial encounter         Disposition:  Discharge  2/4/2024  1:54 pm    Follow-up:  Tung Do DO  [address]  [address]  [facility] [address]  [phone]    Schedule an appointment  as soon as possible for a visit            Medications Prescribed:  Discharge Medication List as of 2/4/2024  1:55 PM        START taking these medications    Details   HYDROcodone-acetaminophen 5-325 MG Oral Tab Take 1-2 tablets by mouth every 6 (six) hours as needed for Pain., Normal, Disp-12 tablet, R-0      ibuprofen 600 MG Oral Tab Take 1 tablet (600 mg total) by mouth every 6 (six) hours for 7 days., Normal, Disp-28 tablet, R-0

## 2024-02-06 DIAGNOSIS — J30.1 ALLERGIC RHINITIS DUE TO POLLEN, UNSPECIFIED SEASONALITY: ICD-10-CM

## 2024-02-06 DIAGNOSIS — J45.40 MODERATE PERSISTENT ASTHMA WITHOUT COMPLICATION: ICD-10-CM

## 2024-02-07 RX ORDER — MONTELUKAST SODIUM 10 MG/1
10 TABLET ORAL NIGHTLY
Qty: 90 TABLET | Refills: 1 | OUTPATIENT
Start: 2024-02-07

## 2024-02-10 DIAGNOSIS — I10 ESSENTIAL HYPERTENSION: ICD-10-CM

## 2024-02-12 ENCOUNTER — OFFICE VISIT (OUTPATIENT)
Dept: FAMILY MEDICINE CLINIC | Facility: CLINIC | Age: 59
End: 2024-02-12
Payer: MEDICAID

## 2024-02-12 VITALS
BODY MASS INDEX: 22.66 KG/M2 | RESPIRATION RATE: 16 BRPM | OXYGEN SATURATION: 99 % | HEIGHT: 61 IN | WEIGHT: 120 LBS | HEART RATE: 74 BPM | SYSTOLIC BLOOD PRESSURE: 124 MMHG | DIASTOLIC BLOOD PRESSURE: 64 MMHG | TEMPERATURE: 98 F

## 2024-02-12 DIAGNOSIS — S22.31XD CLOSED FRACTURE OF ONE RIB OF RIGHT SIDE WITH ROUTINE HEALING, SUBSEQUENT ENCOUNTER: Primary | ICD-10-CM

## 2024-02-12 DIAGNOSIS — Z12.11 COLON CANCER SCREENING: ICD-10-CM

## 2024-02-12 PROCEDURE — 99213 OFFICE O/P EST LOW 20 MIN: CPT | Performed by: FAMILY MEDICINE

## 2024-02-12 RX ORDER — ESTRADIOL 10 UG/1
INSERT VAGINAL
COMMUNITY
Start: 2024-02-08

## 2024-02-12 RX ORDER — LOSARTAN POTASSIUM 50 MG/1
50 TABLET ORAL 2 TIMES DAILY
Qty: 180 TABLET | Refills: 0 | Status: SHIPPED | OUTPATIENT
Start: 2024-02-12

## 2024-02-12 RX ORDER — KETOROLAC TROMETHAMINE 5 MG/ML
SOLUTION OPHTHALMIC
COMMUNITY
Start: 2024-01-31

## 2024-02-12 NOTE — PROGRESS NOTES
Note to patient: The 21st Century Cures Act makes medical notes like these available to patients in the interest of transparency. However, be advised this is a medical document. It is intended as peer to peer communication. It is written in medical language and may contain abbreviations or verbiage that are unfamiliar. It may appear blunt or direct. Medical documents are intended to carry relevant information, facts as evident, and the clinical opinion of the practitioner.         Chief Complaint   Patient presents with    Follow - Up     Fractured ribs right - [facility] follow up        HPI:    Patient was diagnosed with rib fracture on 2/4/24. She tripped an fell on the lawnmower seat. She reports pain with coughing. She is taking ibuprofen for pain and norco. She was using it alternating every 6 hours initially. She stopped the norco couple days ago and reducing her ibuprofen to BID. 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       XR RIBS WITH CHEST (3 VIEWS), RIGHT  (CPT=71101)    Result Date: 2/4/2024  CONCLUSION:  Positive for fracture involving the right 10th rib  LOCATION:  [facility]     Dictated by (CST): Fabian Griffin MD on 2/04/2024 at 1:46 PM     Finalized by (CST): Fabian Griffin MD on 2/04/2024 at 1:49 PM       XR ABDOMEN OBSTRUCTIVE SERIES ROUTINE(2 VW)(CPT=74019)    Result Date: 1/4/2024  CONCLUSION:  See above.   LOCATION:  [facility]    Dictated by (CST): Tomi Fiore MD on 1/04/2024 at 1:46 PM     Finalized by (CST): Tomi Fiore MD on 1/04/2024 at 1:47 PM       JANETT CARLEE 2D+3D SCREENING BILAT (CPT=77067/66568)    Result Date: 12/8/2023  CONCLUSION:  No mammographic evidence of malignancy.  BI-RADS CATEGORY:  DIAGNOSTIC CATEGORY 2--BENIGN FINDING NO CHANGE FROM COMPARISON ASSESSMENT.   RECOMMENDATIONS:  ROUTINE MAMMOGRAM AND CLINICAL EVALUATION IN 12 MONTHS.   Because of breast density, this patient may benefit from supplemental screening with breast MRI,  Molecular Breast Imaging (MBI) or bilateral whole breast ultrasound for increased sensitivity for detection of cancer which can be obscured mammographically.   Please contact your ordering provider to discuss supplemental screening options.    A letter explaining the results in lay terms has been sent to the patient.  This exam was evaluated with a computer-aided device.  This patient's information has been entered into a reminder system with a target due date for the next mammogram.   LOCATION:  [facility]   Dictated by (CST): Kieran Dee MD on 2023 at 12:29 PM     Finalized by (CST): Kieran Dee MD on 2023 at 12:34 PM        Review of Systems   Constitutional: Negative for fever, chills and fatigue. No distress.  Respiratory: Negative for cough, chest tightness, shortness of breath and wheezing.    Cardiovascular: Negative for chest pain, palpitations and leg swelling.   Gastrointestinal: Negative for nausea, vomiting    Patient Active Problem List   Diagnosis    Allergic rhinitis due to pollen    Arthralgia of temporomandibular joint    Chronic rhinitis    Gastroesophageal reflux disease    Hypothyroidism    Postmenopausal    Major depressive disorder, recurrent episode, moderate with anxious distress (HCC)    Stress at home    Moderate persistent asthma without complication    Hypocholesterolemia    Impaired fasting glucose    Renal insufficiency    Family history of chronic kidney disease    Unintentional weight loss    History of hysterectomy    Fatty liver    Heartburn       Past Medical History:   Diagnosis Date    Allergic rhinitis     Anxiety     Arthritis     Asthma Child mendoza    Depression     Esophageal reflux     Fatty liver 2023 ultrasound of abdomen    Hyperlipidemia     Hyperthyroidism     Renal insufficiency 2023     Past Surgical History:   Procedure Laterality Date    COLONOSCOPY      HYSTERECTOMY      -approximate          OTHER SURGICAL HISTORY       Eye surgery    TUBAL LIGATION       Family History   Problem Relation Age of Onset    Diabetes Father     Stroke Father     Depression Father     Obesity Sister      Social History     Socioeconomic History    Marital status:    Tobacco Use    Smoking status: Never     Passive exposure: Never    Smokeless tobacco: Never   Vaping Use    Vaping Use: Never used   Substance and Sexual Activity    Alcohol use: Yes     Alcohol/week: 3.0 - 4.0 standard drinks of alcohol     Types: 3 - 4 Standard drinks or equivalent per week     Comment: occ    Drug use: Never   Other Topics Concern    Caffeine Concern No    Exercise No    Seat Belt Yes    Special Diet No    Stress Concern Yes    Weight Concern No     Service No    Blood Transfusions No    Occupational Exposure No    Hobby Hazards No    Sleep Concern No    Back Care No    Bike Helmet No    Self-Exams No       Current Outpatient Medications   Medication Sig Dispense Refill    losartan 50 MG Oral Tab TAKE 1 TABLET(50 MG) BY MOUTH TWICE DAILY 180 tablet 0    ketorolac 0.5 % Ophthalmic Solution       Estradiol 10 MCG Vaginal Tab Place vaginally twice a week.      SYMBICORT 80-4.5 MCG/ACT Inhalation Aerosol Inhale 2 puffs into the lungs 2 (two) times daily.      ofloxacin 0.3 % Ophthalmic Solution       XIIDRA 5 % Ophthalmic Solution Place 1 drop into both eyes 2 (two) times daily.      pantoprazole 40 MG Oral Tab EC Take 1 tablet (40 mg total) by mouth every morning before breakfast. 90 tablet 0    fluticasone propionate 50 MCG/ACT Nasal Suspension 1 spray in each nostril Nasally Once a day as needed      levothyroxine 75 MCG Oral Tab Take 1 tablet (75 mcg total) by mouth before breakfast. 90 tablet 1    buPROPion  MG Oral Tablet 24 Hr Take 1 tablet (300 mg total) by mouth daily. 90 tablet 1    ALPRAZolam 0.5 MG Oral Tab Take 1 tablet (0.5 mg total) by mouth daily as needed for Anxiety. 30 tablet 1    montelukast 10 MG Oral Tab Take 1 tablet (10 mg total)  by mouth nightly. 90 tablet 1    traZODone 150 MG Oral Tab Take 1 tablet (150 mg total) by mouth nightly as needed for Sleep. 90 tablet 1    CRANBERRY OR Take 15 mg by mouth daily.      loratadine 10 MG Oral Tab Take 1 tablet (10 mg total) by mouth daily as needed for Allergies.      Lifitegrast (XIIDRA OP) Apply to eye 2 (two) times a day.      albuterol 108 (90 Base) MCG/ACT Inhalation Aero Soln Inhale 2 puffs into the lungs every 4 (four) hours as needed.      valACYclovir 1 G Oral Tab Take 1 tablet (1,000 mg total) by mouth as needed. 2x 12      Ascorbic Acid (VITAMIN C) 100 MG Oral Tab Take 1 tablet (100 mg total) by mouth daily.      zinc sulfate 220 (50 Zn) MG Oral Cap Take 25 mg by mouth daily.      Multiple Vitamins-Minerals (MULTI-VITAMIN/MINERALS) Oral Tab Take 1 tablet by mouth daily.         Allergies  Allergies   Allergen Reactions    Epinephrine OTHER (SEE COMMENTS)    Cats Claw, Uncaria Tom[place]osa RASH    Dander RASH     Dogs      Dust Mites RASH    Grass RASH    Mold RASH       Health Maintenance  Immunizations:  Immunization History   Administered Date(s) Administered    Covid-19 Vaccine Pfizer 30 mcg/0.3 ml 04/25/2021    Covid-19 Vaccine Pfizer Bivalent 30mcg/0.3mL 04/03/2021, 04/25/2021    Fluarix 6 Months And Older 0.5 ml prefilled syringe (33351) 10/31/2022    Flucelvax 0.5ml Vaccine 10/12/2023    Influenza 10/01/2019    Pneumococcal (Prevnar 13) 10/01/2019    TDAP 06/21/2023         Physical Exam  /64   Pulse 74   Temp 98 °F (36.7 °C) (Temporal)   Resp 16   Ht 5' 1\" (1.549 m)   Wt 120 lb (54.4 kg)   SpO2 99%   BMI 22.67 kg/m²   Constitutional: Oriented to person, place, and time. No distress.   HEENT:  Normocephalic and atraumatic.   Cardiovascular: Normal rate, regular rhythm and intact distal pulses.  No murmur, rubs or gallops.   Pulmonary/Chest: Effort normal and breath sounds normal. No respiratory distress. No wheezes, rhonchi or rales  Abdominal: Soft. Bowel sounds are  normal. Non tender, no masses, no organomegaly or hernias.  Right lower rib cage lateral aspect tenderness around rib 10. No deformity palpable. There is no bruising or swelling.     A/P:    Encounter Diagnoses   Name Primary?    Colon cancer screening Yes    Closed fracture of one rib of right side with routine healing, subsequent encounter      1. Colon cancer screening  Patient declined colonoscopy.   She will be getting the cologuard done.     2. Closed fracture of one rib of right side with routine healing, subsequent encounter  Nondisplaced rib fracture   Take the ibuprofen and norco as prescribed when the pain becomes less severe she may  take tylenol, apply ice (wrapped) to the area 10-20 minutes TID, advise to do deep breathing and gentle coughing every 2 hours to keep the lungs open, would also recommend spirometer use, no heavy lifting for 4 more weeks   Healing time: 6 weeks   Notify if SOB, coughing, worsening pain, swelling           No orders of the defined types were placed in this encounter.      Meds & Refills for this Visit:  Requested Prescriptions      No prescriptions requested or ordered in this encounter       Imaging & Consults:  None      No follow-ups on file.    There are no Patient Instructions on file for this visit.    All questions were answered and the patient understands the plan.     Tung Do, DO        This note was prepared using Dragon Medical voice recognition dictation software. As a result errors may occur. When identified these errors have been corrected. While every attempt is made to correct errors during dictation discrepancies may still exist.      Note to patient: The 21st Century Cures Act makes medical notes like these available to patients in the interest of transparency. However, be advised this is a medical document. It is intended as peer to peer communication. It is written in medical language and may contain abbreviations or verbiage that are unfamiliar.  It may appear blunt or direct. Medical documents are intended to carry relevant information, facts as evident, and the clinical opinion of the practitioner.

## 2024-02-15 DIAGNOSIS — F33.1 MAJOR DEPRESSIVE DISORDER, RECURRENT EPISODE, MODERATE WITH ANXIOUS DISTRESS (HCC): ICD-10-CM

## 2024-02-15 DIAGNOSIS — F43.9 STRESS AT HOME: ICD-10-CM

## 2024-02-15 DIAGNOSIS — Z51.81 THERAPEUTIC DRUG MONITORING: ICD-10-CM

## 2024-02-15 RX ORDER — TRAZODONE HYDROCHLORIDE 150 MG/1
150 TABLET ORAL NIGHTLY
Qty: 90 TABLET | Refills: 1 | OUTPATIENT
Start: 2024-02-15

## 2024-02-15 NOTE — TELEPHONE ENCOUNTER
Requested Prescriptions     Pending Prescriptions Disp Refills    TRAZODONE 150 MG Oral Tab [Pharmacy Med Name: TRAZODONE 150MG (HUNDRED-FIFTY) TAB] 90 tablet 1     Sig: TAKE 1 TABLET(150 MG) BY MOUTH EVERY NIGHT     Last refill: 8/17/23 90 tabs 1 refill    Last Appointment: 8/17/23    Next Appointment: No future OV's scheduled  Pt. Cancelled appt. For tomorrow and did not reschedule.     Note from Pharmacy: **Patient requests 90 days supply**ZERO refills remain on this prescription. Your patient is requesting advance approval of refills for this medication to PREVENT ANY MISSED DOSES.

## 2024-02-19 ENCOUNTER — TELEPHONE (OUTPATIENT)
Facility: CLINIC | Age: 59
End: 2024-02-19

## 2024-02-19 NOTE — TELEPHONE ENCOUNTER
Spoke to patient    Let her know that the lab that is order (complete metabolic panel) includes liver enzymes.    Pt verbalized understanding and had no further questions

## 2024-02-19 NOTE — TELEPHONE ENCOUNTER
Pt states that she was told to take pantoprazole for 1 month and have labs done after that.  Pt would like orders entered to include liver testing. Please call.

## 2024-02-27 ENCOUNTER — TELEPHONE (OUTPATIENT)
Dept: FAMILY MEDICINE CLINIC | Facility: CLINIC | Age: 59
End: 2024-02-27

## 2024-02-27 ENCOUNTER — OFFICE VISIT (OUTPATIENT)
Dept: FAMILY MEDICINE CLINIC | Facility: CLINIC | Age: 59
End: 2024-02-27
Payer: MEDICAID

## 2024-02-27 ENCOUNTER — LAB ENCOUNTER (OUTPATIENT)
Dept: LAB | Age: 59
End: 2024-02-27
Payer: MEDICAID

## 2024-02-27 VITALS
WEIGHT: 118 LBS | DIASTOLIC BLOOD PRESSURE: 72 MMHG | HEIGHT: 61 IN | HEART RATE: 88 BPM | SYSTOLIC BLOOD PRESSURE: 112 MMHG | BODY MASS INDEX: 22.28 KG/M2 | RESPIRATION RATE: 16 BRPM

## 2024-02-27 DIAGNOSIS — I10 ESSENTIAL HYPERTENSION: ICD-10-CM

## 2024-02-27 DIAGNOSIS — Z00.00 WELL ADULT EXAM: ICD-10-CM

## 2024-02-27 DIAGNOSIS — K76.0 FATTY LIVER: ICD-10-CM

## 2024-02-27 DIAGNOSIS — Z00.00 WELL ADULT EXAM: Primary | ICD-10-CM

## 2024-02-27 DIAGNOSIS — K21.9 GASTROESOPHAGEAL REFLUX DISEASE WITHOUT ESOPHAGITIS: ICD-10-CM

## 2024-02-27 DIAGNOSIS — E03.9 HYPOTHYROIDISM, UNSPECIFIED TYPE: ICD-10-CM

## 2024-02-27 DIAGNOSIS — J45.40 MODERATE PERSISTENT ASTHMA WITHOUT COMPLICATION (HCC): ICD-10-CM

## 2024-02-27 DIAGNOSIS — S22.31XD CLOSED FRACTURE OF ONE RIB OF RIGHT SIDE WITH ROUTINE HEALING, SUBSEQUENT ENCOUNTER: Primary | ICD-10-CM

## 2024-02-27 DIAGNOSIS — E78.6 HYPOCHOLESTEROLEMIA: ICD-10-CM

## 2024-02-27 DIAGNOSIS — D64.9 ANEMIA, UNSPECIFIED TYPE: ICD-10-CM

## 2024-02-27 DIAGNOSIS — Z90.710 HISTORY OF HYSTERECTOMY: ICD-10-CM

## 2024-02-27 DIAGNOSIS — R79.89 ELEVATED SERUM CREATININE: ICD-10-CM

## 2024-02-27 DIAGNOSIS — F33.1 MAJOR DEPRESSIVE DISORDER, RECURRENT EPISODE, MODERATE WITH ANXIOUS DISTRESS (HCC): ICD-10-CM

## 2024-02-27 LAB
BASOPHILS # BLD AUTO: 0.06 X10(3) UL (ref 0–0.2)
BASOPHILS NFR BLD AUTO: 0.9 %
EOSINOPHIL # BLD AUTO: 0.16 X10(3) UL (ref 0–0.7)
EOSINOPHIL NFR BLD AUTO: 2.3 %
ERYTHROCYTE [DISTWIDTH] IN BLOOD BY AUTOMATED COUNT: 13.3 %
HCT VFR BLD AUTO: 33.8 %
HGB BLD-MCNC: 11 G/DL
IMM GRANULOCYTES # BLD AUTO: 0.02 X10(3) UL (ref 0–1)
IMM GRANULOCYTES NFR BLD: 0.3 %
LYMPHOCYTES # BLD AUTO: 1.16 X10(3) UL (ref 1–4)
LYMPHOCYTES NFR BLD AUTO: 17 %
MCH RBC QN AUTO: 29.5 PG (ref 26–34)
MCHC RBC AUTO-ENTMCNC: 32.5 G/DL (ref 31–37)
MCV RBC AUTO: 90.6 FL
MONOCYTES # BLD AUTO: 0.46 X10(3) UL (ref 0.1–1)
MONOCYTES NFR BLD AUTO: 6.7 %
NEUTROPHILS # BLD AUTO: 4.96 X10 (3) UL (ref 1.5–7.7)
NEUTROPHILS # BLD AUTO: 4.96 X10(3) UL (ref 1.5–7.7)
NEUTROPHILS NFR BLD AUTO: 72.8 %
PLATELET # BLD AUTO: 253 10(3)UL (ref 150–450)
RBC # BLD AUTO: 3.73 X10(6)UL
WBC # BLD AUTO: 6.8 X10(3) UL (ref 4–11)

## 2024-02-27 PROCEDURE — 99213 OFFICE O/P EST LOW 20 MIN: CPT | Performed by: FAMILY MEDICINE

## 2024-02-27 PROCEDURE — 87491 CHLMYD TRACH DNA AMP PROBE: CPT

## 2024-02-27 PROCEDURE — 83540 ASSAY OF IRON: CPT

## 2024-02-27 PROCEDURE — 86780 TREPONEMA PALLIDUM: CPT

## 2024-02-27 PROCEDURE — 84443 ASSAY THYROID STIM HORMONE: CPT

## 2024-02-27 PROCEDURE — 82728 ASSAY OF FERRITIN: CPT

## 2024-02-27 PROCEDURE — 80053 COMPREHEN METABOLIC PANEL: CPT

## 2024-02-27 PROCEDURE — 87591 N.GONORRHOEAE DNA AMP PROB: CPT

## 2024-02-27 PROCEDURE — 80061 LIPID PANEL: CPT

## 2024-02-27 PROCEDURE — 99396 PREV VISIT EST AGE 40-64: CPT | Performed by: FAMILY MEDICINE

## 2024-02-27 PROCEDURE — 83550 IRON BINDING TEST: CPT

## 2024-02-27 PROCEDURE — 87389 HIV-1 AG W/HIV-1&-2 AB AG IA: CPT

## 2024-02-27 PROCEDURE — 36415 COLL VENOUS BLD VENIPUNCTURE: CPT

## 2024-02-27 PROCEDURE — 85025 COMPLETE CBC W/AUTO DIFF WBC: CPT

## 2024-02-27 RX ORDER — MELATONIN
1000 DAILY
COMMUNITY

## 2024-02-27 RX ORDER — CETIRIZINE HYDROCHLORIDE 10 MG/1
10 TABLET ORAL DAILY
COMMUNITY

## 2024-02-27 NOTE — PROGRESS NOTES
Note to patient: The 21st Century Cures Act makes medical notes like these available to patients in the interest of transparency. However, be advised this is a medical document. It is intended as peer to peer communication. It is written in medical language and may contain abbreviations or verbiage that are unfamiliar. It may appear blunt or direct. Medical documents are intended to carry relevant information, facts as evident, and the clinical opinion of the practitioner.         Chief Complaint   Patient presents with    Physical    Follow - Up     Rib pain        HPI:  Pt here for annual physical, last seen in September to establish care after move from [place].     Broke ribs earlier this year -- she had 10th rib broken.     Hx of hypothyroidism. Continues levothryoxine and is stable at 75mcg dosing.    Hx of depression, taking Buproprion 300 mg daily. Reports symptoms have been stable. Denies any side effects.  Anxiety is well controlled at this time.      Also needs estradiol refill - tabs for vaginal dryness. She has been using this for about a year now. She also uses estrogen cream for bladder issues, sees urology.     History of a hysterectomy with oophorectomy in 2015. Previously diagnosed with HPV. Hysterectomy was 2/2 menorrhagia. Hx of precancerous cells in cervix perhaps in her 20s.      Follows with GI - PPI started through GI and is working very well. Patient reports symptoms well controlled with no breakthrough symptoms.     Pt will get cologuard through GI this year.     Hx of asthma - symbicort PRN but has not been using any inhalers recently. Denies symptoms.     Hx of IBS - intermittent constipation and diarrhea.       PMHx: HTN, GERD, depression, asthma, hypothyroidism,   PSHx: hysterectomy w/oophorectomy, rotator cuff surgery  FMHx:  -maternal: none  -paternal: dad has DM. TIAs. HTN, GERD  Smoking: none  Alcohol: weekends, socially (3-4 per weekend)   Drugs: none   Sexual hx: 1 partner   STD hx:  yes   Occupation: [employer]  part time  Mammogram: UTD completed Dec 2023, US upcoming  Colonoscopy: cologuard this year, GI following  Pap smear: appox 2 yrs ago  Tdap: UTD  Diet: snacks over meals, appetite waxes and wanes. Weight has been stable.  Exercise: none      Review of Systems   Constitutional: Negative for fever, chills and fatigue. No distress.  HENT: Negative for hearing loss, congestion, sore throat, neck pain   Eyes: Negative for pain and visual disturbance.   Respiratory: Negative for cough, chest tightness, shortness of breath and wheezing.    Cardiovascular: Negative for chest pain, palpitations and leg swelling.   Gastrointestinal: Negative for nausea, vomiting, abdominal pain, diarrhea, blood in stool and abdominal distention.   Genitourinary: Negative for dysuria, hematuria and difficulty urinating.   Musculoskeletal: Negative for myalgias, back pain, joint swelling, arthralgias and gait problem.   Skin: Negative for color change and rash.    Psychiatric/Behavioral: The patient is not nervous/anxious. No depression.    Patient Active Problem List   Diagnosis    Allergic rhinitis due to pollen    Arthralgia of temporomandibular joint    Chronic rhinitis    Gastroesophageal reflux disease    Hypothyroidism    Postmenopausal    Major depressive disorder, recurrent episode, moderate with anxious distress (HCC)    Stress at home    Moderate persistent asthma without complication (HCC)    Hypocholesterolemia    Impaired fasting glucose    Renal insufficiency    Family history of chronic kidney disease    Unintentional weight loss    History of hysterectomy    Fatty liver    Heartburn       Past Medical History:   Diagnosis Date    Allergic rhinitis     Anxiety     Arthritis     Asthma (HCC) Child mendoza    Depression     Esophageal reflux     Fatty liver 08/03/2023 7/27/2023 ultrasound of abdomen    Hyperlipidemia     Hyperthyroidism     Renal insufficiency 03/06/2023     Past Surgical  History:   Procedure Laterality Date    COLONOSCOPY      HYSTERECTOMY      -          OTHER SURGICAL HISTORY      Eye surgery    TUBAL LIGATION       Family History   Problem Relation Age of Onset    Diabetes Father     Stroke Father     Depression Father     Obesity Sister      Social History     Socioeconomic History    Marital status:    Tobacco Use    Smoking status: Never     Passive exposure: Never    Smokeless tobacco: Never   Vaping Use    Vaping Use: Never used   Substance and Sexual Activity    Alcohol use: Yes     Alcohol/week: 3.0 - 4.0 standard drinks of alcohol     Types: 3 - 4 Standard drinks or equivalent per week     Comment: occ    Drug use: Never   Other Topics Concern    Caffeine Concern No    Exercise No    Seat Belt Yes    Special Diet No    Stress Concern Yes    Weight Concern No     Service No    Blood Transfusions No    Occupational Exposure No    Hobby Hazards No    Sleep Concern No    Back Care No    Bike Helmet No    Self-Exams No       Current Outpatient Medications   Medication Sig Dispense Refill    Cranberry 475 MG Oral Cap Take by mouth.      cetirizine 10 MG Oral Tab Take 1 tablet (10 mg total) by mouth daily.      cholecalciferol 25 MCG (1000 UT) Oral Tab Take 1 tablet (1,000 Units total) by mouth daily.      losartan 50 MG Oral Tab TAKE 1 TABLET(50 MG) BY MOUTH TWICE DAILY 180 tablet 0    ketorolac 0.5 % Ophthalmic Solution       Estradiol 10 MCG Vaginal Tab Place vaginally twice a week.      SYMBICORT 80-4.5 MCG/ACT Inhalation Aerosol Inhale 2 puffs into the lungs 2 (two) times daily.      pantoprazole 40 MG Oral Tab EC Take 1 tablet (40 mg total) by mouth every morning before breakfast. 90 tablet 0    fluticasone propionate 50 MCG/ACT Nasal Suspension 1 spray in each nostril Nasally Once a day as needed      levothyroxine 75 MCG Oral Tab Take 1 tablet (75 mcg total) by mouth before breakfast. 90 tablet 1    buPROPion  MG Oral Tablet 24 Hr  Take 1 tablet (300 mg total) by mouth daily. 90 tablet 1    ALPRAZolam 0.5 MG Oral Tab Take 1 tablet (0.5 mg total) by mouth daily as needed for Anxiety. 30 tablet 1    montelukast 10 MG Oral Tab Take 1 tablet (10 mg total) by mouth nightly. 90 tablet 1    traZODone 150 MG Oral Tab Take 1 tablet (150 mg total) by mouth nightly as needed for Sleep. 90 tablet 1    CRANBERRY OR Take 15 mg by mouth daily.      loratadine 10 MG Oral Tab Take 1 tablet (10 mg total) by mouth daily as needed for Allergies.      Lifitegrast (XIIDRA OP) Apply to eye 2 (two) times a day.      albuterol 108 (90 Base) MCG/ACT Inhalation Aero Soln Inhale 2 puffs into the lungs every 4 (four) hours as needed.      valACYclovir 1 G Oral Tab Take 1 tablet (1,000 mg total) by mouth as needed. 2x 12      Ascorbic Acid (VITAMIN C) 100 MG Oral Tab Take 1 tablet (100 mg total) by mouth in the morning and 1 tablet (100 mg total) before bedtime.      zinc sulfate 220 (50 Zn) MG Oral Cap Take 25 mg by mouth daily.      Multiple Vitamins-Minerals (MULTI-VITAMIN/MINERALS) Oral Tab Take 1 tablet by mouth daily.         Allergies  Allergies   Allergen Reactions    Epinephrine OTHER (SEE COMMENTS)    Cats Claw, Uncaria Tomentosa RASH    Dander RASH     Dogs      Dust Mites RASH    Grass RASH    Mold RASH       Health Maintenance  Immunizations:  Immunization History   Administered Date(s) Administered    Covid-19 Vaccine Pfizer 30 mcg/0.3 ml 04/25/2021    Covid-19 Vaccine Pfizer Bivalent 30mcg/0.3mL 04/03/2021, 04/25/2021    Fluarix 6 Months And Older 0.5 ml prefilled syringe (91041) 10/31/2022    Flucelvax 0.5ml Vaccine 10/12/2023    Influenza 10/01/2019    Pneumococcal (Prevnar 13) 10/01/2019    TDAP 06/21/2023         Physical Exam  /72   Pulse 88   Resp 16   Ht 5' 1\" (1.549 m)   Wt 118 lb   BMI 22.30 kg/m²   Constitutional: Oriented to person, place, and time. No distress.   HEENT:  Normocephalic and atraumatic. Hearing and tympanic membranes  normal.  Nose normal. Oropharynx is clear and moist.   Eyes: Conjunctivae and EOM are normal. PERRLA. No scleral icterus.   Neck:  No mass and no thyromegaly present.   Cardiovascular: Normal rate, regular rhythm and intact distal pulses.  No murmur, rubs or gallops.   Pulmonary/Chest: Effort normal and breath sounds normal. No respiratory distress. No wheezes, rhonchi or rales  Abdominal: Soft. Bowel sounds are normal. Non tender, no masses, no organomegaly or hernias.  Musculoskeletal: No edema and no tenderness.    Skin: Skin is warm and dry.  Psychiatric: Normal mood and affect.     A/P:    1. Well adult exam  Patient encouraged to continue healthy eating and incorporate physical activity when tolerated. She currently experiences some pain/tenderness with movement d/t rib fractures. Yearly lab work has been ordered in addition to STD testing as requested by patient. Mammography completed Dec 2023, colon cancer screening per GI.     - CBC With Differential With Platelet; Future  - Comp Metabolic Panel (14); Future  - Lipid Panel; Future  - TSH W Reflex To Free T4 [E]; Future  - HIV Ag/Ab Combo; Future  - Chlamydia/Gc Amplification; Future  - T Pallidum Screening [place]; Future    2. Major depressive disorder, recurrent episode, moderate with anxious distress (HCC)  Continue bupropion, stable with no recurrent symptoms. Tolerating well.     3. Hypocholesterolemia  Not currently on mediation. Will recheck levels this year and address based on lab values.     4. Hypothyroidism, unspecified type  Continue levothyroxine 75 mcg daily - stable per patient.  TSH to be drawn.     5. Gastroesophageal reflux disease without esophagitis  Follows with GI - continue pantoprazole. Symptoms improved significantly per patient.     6. Fatty liver  Will check LFT with CMP panel.     7. Moderate persistent asthma without complication (HCC)  No recurrence of symptoms per patient. Using inhalers PRN at this point.    8. Essential  hypertension  Blood pressure well controlled. Continue losartan 50mg twice daily.     9. History of hysterectomy  Patient with history of HPV per GYN in [place]. Will schedule for well woman exam this year and do vaginal exam d/t this history.     Encounter Diagnoses   Name Primary?    Well adult exam Yes    Major depressive disorder, recurrent episode, moderate with anxious distress (HCC)     Hypocholesterolemia     Hypothyroidism, unspecified type     Gastroesophageal reflux disease without esophagitis     Fatty liver     Moderate persistent asthma without complication (HCC)     Essential hypertension     History of hysterectomy        Orders Placed This Encounter   Procedures    CBC With Differential With Platelet    Comp Metabolic Panel (14)    Lipid Panel    TSH W Reflex To Free T4 [E]    HIV Ag/Ab Combo    T Pallidum Screening [place]    Chlamydia/Gc Amplification       Meds & Refills for this Visit:  Requested Prescriptions      No prescriptions requested or ordered in this encounter       Imaging & Consults:  None      No follow-ups on file.    There are no Patient Instructions on file for this visit.    All questions were answered and the patient understands the plan.     LINDA Rodrigues      I have personally seen and examined the patient, I have reviewed the PA student's examination, notes, and agree with the assessment and plan.        Tung Do,         This note was prepared using Dragon Medical voice recognition dictation software. As a result errors may occur. When identified these errors have been corrected. While every attempt is made to correct errors during dictation discrepancies may still exist.      Note to patient: The 21st Century Cures Act makes medical notes like these available to patients in the interest of transparency. However, be advised this is a medical document. It is intended as peer to peer communication. It is written in medical language and may contain  abbreviations or verbiage that are unfamiliar. It may appear blunt or direct. Medical documents are intended to carry relevant information, facts as evident, and the clinical opinion of the practitioner.

## 2024-02-27 NOTE — TELEPHONE ENCOUNTER
- Pt would like to know how long she should be off work?  Do you want pt to stay off work until getting results of xray of rib scheduled 03/21/2024.    Please advise PH. [phone]

## 2024-02-28 DIAGNOSIS — D64.9 ANEMIA, UNSPECIFIED TYPE: Primary | ICD-10-CM

## 2024-02-28 LAB
ALBUMIN SERPL-MCNC: 3.6 G/DL (ref 3.4–5)
ALBUMIN/GLOB SERPL: 1.1 {RATIO} (ref 1–2)
ALP LIVER SERPL-CCNC: 71 U/L
ALT SERPL-CCNC: 12 U/L
ANION GAP SERPL CALC-SCNC: 4 MMOL/L (ref 0–18)
AST SERPL-CCNC: 10 U/L (ref 15–37)
BILIRUB SERPL-MCNC: 0.4 MG/DL (ref 0.1–2)
BUN BLD-MCNC: 13 MG/DL (ref 9–23)
C TRACH DNA SPEC QL NAA+PROBE: NEGATIVE
CALCIUM BLD-MCNC: 9.3 MG/DL (ref 8.5–10.1)
CHLORIDE SERPL-SCNC: 107 MMOL/L (ref 98–112)
CHOLEST SERPL-MCNC: 214 MG/DL (ref ?–200)
CO2 SERPL-SCNC: 29 MMOL/L (ref 21–32)
CREAT BLD-MCNC: 0.96 MG/DL
DEPRECATED HBV CORE AB SER IA-ACNC: 192.9 NG/ML
EGFRCR SERPLBLD CKD-EPI 2021: 68 ML/MIN/1.73M2 (ref 60–?)
FASTING PATIENT LIPID ANSWER: NO
FASTING STATUS PATIENT QL REPORTED: NO
GLOBULIN PLAS-MCNC: 3.2 G/DL (ref 2.8–4.4)
GLUCOSE BLD-MCNC: 108 MG/DL (ref 70–99)
HDLC SERPL-MCNC: 81 MG/DL (ref 40–59)
IRON SATN MFR SERPL: 37 %
IRON SERPL-MCNC: 87 UG/DL
LDLC SERPL CALC-MCNC: 118 MG/DL (ref ?–100)
N GONORRHOEA DNA SPEC QL NAA+PROBE: NEGATIVE
NONHDLC SERPL-MCNC: 133 MG/DL (ref ?–130)
OSMOLALITY SERPL CALC.SUM OF ELEC: 291 MOSM/KG (ref 275–295)
POTASSIUM SERPL-SCNC: 4 MMOL/L (ref 3.5–5.1)
PROT SERPL-MCNC: 6.8 G/DL (ref 6.4–8.2)
SODIUM SERPL-SCNC: 140 MMOL/L (ref 136–145)
T PALLIDUM AB SER QL IA: NONREACTIVE
TIBC SERPL-MCNC: 238 UG/DL (ref 240–450)
TRANSFERRIN SERPL-MCNC: 160 MG/DL (ref 200–360)
TRIGL SERPL-MCNC: 85 MG/DL (ref 30–149)
TSI SER-ACNC: 0.57 MIU/ML (ref 0.36–3.74)
VLDLC SERPL CALC-MCNC: 15 MG/DL (ref 0–30)

## 2024-02-28 NOTE — TELEPHONE ENCOUNTER
I would like the xray ordered for when she follows up prior to returning to work, this would be two weeks from now. So an xray right now is not needed.

## 2024-02-29 DIAGNOSIS — E03.9 ACQUIRED HYPOTHYROIDISM: ICD-10-CM

## 2024-03-01 RX ORDER — LEVOTHYROXINE SODIUM 0.07 MG/1
75 TABLET ORAL
Qty: 90 TABLET | Refills: 1 | Status: SHIPPED | OUTPATIENT
Start: 2024-03-01

## 2024-03-03 ENCOUNTER — PATIENT MESSAGE (OUTPATIENT)
Dept: FAMILY MEDICINE CLINIC | Facility: CLINIC | Age: 59
End: 2024-03-03

## 2024-03-03 DIAGNOSIS — F33.1 MAJOR DEPRESSIVE DISORDER, RECURRENT EPISODE, MODERATE WITH ANXIOUS DISTRESS (HCC): ICD-10-CM

## 2024-03-03 DIAGNOSIS — Z51.81 THERAPEUTIC DRUG MONITORING: ICD-10-CM

## 2024-03-03 DIAGNOSIS — F43.9 STRESS AT HOME: ICD-10-CM

## 2024-03-04 ENCOUNTER — HOSPITAL ENCOUNTER (OUTPATIENT)
Dept: ULTRASOUND IMAGING | Age: 59
Discharge: HOME | End: 2024-03-04
Attending: FAMILY MEDICINE
Payer: MEDICAID

## 2024-03-04 ENCOUNTER — TELEPHONE (OUTPATIENT)
Facility: CLINIC | Age: 59
End: 2024-03-04

## 2024-03-04 DIAGNOSIS — R92.30 DENSE BREASTS: ICD-10-CM

## 2024-03-04 DIAGNOSIS — Z12.11 COLON CANCER SCREENING: Primary | ICD-10-CM

## 2024-03-04 PROCEDURE — 76641 ULTRASOUND BREAST COMPLETE: CPT | Performed by: FAMILY MEDICINE

## 2024-03-04 RX ORDER — ALPRAZOLAM 0.5 MG/1
0.5 TABLET ORAL
Qty: 30 TABLET | Refills: 0 | OUTPATIENT
Start: 2024-03-04

## 2024-03-04 NOTE — TELEPHONE ENCOUNTER
Patient is due for cologuard for CRC screen. Order placed. Please arrange. Thank you.     BRENT Zapata

## 2024-03-04 NOTE — TELEPHONE ENCOUNTER
Requested Prescriptions     Refused Prescriptions Disp Refills    ALPRAZOLAM 0.5 MG Oral Tab [Pharmacy Med Name: ALPRAZOLAM 0.5MG TABLETS] 30 tablet 0     Sig: TAKE 1 TABLET(0.5 MG) BY MOUTH DAILY AS NEEDED FOR ANXIETY     Refused By: MOSES RODRIGUEZ     Reason for Refusal: Patient no longer under prescriber care     Pt. Has a new PCP

## 2024-03-04 NOTE — TELEPHONE ENCOUNTER
From: Julieta Goff  To: Pengsarah bethnando Do  Sent: 3/3/2024 8:43 PM CST  Subject: HIV test    I had an std screening but did not get hiv test. If I did I did not get results. I [facility] 9:00 am [facility]. Can the doctor send over that order so I can have it done at the same time please. Thank you   Handout given with dietary changes, advised decreased milk intake, miralax PRN. Schedule follow-up to fully discuss.

## 2024-03-05 DIAGNOSIS — D64.9 ANEMIA, UNSPECIFIED TYPE: Primary | ICD-10-CM

## 2024-03-19 ENCOUNTER — HOSPITAL ENCOUNTER (OUTPATIENT)
Dept: GENERAL RADIOLOGY | Age: 59
Discharge: HOME | End: 2024-03-19
Attending: FAMILY MEDICINE
Payer: MEDICAID

## 2024-03-19 ENCOUNTER — LAB ENCOUNTER (OUTPATIENT)
Dept: LAB | Age: 59
End: 2024-03-19
Attending: FAMILY MEDICINE
Payer: MEDICAID

## 2024-03-19 DIAGNOSIS — S22.31XD CLOSED FRACTURE OF ONE RIB OF RIGHT SIDE WITH ROUTINE HEALING, SUBSEQUENT ENCOUNTER: ICD-10-CM

## 2024-03-19 DIAGNOSIS — D64.9 ANEMIA, UNSPECIFIED TYPE: ICD-10-CM

## 2024-03-19 LAB
BASOPHILS # BLD AUTO: 0.09 X10(3) UL (ref 0–0.2)
BASOPHILS NFR BLD AUTO: 1.1 %
EOSINOPHIL # BLD AUTO: 0.17 X10(3) UL (ref 0–0.7)
EOSINOPHIL NFR BLD AUTO: 2 %
ERYTHROCYTE [DISTWIDTH] IN BLOOD BY AUTOMATED COUNT: 13.5 %
HCT VFR BLD AUTO: 37.4 %
HGB BLD-MCNC: 12.2 G/DL
IMM GRANULOCYTES # BLD AUTO: 0.03 X10(3) UL (ref 0–1)
IMM GRANULOCYTES NFR BLD: 0.4 %
LYMPHOCYTES # BLD AUTO: 1.76 X10(3) UL (ref 1–4)
LYMPHOCYTES NFR BLD AUTO: 21.1 %
MCH RBC QN AUTO: 29.8 PG (ref 26–34)
MCHC RBC AUTO-ENTMCNC: 32.6 G/DL (ref 31–37)
MCV RBC AUTO: 91.4 FL
MONOCYTES # BLD AUTO: 0.48 X10(3) UL (ref 0.1–1)
MONOCYTES NFR BLD AUTO: 5.7 %
NEUTROPHILS # BLD AUTO: 5.82 X10 (3) UL (ref 1.5–7.7)
NEUTROPHILS # BLD AUTO: 5.82 X10(3) UL (ref 1.5–7.7)
NEUTROPHILS NFR BLD AUTO: 69.7 %
PLATELET # BLD AUTO: 294 10(3)UL (ref 150–450)
RBC # BLD AUTO: 4.09 X10(6)UL
WBC # BLD AUTO: 8.4 X10(3) UL (ref 4–11)

## 2024-03-19 PROCEDURE — 71101 X-RAY EXAM UNILAT RIBS/CHEST: CPT | Performed by: FAMILY MEDICINE

## 2024-03-19 PROCEDURE — 36415 COLL VENOUS BLD VENIPUNCTURE: CPT

## 2024-03-19 PROCEDURE — 85025 COMPLETE CBC W/AUTO DIFF WBC: CPT

## 2024-03-21 ENCOUNTER — OFFICE VISIT (OUTPATIENT)
Dept: FAMILY MEDICINE CLINIC | Facility: CLINIC | Age: 59
End: 2024-03-21
Payer: MEDICAID

## 2024-03-21 ENCOUNTER — LAB ENCOUNTER (OUTPATIENT)
Dept: LAB | Age: 59
End: 2024-03-21
Attending: FAMILY MEDICINE
Payer: MEDICAID

## 2024-03-21 VITALS
BODY MASS INDEX: 22.09 KG/M2 | RESPIRATION RATE: 16 BRPM | DIASTOLIC BLOOD PRESSURE: 64 MMHG | WEIGHT: 117 LBS | TEMPERATURE: 97 F | OXYGEN SATURATION: 98 % | SYSTOLIC BLOOD PRESSURE: 108 MMHG | HEIGHT: 61 IN | HEART RATE: 80 BPM

## 2024-03-21 DIAGNOSIS — S22.41XG CLOSED FRACTURE OF MULTIPLE RIBS OF RIGHT SIDE WITH DELAYED HEALING, SUBSEQUENT ENCOUNTER: ICD-10-CM

## 2024-03-21 DIAGNOSIS — Z12.4 SCREENING FOR CERVICAL CANCER: ICD-10-CM

## 2024-03-21 DIAGNOSIS — Z86.19 HISTORY OF HPV INFECTION: ICD-10-CM

## 2024-03-21 DIAGNOSIS — Z12.4 CERVICAL CANCER SCREENING: ICD-10-CM

## 2024-03-21 DIAGNOSIS — Z01.419 WELL WOMAN EXAM WITH ROUTINE GYNECOLOGICAL EXAM: Primary | ICD-10-CM

## 2024-03-21 LAB
PTH-INTACT SERPL-MCNC: 60 PG/ML (ref 18.5–88)
VIT D+METAB SERPL-MCNC: 65.2 NG/ML (ref 30–100)

## 2024-03-21 PROCEDURE — 82330 ASSAY OF CALCIUM: CPT

## 2024-03-21 PROCEDURE — 88175 CYTOPATH C/V AUTO FLUID REDO: CPT | Performed by: FAMILY MEDICINE

## 2024-03-21 PROCEDURE — 36415 COLL VENOUS BLD VENIPUNCTURE: CPT

## 2024-03-21 PROCEDURE — 87624 HPV HI-RISK TYP POOLED RSLT: CPT | Performed by: FAMILY MEDICINE

## 2024-03-21 PROCEDURE — 87625 HPV TYPES 16 & 18 ONLY: CPT | Performed by: FAMILY MEDICINE

## 2024-03-21 PROCEDURE — 99396 PREV VISIT EST AGE 40-64: CPT | Performed by: FAMILY MEDICINE

## 2024-03-21 PROCEDURE — 82306 VITAMIN D 25 HYDROXY: CPT

## 2024-03-21 PROCEDURE — 83970 ASSAY OF PARATHORMONE: CPT

## 2024-03-21 PROCEDURE — 99213 OFFICE O/P EST LOW 20 MIN: CPT | Performed by: FAMILY MEDICINE
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
## 2024-03-22 LAB — LC CALCIUM, IONIZED: 4.9 MG/DL

## 2024-03-23 LAB — HPV I/H RISK 1 DNA SPEC QL NAA+PROBE: POSITIVE

## 2024-03-26 LAB
HPV16 DNA CVX QL PROBE+SIG AMP: NEGATIVE
HPV18 DNA CVX QL PROBE+SIG AMP: POSITIVE

## 2024-03-27 LAB
.: ABNORMAL
.: ABNORMAL

## 2024-03-28 ENCOUNTER — PATIENT MESSAGE (OUTPATIENT)
Dept: FAMILY MEDICINE CLINIC | Facility: CLINIC | Age: 59
End: 2024-03-28

## 2024-03-28 ENCOUNTER — TELEPHONE (OUTPATIENT)
Dept: FAMILY MEDICINE CLINIC | Facility: CLINIC | Age: 59
End: 2024-03-28

## 2024-03-28 DIAGNOSIS — R87.811 ASCUS WITH POSITIVE HIGH RISK HUMAN PAPILLOMAVIRUS OF VAGINA: Primary | ICD-10-CM

## 2024-03-28 DIAGNOSIS — R87.620 ASCUS WITH POSITIVE HIGH RISK HUMAN PAPILLOMAVIRUS OF VAGINA: Primary | ICD-10-CM

## 2024-03-28 NOTE — TELEPHONE ENCOUNTER
Pt read the results of her pap from 3-21-24.Shows that she may have a yeast infection. Pt has very light symptoms. Pt would like to know if she can take Fluconazole tablet.

## 2024-03-29 ENCOUNTER — TELEPHONE (OUTPATIENT)
Dept: OBGYN CLINIC | Facility: CLINIC | Age: 59
End: 2024-03-29

## 2024-03-29 RX ORDER — FLUCONAZOLE 150 MG/1
150 TABLET ORAL
Qty: 2 TABLET | Refills: 0 | Status: SHIPPED | OUTPATIENT
Start: 2024-03-29

## 2024-03-29 NOTE — TELEPHONE ENCOUNTER
Patient has referral for Dr Chew. She had hysterectomy and abnormal pap smear. She is requesting an appt.

## 2024-03-29 NOTE — TELEPHONE ENCOUNTER
Pt. Is a referral from Dr. Teran ..Please review pt's Abnormal Pap/HPV and ASCUS results..  Please advise..thanks

## 2024-03-29 NOTE — TELEPHONE ENCOUNTER
From: Julieta Goff  To: Tung Do  Sent: 3/28/2024 4:05 PM CDT  Subject: Yeast infection     From what I saw in test results I have a yeast infection. Can I take fluconazole for it? I have a prescription from another drKay From months ago.

## 2024-04-01 DIAGNOSIS — F43.9 STRESS AT HOME: ICD-10-CM

## 2024-04-01 DIAGNOSIS — Z51.81 THERAPEUTIC DRUG MONITORING: ICD-10-CM

## 2024-04-01 DIAGNOSIS — F33.1 MAJOR DEPRESSIVE DISORDER, RECURRENT EPISODE, MODERATE WITH ANXIOUS DISTRESS (HCC): ICD-10-CM

## 2024-04-01 RX ORDER — VALACYCLOVIR HYDROCHLORIDE 1 G/1
1000 TABLET, FILM COATED ORAL AS NEEDED
Qty: 21 TABLET | Refills: 0 | Status: CANCELLED | OUTPATIENT
Start: 2024-04-01

## 2024-04-01 NOTE — TELEPHONE ENCOUNTER
Colposcopy procedure explained in detail- patient aware that minor cramping is expected and any samples taken will be sent to pathology for testing.  Advised 600 mg ibuprofen with food 1 hour prior.  Instructed patient to avoid intercourse for 48 hours prior to procedure to avoid disturbing the cells in that area.  Patient verbalized understanding and has no further questions or concerns.    Routed to PSR- please assist patient with scheduling colposcopy.

## 2024-04-01 NOTE — TELEPHONE ENCOUNTER
History of a hysterectomy with oophorectomy in 2015. Previously diagnosed with HPV. Hysterectomy was 2/2 menorrhagia. Hx of precancerous cells in cervix perhaps in her 20s.     Pap noted for ASCUS, HPV 16+ - vaginal pap smear   Recommend colposcopy   Please inform patient of recommendation and provide information/instructions   If new patient and schedule with me, then pt will need COMPLETE 30 minute blocked time for consultation and procedure

## 2024-04-01 NOTE — TELEPHONE ENCOUNTER
Pt called and said she needs a refill on the following meds:    ALPRAZolam 0.5 MG Oral Tab     Valtrex    ibuprofen 600 MG Oral Tab     She said she found out after having additional X-Rays she had an additional 3 broken ribs and she is out of the IBU 600mg.    She would like them sent to [facility] in [place].

## 2024-04-02 RX ORDER — IBUPROFEN 600 MG/1
600 TABLET ORAL EVERY 6 HOURS PRN
Qty: 40 TABLET | Refills: 0 | Status: SHIPPED | OUTPATIENT
Start: 2024-04-02 | End: 2024-04-12

## 2024-04-02 RX ORDER — ALPRAZOLAM 0.5 MG/1
0.5 TABLET ORAL
Qty: 30 TABLET | Refills: 1 | Status: SHIPPED | OUTPATIENT
Start: 2024-04-02

## 2024-04-02 NOTE — TELEPHONE ENCOUNTER
Pt scheduled   Future Appointments   Date Time Provider Department Center   4/24/2024 10:20 AM PF DEXA RM1 PF DEXA [place]   4/26/2024 10:30 AM PF XR RM1 PF XRAY [place]   5/2/2024 12:30 PM Tung Do DO [facility] [address] [facility] [address]   6/10/2024  1:00 PM Karla Chew MD [facility] [place]

## 2024-04-07 DIAGNOSIS — R12 HEARTBURN: ICD-10-CM

## 2024-04-08 RX ORDER — PANTOPRAZOLE SODIUM 40 MG/1
40 TABLET, DELAYED RELEASE ORAL
Qty: 90 TABLET | Refills: 0 | Status: SHIPPED | OUTPATIENT
Start: 2024-04-08

## 2024-04-08 NOTE — TELEPHONE ENCOUNTER
Requested Prescriptions     Pending Prescriptions Disp Refills    PANTOPRAZOLE 40 MG Oral Tab EC [Pharmacy Med Name: PANTOPRAZOLE 40MG TABLETS] 90 tablet 0     Sig: TAKE 1 TABLET(40 MG) BY MOUTH EVERY MORNING BEFORE BREAKFAST        LOV    12/29/2023 - [facility] virtual phone visit     LR   1/5/2024      sb

## 2024-04-24 ENCOUNTER — HOSPITAL ENCOUNTER (OUTPATIENT)
Dept: BONE DENSITY | Age: 59
Discharge: HOME | End: 2024-04-24
Attending: FAMILY MEDICINE
Payer: MEDICAID

## 2024-04-24 ENCOUNTER — HOSPITAL ENCOUNTER (OUTPATIENT)
Dept: GENERAL RADIOLOGY | Age: 59
Discharge: HOME | End: 2024-04-24
Attending: FAMILY MEDICINE
Payer: MEDICAID

## 2024-04-24 DIAGNOSIS — S22.41XG CLOSED FRACTURE OF MULTIPLE RIBS OF RIGHT SIDE WITH DELAYED HEALING, SUBSEQUENT ENCOUNTER: ICD-10-CM

## 2024-04-24 PROCEDURE — 77080 DXA BONE DENSITY AXIAL: CPT | Performed by: FAMILY MEDICINE

## 2024-04-24 PROCEDURE — 71111 X-RAY EXAM RIBS/CHEST4/> VWS: CPT | Performed by: FAMILY MEDICINE

## 2024-05-02 ENCOUNTER — OFFICE VISIT (OUTPATIENT)
Dept: FAMILY MEDICINE CLINIC | Facility: CLINIC | Age: 59
End: 2024-05-02
Payer: MEDICAID

## 2024-05-02 ENCOUNTER — TELEPHONE (OUTPATIENT)
Dept: FAMILY MEDICINE CLINIC | Facility: CLINIC | Age: 59
End: 2024-05-02

## 2024-05-02 VITALS
WEIGHT: 114 LBS | TEMPERATURE: 98 F | HEART RATE: 80 BPM | RESPIRATION RATE: 16 BRPM | HEIGHT: 61 IN | SYSTOLIC BLOOD PRESSURE: 94 MMHG | BODY MASS INDEX: 21.52 KG/M2 | DIASTOLIC BLOOD PRESSURE: 62 MMHG

## 2024-05-02 DIAGNOSIS — Z51.81 THERAPEUTIC DRUG MONITORING: ICD-10-CM

## 2024-05-02 DIAGNOSIS — S22.41XG: ICD-10-CM

## 2024-05-02 DIAGNOSIS — M85.80 OSTEOPENIA WITH HIGH RISK OF FRACTURE: Primary | ICD-10-CM

## 2024-05-02 DIAGNOSIS — F43.9 STRESS AT HOME: ICD-10-CM

## 2024-05-02 DIAGNOSIS — F33.1 MAJOR DEPRESSIVE DISORDER, RECURRENT EPISODE, MODERATE WITH ANXIOUS DISTRESS (HCC): ICD-10-CM

## 2024-05-02 PROCEDURE — 99213 OFFICE O/P EST LOW 20 MIN: CPT | Performed by: FAMILY MEDICINE

## 2024-05-02 RX ORDER — VALACYCLOVIR HYDROCHLORIDE 1 G/1
1000 TABLET, FILM COATED ORAL AS NEEDED
Qty: 21 TABLET | Refills: 1 | Status: SHIPPED | OUTPATIENT
Start: 2024-05-02

## 2024-05-02 RX ORDER — ALPRAZOLAM 0.5 MG/1
0.5 TABLET ORAL
Qty: 30 TABLET | Refills: 1 | Status: SHIPPED | OUTPATIENT
Start: 2024-05-02
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
## 2024-05-02 NOTE — TELEPHONE ENCOUNTER
Last OV 5/2/24  1. Multiple rib fractures  She has multiple fractures of the right ribs with delayed healing - she fractured her ribs 12 weeks ago and is still in pain on a daily basis. She'd like to know if she's fit to return to work however she admits her pain is constant. At this time I advised her to see cardiothoracic surgery to determine if she is able to return to work. Provided a referral.    Ok to write off work note?

## 2024-05-02 NOTE — TELEPHONE ENCOUNTER
Patient was just seen today. Will need a note for work that she needs to be off longer-does not have a return to work date.

## 2024-05-06 ENCOUNTER — TELEPHONE (OUTPATIENT)
Dept: FAMILY MEDICINE CLINIC | Facility: CLINIC | Age: 59
End: 2024-05-06

## 2024-05-06 DIAGNOSIS — F43.9 STRESS AT HOME: ICD-10-CM

## 2024-05-06 DIAGNOSIS — Z51.81 THERAPEUTIC DRUG MONITORING: ICD-10-CM

## 2024-05-06 DIAGNOSIS — F33.1 MAJOR DEPRESSIVE DISORDER, RECURRENT EPISODE, MODERATE WITH ANXIOUS DISTRESS (HCC): ICD-10-CM

## 2024-05-06 NOTE — TELEPHONE ENCOUNTER
Patient requesting to speak with Triage regarding a referral that was placed for patient to schedule with Thoracic Surg. Patient states that she was advised she needs a referral to see a vascular surgeon.     Please call patient back and advise.

## 2024-05-07 DIAGNOSIS — Z86.19 HX OF COLD SORES: Primary | ICD-10-CM

## 2024-05-07 RX ORDER — VALACYCLOVIR HYDROCHLORIDE 1 G/1
1000 TABLET, FILM COATED ORAL AS NEEDED
Qty: 21 TABLET | Refills: 1 | Status: CANCELLED | OUTPATIENT
Start: 2024-05-07

## 2024-05-07 RX ORDER — BUPROPION HYDROCHLORIDE 300 MG/1
300 TABLET ORAL DAILY
Qty: 90 TABLET | Refills: 3 | Status: SHIPPED | OUTPATIENT
Start: 2024-05-07

## 2024-05-07 RX ORDER — VALACYCLOVIR HYDROCHLORIDE 1 G/1
2000 TABLET, FILM COATED ORAL EVERY 12 HOURS SCHEDULED
Qty: 2 TABLET | Refills: 3 | Status: SHIPPED | OUTPATIENT
Start: 2024-05-07 | End: 2024-05-08

## 2024-05-07 NOTE — TELEPHONE ENCOUNTER
Requested Prescriptions     Pending Prescriptions Disp Refills    BUPROPION  MG Oral Tablet 24 Hr [Pharmacy Med Name: BUPROPION XL 300MG TABLETS] 90 tablet 1     Sig: TAKE 1 TABLET(300 MG) BY MOUTH DAILY       LOV:   RTC: 5/2/24  Last Relevant Labs: 3/21/24  Filled: 9/7/23 #90 with 1 refills    Future Appointments   Date Time Provider Department Center   5/20/2024 12:30 PM Beverley Munoz DO [id number] [place]   6/11/2024  1:00 PM Karla Chew MD [facility] [place]

## 2024-05-10 RX ORDER — ESTRADIOL 10 UG/1
INSERT VAGINAL
Qty: 24 TABLET | Refills: 0 | Status: SHIPPED | OUTPATIENT
Start: 2024-05-10

## 2024-05-10 NOTE — TELEPHONE ENCOUNTER
Requested Prescriptions     Signed Prescriptions Disp Refills    ESTRADIOL 10 MCG Vaginal Tab 24 tablet 0     Sig: INSERT 1 TABLET VAGINALLY 2 TIMES A WEEK     Authorizing Provider: SONIA HELMS     Ordering User: MOSES RODRIGUEZ     Refilled per protocol/OV notes

## 2024-05-13 DIAGNOSIS — I10 ESSENTIAL HYPERTENSION: ICD-10-CM

## 2024-05-13 RX ORDER — LOSARTAN POTASSIUM 50 MG/1
50 TABLET ORAL 2 TIMES DAILY
Qty: 180 TABLET | Refills: 0 | OUTPATIENT
Start: 2024-05-13

## 2024-05-15 ENCOUNTER — PATIENT MESSAGE (OUTPATIENT)
Dept: FAMILY MEDICINE CLINIC | Facility: CLINIC | Age: 59
End: 2024-05-15

## 2024-05-15 DIAGNOSIS — S22.41XG: Primary | ICD-10-CM

## 2024-05-15 NOTE — TELEPHONE ENCOUNTER
From: Julieta Goff  To: Tung Do  Sent: 5/15/2024 10:29 AM CDT  Subject: Ribs    Do you think I should see an orthopedic doctor for my ribs? If so can you refer me to one? Thank you, Julieta Goff

## 2024-05-15 NOTE — TELEPHONE ENCOUNTER
Spoke to [facility]-  Dr. Shah is trauma ortho who would see rib fractures.   Please advise if ok to refer?

## 2024-05-20 ENCOUNTER — OFFICE VISIT (OUTPATIENT)
Facility: CLINIC | Age: 59
End: 2024-05-20

## 2024-05-20 VITALS
SYSTOLIC BLOOD PRESSURE: 118 MMHG | DIASTOLIC BLOOD PRESSURE: 80 MMHG | BODY MASS INDEX: 21.52 KG/M2 | HEART RATE: 79 BPM | OXYGEN SATURATION: 99 % | WEIGHT: 114 LBS | HEIGHT: 61 IN

## 2024-05-20 DIAGNOSIS — M85.859 OSTEOPENIA OF NECK OF FEMUR, UNSPECIFIED LATERALITY: Primary | ICD-10-CM

## 2024-05-20 DIAGNOSIS — E03.9 HYPOTHYROIDISM, UNSPECIFIED TYPE: ICD-10-CM

## 2024-05-20 PROCEDURE — 99204 OFFICE O/P NEW MOD 45 MIN: CPT | Performed by: STUDENT IN AN ORGANIZED HEALTH CARE EDUCATION/TRAINING PROGRAM

## 2024-05-20 NOTE — PROGRESS NOTES
ENDOCRINOLOGY, DIABETES & METABOLISM CONSULT NOTE   Date: 05/20/24  Name: Julieta Goff   Referring Physician: No ref. provider found    Subjective:    HISTORY OF PRESENT ILLNESS:   Julieta Goff is a 58 year old female seen in consultation for her osteopenia    Patient presents to the clinic for an initial bone health evaluation due to a history of DEXA confirmed osteopenia on 4.2024 dxa.     OSTEOPOROSIS RISK FACTORS ASSESSMENT  Postmenopausal NoTAH around 7-8 years ago (early 50's) on HRT for 1 year    Maternal hx of osteoporosis or hx of parental hip fx:  No   Recent Fracture: No  Fell around 1/2024 and diagnosed with right rib fractures that are not healing    Previous fracture after the age of 50:  No   Hx of kidney stones No   Frequent falls No   Poor vision Nohx of cataract surgery; wearing glasses    Decrease in height No   New or Worsening Back Pain No   History of Vit D insufficiency:   Current Vitamin D supplementation: No  D3 2000 international unit     Poor intake of calcium  Daily calcium intake: No  Calcium 1200 mg 2 tabs daily    Caffeine intake Yes, 3-4 glasses of tea + 1 can of coke daily   Smoking No   Alcohol intake >3/d:  Yes, on the weekend will ocassionally have 3 or more glasses of hard liquor   Hx of thyroid disease Yes, hypothyroidism diagnosed 20+ years ago and on LT4 75 mcg daily    Hx of calcium problems or hyperparathyroidism No   Previous treatment for osteoporosis No   Malabsorption, chronic diarrhea, celiac sprue   No   History of RA  No   History of skeletal radiation No   History of eating disorder No     Intake of medications that cause bone loss:    Long term steroid use: Yes, intermittently on steroid taper for asthma, last was 3 years ago  Aromatase inhibitor: No  Seizure medications: No  GnRH agonist: No  PPI: Yes, pantoprazole for 6 months   Lithium: No  SSRI: Yes, many years ago for 1 year  Actos/Avandia: No    Of note, lost around 70 pounds over a small amount of  time   Treatment Contraindications:  Dysphagia: denies  Pain on swallowing: denies  Plans for dental procedures: around 4 months ago for cleaning      Allergies, PMH, SocHx and FHx reviewed and updated as appropriate in Epic on    ESTRADIOL 10 MCG Vaginal Tab INSERT 1 TABLET VAGINALLY 2 TIMES A WEEK 24 tablet 0    buPROPion  MG Oral Tablet 24 Hr TAKE 1 TABLET(300 MG) BY MOUTH DAILY 90 tablet 3    ALPRAZolam 0.5 MG Oral Tab Take 1 tablet (0.5 mg total) by mouth daily as needed for Anxiety. 30 tablet 1    valACYclovir 1 G Oral Tab Take 1 tablet (1,000 mg total) by mouth as needed. 2x 12 21 tablet 1    pantoprazole 40 MG Oral Tab EC Take 1 tablet (40 mg total) by mouth before breakfast. 90 tablet 0    fluconazole (DIFLUCAN) 150 MG Oral Tab Take 1 tablet (150 mg total) by mouth every third day. 2 tablet 0    levothyroxine 75 MCG Oral Tab Take 1 tablet (75 mcg total) by mouth before breakfast. 90 tablet 1    Cranberry 475 MG Oral Cap Take by mouth.      cetirizine 10 MG Oral Tab Take 1 tablet (10 mg total) by mouth daily.      cholecalciferol 25 MCG (1000 UT) Oral Tab Take 1 tablet (1,000 Units total) by mouth daily.      losartan 50 MG Oral Tab TAKE 1 TABLET(50 MG) BY MOUTH TWICE DAILY 180 tablet 0    ketorolac 0.5 % Ophthalmic Solution       SYMBICORT 80-4.5 MCG/ACT Inhalation Aerosol Inhale 2 puffs into the lungs 2 (two) times daily.      fluticasone propionate 50 MCG/ACT Nasal Suspension 1 spray in each nostril Nasally Once a day as needed      montelukast 10 MG Oral Tab Take 1 tablet (10 mg total) by mouth nightly. 90 tablet 1    traZODone 150 MG Oral Tab Take 1 tablet (150 mg total) by mouth nightly as needed for Sleep. 90 tablet 1    CRANBERRY OR Take 15 mg by mouth daily.      loratadine 10 MG Oral Tab Take 1 tablet (10 mg total) by mouth daily as needed for Allergies.      Lifitegrast (XIIDRA OP) Apply to eye 2 (two) times a day.      albuterol 108 (90 Base) MCG/ACT Inhalation Aero Soln Inhale 2 puffs into  the lungs every 4 (four) hours as needed.      Ascorbic Acid (VITAMIN C) 100 MG Oral Tab Take 1 tablet (100 mg total) by mouth in the morning and 1 tablet (100 mg total) before bedtime.      zinc sulfate 220 (50 Zn) MG Oral Cap Take 25 mg by mouth daily.      Multiple Vitamins-Minerals (MULTI-VITAMIN/MINERALS) Oral Tab Take 1 tablet by mouth daily.       Allergies   Allergen Reactions    Epinephrine OTHER (SEE COMMENTS)    Cats Claw, Uncaria Tomentosa RASH    Dander RASH     Dogs      Dust Mites RASH    Grass RASH    Mold RASH     Current Outpatient Medications   Medication Sig Dispense Refill    ESTRADIOL 10 MCG Vaginal Tab INSERT 1 TABLET VAGINALLY 2 TIMES A WEEK 24 tablet 0    buPROPion  MG Oral Tablet 24 Hr TAKE 1 TABLET(300 MG) BY MOUTH DAILY 90 tablet 3    ALPRAZolam 0.5 MG Oral Tab Take 1 tablet (0.5 mg total) by mouth daily as needed for Anxiety. 30 tablet 1    valACYclovir 1 G Oral Tab Take 1 tablet (1,000 mg total) by mouth as needed. 2x 12 21 tablet 1    pantoprazole 40 MG Oral Tab EC Take 1 tablet (40 mg total) by mouth before breakfast. 90 tablet 0    fluconazole (DIFLUCAN) 150 MG Oral Tab Take 1 tablet (150 mg total) by mouth every third day. 2 tablet 0    levothyroxine 75 MCG Oral Tab Take 1 tablet (75 mcg total) by mouth before breakfast. 90 tablet 1    Cranberry 475 MG Oral Cap Take by mouth.      cetirizine 10 MG Oral Tab Take 1 tablet (10 mg total) by mouth daily.      cholecalciferol 25 MCG (1000 UT) Oral Tab Take 1 tablet (1,000 Units total) by mouth daily.      losartan 50 MG Oral Tab TAKE 1 TABLET(50 MG) BY MOUTH TWICE DAILY 180 tablet 0    ketorolac 0.5 % Ophthalmic Solution       SYMBICORT 80-4.5 MCG/ACT Inhalation Aerosol Inhale 2 puffs into the lungs 2 (two) times daily.      fluticasone propionate 50 MCG/ACT Nasal Suspension 1 spray in each nostril Nasally Once a day as needed      montelukast 10 MG Oral Tab Take 1 tablet (10 mg total) by mouth nightly. 90 tablet 1    traZODone 150  MG Oral Tab Take 1 tablet (150 mg total) by mouth nightly as needed for Sleep. 90 tablet 1    CRANBERRY OR Take 15 mg by mouth daily.      loratadine 10 MG Oral Tab Take 1 tablet (10 mg total) by mouth daily as needed for Allergies.      Lifitegrast (XIIDRA OP) Apply to eye 2 (two) times a day.      albuterol 108 (90 Base) MCG/ACT Inhalation Aero Soln Inhale 2 puffs into the lungs every 4 (four) hours as needed.      Ascorbic Acid (VITAMIN C) 100 MG Oral Tab Take 1 tablet (100 mg total) by mouth in the morning and 1 tablet (100 mg total) before bedtime.      zinc sulfate 220 (50 Zn) MG Oral Cap Take 25 mg by mouth daily.      Multiple Vitamins-Minerals (MULTI-VITAMIN/MINERALS) Oral Tab Take 1 tablet by mouth daily.       Past Medical History:    Allergic rhinitis    Anxiety    Arthritis    Asthma (HCC)    Depression    Esophageal reflux    Fatty liver    2023 ultrasound of abdomen    Hyperlipidemia    Hyperthyroidism    Renal insufficiency     Past Surgical History:   Procedure Laterality Date    Colonoscopy      Hysterectomy      -          Other surgical history      Eye surgery    Tubal ligation       Social History     Socioeconomic History    Marital status:    Tobacco Use    Smoking status: Never     Passive exposure: Never    Smokeless tobacco: Never   Vaping Use    Vaping status: Never Used   Substance and Sexual Activity    Alcohol use: Yes     Alcohol/week: 3.0 - 4.0 standard drinks of alcohol     Types: 3 - 4 Standard drinks or equivalent per week     Comment: occ    Drug use: Never   Other Topics Concern    Caffeine Concern No    Exercise No    Seat Belt Yes    Special Diet No    Stress Concern Yes    Weight Concern No     Service No    Blood Transfusions No    Occupational Exposure No    Hobby Hazards No    Sleep Concern No    Back Care No    Bike Helmet No    Self-Exams No     Family History   Problem Relation Age of Onset    Diabetes Father     Stroke Father      Depression Father     Obesity Sister        REVIEW OF SYSTEMS: 10 point ROS completed, refer to HPI for pertinent positives    Objective:   PHYSICAL EXAMINATION:  Vital Signs:   Vitals:    05/20/24 1231   BP: 118/80   Pulse: 79      General Appearance:  Alert, in no acute distress, well developed  Eyes:  normal conjunctivae, sclera  Ears/Nose/Mouth/Throat/Neck:  normal hearing, normal speech and no palpable thyroid nodules  Respiratory:  breathing comfortably on room air, clear to auscultation bilaterally  Cardiovascular:  regular rate and rhythm, no murmurs, S3 or S4, no peripheral edema  Psychiatric:  Oriented to person, place and time, appropriate mood & affect  Skin: Normal moisture and skin texture  Neuro: sensory grossly intact, motor grossly intact. normal gait.    Recent Labs: Personally reviewed in EPIC under lab tab on 5/20/2024  Interpretation: WNL  3/2024 vit d 65, ionized calcium 4.9, pth 60  2/2024 TSH 0.566, , ca9.2, egfr 68 with cr 0.96    Radiology:  Independently visualized on 5/20/2024  I reviewed the patient's records from outside facility which revealed: osteopenia    DXA Scans:  Date L2-L4 BMD T-score % change Mean Femoral Neck BMD T-score % change   4/24/2024 pf 1.075 0.3 0.664 -1.7   2/17/2023 pf 1.096 0.4 0.752 -0.9           ASSESSMENT/PLAN:  Julieta Goff is a 58 year old female seen in consultation for:    Osteopenia of neck of femur, unspecified laterality  - Renal Function Panel; Future  - Monoclonal Protein Study; Future  - C-Telopeptide ([employer]); Future  - Alkaline Phosphatase, Bone Specific; Future  - MAGNESIUM [622][Q]; Future   Discussed pathophysiology of bone loss and clinical significance of DEXA scans with the patient.  The patient has confirmed osteopenia with low T-scores in the mean femoral neck. Diagnosed with right rib fractures.  Explained the patient's risk factors for osteopenia include menopause at age 48, age, and SSRI hx. The patient is at high  risk for future osteoporotic fractures if her osteoporosis remains untreated.  Recommended workup for secondary causes of osteoporosis, including SPEP, PTH, celiac screen, Alk Phos levels, and vitamin D levels. Will discuss next steps once labs result   Discussed the importance of adopting lifestyle measures, such as adequate calcium and vitamin D intake, exercise, smoking cessation, counseling on fall prevention, and avoidance of heavy alcohol use, to reduce bone loss in postmenopausal women.  Suggested 1200 mg of elemental calcium daily (total diet plus supplement) and 2000 IU of vitamin D daily as general recommendations.  Recommended pharmacologic therapy for postmenopausal women with established osteoporosis or fragility fracture.  Discussed available treatment options for osteoporosis, including bisphosphonates (oral vs. IV), anabolics, Evenity, and Prolia injections, as well as their indications, risks, and benefits, including black box warnings.  Discussed concerns about an increased risk of vertebral fracture after discontinuation of denosumab, the need for indefinite administration of denosumab   Recommended DXA every two years for patients starting on therapy, with additional evaluation for contributing factors if a clinically significant BMD decrease or new fracture occurs.     Hypothyroidism, unspecified type  - TSH and Free T4 [E]; Future  - Triiodothyronine (T3) Total [E]; Future   TFTs with TSH 0.566 2/2024 on LT4 75 mcg daily  Patient is compliant with current LT4 administration and is taking it appropriately   Discussed with pt the proper administration of LT4: ideally first thing in the morning on an empty stomach and taken only with water, separate from all other foods/beverages/medications by 30-60 minutes.    Repeat levels ordered     The above assessment and plan was discussed with the patient. The patient noted understanding and agreement with the plan listed above.      Visit Duration : A  total of 45 minutes was spent today on obtaining history, reviewing pertinent labs, evaluating patient, providing multiple treatment options, reinforcing diet/exercise and compliance, and completing documentation.      Note to patient: The 21 Century Cures Act makes medical notes like these available to patients in the interest of transparency. However, be advised this is a medical document. It is intended as peer to peer communication. It is written in medical language and may contain abbreviations or verbiage that are unfamiliar. It may appear blunt or direct. Medical documents are intended to carry relevant information, facts as evident, and the clinical opinion of the practitioner      Beverley Munoz DO  Endocrinology, Diabetes & Metabolism   5/20/2024

## 2024-05-20 NOTE — PATIENT INSTRUCTIONS
Return Visit   [ x ] Physician in 6 weeks   [  ] In person or video visit  [  ] In person only    [ x ] After visit summary   [ x ] Placed labs/imaging. Labs are to be drawn at 8A and fasting.      It was great seeing you today!    Today we discussed your osteopenia and recent fracture:  -Please compete your secondary evaluation for bone loss. Once your labs result, I will be in touch regarding if you need any further testing prior to our follow up  -Otherwise, we will plan on discussing next steps at our follow up visit  -Continue vitamin D 2000 units total (daily) and your current calcium supplement. I will let you know if you need to decrease to 1200 mg daily   -We discussed fall precautions and exercise  -Continue your current levothyroxine and we will repeat thyroid labs     Take care!  -Dr. Munoz

## 2024-05-24 ENCOUNTER — OFFICE VISIT (OUTPATIENT)
Dept: ORTHOPEDICS CLINIC | Facility: CLINIC | Age: 59
End: 2024-05-24

## 2024-05-24 ENCOUNTER — TELEPHONE (OUTPATIENT)
Facility: CLINIC | Age: 59
End: 2024-05-24

## 2024-05-24 DIAGNOSIS — R12 HEARTBURN: ICD-10-CM

## 2024-05-24 DIAGNOSIS — S22.41XD CLOSED FRACTURE OF MULTIPLE RIBS OF RIGHT SIDE WITH ROUTINE HEALING, SUBSEQUENT ENCOUNTER: Primary | ICD-10-CM

## 2024-05-24 PROBLEM — S22.41XA MULTIPLE CLOSED FRACTURES OF RIBS OF RIGHT SIDE: Status: ACTIVE | Noted: 2024-05-24

## 2024-05-24 RX ORDER — PANTOPRAZOLE SODIUM 40 MG/1
40 TABLET, DELAYED RELEASE ORAL
Qty: 90 TABLET | Refills: 1 | Status: SHIPPED | OUTPATIENT
Start: 2024-05-24

## 2024-05-24 NOTE — TELEPHONE ENCOUNTER
Spoke to patient    I offered to send the medication to [facility] and she was agreeable.   Pharmacy changed and medication reordered.

## 2024-05-24 NOTE — TELEPHONE ENCOUNTER
Patient states that her insurance will only cover 4 prescriptions a year.  She states that Pantoprazole will required a Prior Authorization.

## 2024-05-25 NOTE — PATIENT INSTRUCTIONS
Dear Julieta Goff ,    It was a pleasure seeing you today. I saw you for your rib fractures. Today we discussed the diagnosis, therapy,.    I ordered the following for you:     - Referrals: Physical therapy  - Medications: Diclofenac gel    Please don't hesitate to reach out if you have any questions or concerns. We will plan to follow-up next as needed for return of or worsening of pain/dysfunction.     Sincerely,  Dr. Jose Alfredo Shah
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
## 2024-06-03 RX ORDER — ESTRADIOL 10 UG/1
INSERT VAGINAL
Qty: 24 TABLET | Refills: 0 | OUTPATIENT
Start: 2024-06-03

## 2024-06-11 ENCOUNTER — OFFICE VISIT (OUTPATIENT)
Dept: OBGYN CLINIC | Facility: CLINIC | Age: 59
End: 2024-06-11
Payer: MEDICAID

## 2024-06-11 VITALS
SYSTOLIC BLOOD PRESSURE: 118 MMHG | DIASTOLIC BLOOD PRESSURE: 72 MMHG | BODY MASS INDEX: 21 KG/M2 | WEIGHT: 112.38 LBS | HEART RATE: 81 BPM

## 2024-06-11 DIAGNOSIS — R87.610 ASCUS WITH POSITIVE HIGH RISK HPV CERVICAL: Primary | ICD-10-CM

## 2024-06-11 DIAGNOSIS — N76.0 VAGINITIS AND VULVOVAGINITIS: ICD-10-CM

## 2024-06-11 DIAGNOSIS — R87.810 ASCUS WITH POSITIVE HIGH RISK HPV CERVICAL: Primary | ICD-10-CM

## 2024-06-11 DIAGNOSIS — Z90.710 HISTORY OF HYSTERECTOMY: ICD-10-CM

## 2024-06-11 DIAGNOSIS — R87.811 HUMAN PAPILLOMAVIRUS (HPV) TYPE 16 DNA DETECTED IN VAGINAL SPECIMEN: ICD-10-CM

## 2024-06-11 RX ORDER — FLUCONAZOLE 150 MG/1
150 TABLET ORAL ONCE
Qty: 1 TABLET | Refills: 1 | Status: SHIPPED | OUTPATIENT
Start: 2024-06-11 | End: 2024-06-11

## 2024-06-11 NOTE — PROGRESS NOTES
[facility]  Obstetrics and Gynecology Referral  History & Physical    CC: Patient is a new patient and referred for abnormal Pap smear    Subjective:     HPI: Julieta Goff is a 58 year old  female referred for abnormal Pap smear. Patient reports ASCUS, HPV 16+ - vaginal pap smear noted on pap smear on 24. History of a hysterectomy with oophorectomy in . Previously diagnosed with HPV. Hysterectomy was 2/2 menorrhagia. Hx of precancerous cells in cervix perhaps in her 20s. She has not had Gardisil vaccine.  Reports history of recurrent yeast infection.    OB History:  OB History    Para Term  AB Living   2 2 1 0 0 2   SAB IAB Ectopic Multiple Live Births   0 0 0 0 2      # Outcome Date GA Lbr Angel/2nd Weight Sex Type Anes PTL Lv   2 Term     M NORMAL SPONT   JAHAIRA   1 Para     M NORMAL SPONT   JAHAIRA       Gyne History:  Menarche: 10  Period Cycle (Days): Hysterectomy  Period Duration (Days): N/A  Period Flow: N/A  Use of Birth Control (if yes, specify type): Hysterectomy  Hx Prior Abnormal Pap: Yes  Pap Date: 24  Pap Result Notes: EPITHELIAL CELL ABNORMALITY. ATYPICAL SQUAMOUS CELLS OF UNDETERMINED SIGNIFICANCE (ASC-US). FUNGAL ORGANISMS MORPHOLOGICALLY CONSISTENT WITH CAMILLA SPECIES ARE PRESENT HPV+  No LMP recorded. Patient has had a hysterectomy.      Meds:  Current Outpatient Medications on File Prior to Visit   Medication Sig Dispense Refill    pantoprazole 40 MG Oral Tab EC Take 1 tablet (40 mg total) by mouth before breakfast. 90 tablet 1    diclofenac 1 % External Gel Apply 4 g topically 4 (four) times daily. 480 g 0    ESTRADIOL 10 MCG Vaginal Tab INSERT 1 TABLET VAGINALLY 2 TIMES A WEEK 24 tablet 0    buPROPion  MG Oral Tablet 24 Hr TAKE 1 TABLET(300 MG) BY MOUTH DAILY 90 tablet 3    ALPRAZolam 0.5 MG Oral Tab Take 1 tablet (0.5 mg total) by mouth daily as needed for Anxiety. 30 tablet 1    valACYclovir 1 G Oral Tab Take 1 tablet (1,000 mg total) by mouth  as needed. 2x 12 21 tablet 1    fluconazole (DIFLUCAN) 150 MG Oral Tab Take 1 tablet (150 mg total) by mouth every third day. 2 tablet 0    levothyroxine 75 MCG Oral Tab Take 1 tablet (75 mcg total) by mouth before breakfast. 90 tablet 1    Cranberry 475 MG Oral Cap Take by mouth.      cetirizine 10 MG Oral Tab Take 1 tablet (10 mg total) by mouth daily.      cholecalciferol 25 MCG (1000 UT) Oral Tab Take 1 tablet (1,000 Units total) by mouth daily.      losartan 50 MG Oral Tab TAKE 1 TABLET(50 MG) BY MOUTH TWICE DAILY 180 tablet 0    ketorolac 0.5 % Ophthalmic Solution       SYMBICORT 80-4.5 MCG/ACT Inhalation Aerosol Inhale 2 puffs into the lungs 2 (two) times daily.      fluticasone propionate 50 MCG/ACT Nasal Suspension 1 spray in each nostril Nasally Once a day as needed      montelukast 10 MG Oral Tab Take 1 tablet (10 mg total) by mouth nightly. 90 tablet 1    traZODone 150 MG Oral Tab Take 1 tablet (150 mg total) by mouth nightly as needed for Sleep. 90 tablet 1    CRANBERRY OR Take 15 mg by mouth daily.      loratadine 10 MG Oral Tab Take 1 tablet (10 mg total) by mouth daily as needed for Allergies.      Lifitegrast (XIIDRA OP) Apply to eye 2 (two) times a day.      albuterol 108 (90 Base) MCG/ACT Inhalation Aero Soln Inhale 2 puffs into the lungs every 4 (four) hours as needed.      Ascorbic Acid (VITAMIN C) 100 MG Oral Tab Take 1 tablet (100 mg total) by mouth in the morning and 1 tablet (100 mg total) before bedtime.      zinc sulfate 220 (50 Zn) MG Oral Cap Take 25 mg by mouth daily.      Multiple Vitamins-Minerals (MULTI-VITAMIN/MINERALS) Oral Tab Take 1 tablet by mouth daily.       No current facility-administered medications on file prior to visit.       All:  Allergies   Allergen Reactions    Epinephrine OTHER (SEE COMMENTS)    Cats Claw, Uncaria Tomentosa RASH    Dander RASH     Dogs      Dust Mites RASH    Grass RASH    Mold RASH       PMH:  Past Medical History:    Allergic rhinitis    Anxiety     Arthritis    Asthma (HCC)    Depression    Esophageal reflux    Fatty liver    2023 ultrasound of abdomen    Hyperlipidemia    Hyperthyroidism    Renal insufficiency       PSH:  Past Surgical History:   Procedure Laterality Date    Colonoscopy      Hysterectomy      -          Other surgical history      Eye surgery    Tubal ligation         Social History:  Social History     Socioeconomic History    Marital status:      Spouse name: Not on file    Number of children: Not on file    Years of education: Not on file    Highest education level: Not on file   Occupational History    Not on file   Tobacco Use    Smoking status: Never     Passive exposure: Never    Smokeless tobacco: Never   Vaping Use    Vaping status: Never Used   Substance and Sexual Activity    Alcohol use: Yes     Alcohol/week: 3.0 - 4.0 standard drinks of alcohol     Types: 3 - 4 Standard drinks or equivalent per week     Comment: occ    Drug use: Never    Sexual activity: Yes     Birth control/protection: Hysterectomy   Other Topics Concern    Caffeine Concern No    Exercise No    Seat Belt Yes    Special Diet No    Stress Concern Yes    Weight Concern No     Service No    Blood Transfusions No    Occupational Exposure No    Hobby Hazards No    Sleep Concern No    Back Care No    Bike Helmet No    Self-Exams No   Social History Narrative    Not on file     Social Determinants of Health     Financial Resource Strain: Not on file   Food Insecurity: Not on file   Transportation Needs: Not on file   Physical Activity: Not on file   Stress: Not on file   Social Connections: Not on file   Housing Stability: Not on file         Family History:  Family History   Problem Relation Age of Onset    Diabetes Father     Stroke Father     Depression Father     Obesity Sister        Review of Systems:  General: no complaints per category. See HPI for additional information.   Breast: no complaints per category. See HPI  for additional information.   Respiratory: no complaints per category. See HPI for additional information.   Cardiovascular: no complaints per category. See HPI for additional information.   GI: no complaints per category. See HPI for additional information.   : no complaints per category. See HPI for additional information.   Heme: no complaints per category. See HPI for additional information.       Objective:     Vitals:    24 1305   BP: 118/72   Pulse: 81   Weight: 112 lb 6.4 oz (51 kg)         Body mass index is 21.24 kg/m².    General: AAO.NAD.   CVS exam: normal peripheral perfusion  Chest: non-labored breathing, no tachypnea   Abdominal exam: deferred   Pelvic exam:   VULVA: normal appearing vulva with no masses, tenderness or lesions  PERINEUM: normal appearing, no lesions   URETHRAL MEATUS:  normal appearing, no lesions   VAGINA: normal appearing vagina with normal color and discharge, no lesions  CERVIX: surgically absent  UTERUS: surgically absent, vaginal cuff well healed  ADNEXA: deferred   PERIRECTAL: normal appearing, no lesions   Ext: non-tender, no edema        Assessment:     Julieta Goff is a 58 year old  female referred for abnormal Pap smear.         Plan:     Problem List Items Addressed This Visit          Genitourinary and Reproductive    History of hysterectomy    Human papillomavirus (HPV) type 16 DNA detected in vaginal specimen    ASCUS with positive high risk HPV cervical - Primary     Other Visit Diagnoses       Vaginitis and vulvovaginitis                  abnormal Pap smear  -ASCUS, HPV 16+ - vaginal pap smear noted on pap smear on 24  -Findings reviewed and discussed with patient  -Recommend colposcopy, refer to procedure note  -Recommend follow-up in 1 year or sooner if needed pending results of colposcopy  HPV vaccine  -Discussed with patient risks, benefits and alternatives of vaccination including off-label use  Vulvovaginitis history  History of  recurrent yeast infection   -new Rx for fluconazole provided       All of the findings and plan were discussed with the patient.  She notes understanding and agrees with the plan of care.  All questions were answered to the best of my ability at this time.      Total patient time was 30 minutes in evaluation, consultation, and coordination of care.  This included face to face and non-face to face actions. The patient's questions and concerns were addressed.  This was a separate E/M service.    RTC in 1 year for a well woman exam or sooner if needed     Karla Chew MD   [facility] - OBGYN        Discussed with patient that there will not be further notification of normal or benign results other than receiving results on mychart. A [facility] [place] message or telephone call will be placed by the physician and/or office staff if results are abnormal.     Note to patient and family   The 21st Century Cures Act makes medical notes available to patients in the interest of transparency.  However, please be advised that this is a medical document.  It is intended as [id number]-[id number] communication.  It is written and medical language may contain abbreviations or verbiage that are technical and unfamiliar.  It may appear blunt or direct.  Medical documents are intended to carry relevant information, facts as evident, and the clinical opinion of the practitioner.        This note could include assistance by Dragon voice recognition. Errors in content may be related to improper recognition by the system; efforts to review and correct have been done but errors may still exist.

## 2024-06-11 NOTE — PROCEDURES
Colposcopy Procedure Note    Date of Procedure: 06/17/24    Indications: 58 year old y/o female with ASCUS, HPV 16+ - vaginal pap smear noted on pap smear on 03/21/24. History of a hysterectomy with oophorectomy in 2015. Previously diagnosed with HPV. Hysterectomy was 2/2 menorrhagia. Hx of precancerous cells in cervix perhaps in her 20s.     Pre-procedure diagnosis:   ASCUS, HPV 16+     Post-procedure diagnosis:  ASCUS, HPV 16+     Procedure:  Colposcopy     Procedure Details:   A discussion was held with patient including diagnosis, prognosis and management. Recommendation made for colposcopy with possible biopsies. Risks, benefits and alteratives were discussed with the patient. The patient was agreed to proceed with procedure. She provided written and verbal consent. The patient was positioned supine and in stir-ups.    The sterile speculum was placed in the vagina was visualized. The vagina appeared normal with no lesions or discolorations noted. No lesions seen under gross examination. Green light filter was negative.      After application of acetic acid, no white feathery acetowhite changes were noted. No mocaism, no punctations seen on gross examination. This findings were consistent after the application of Lugol's solution.     A biopsy was not indicated.     Good hemostasis noted. All instruments were removed from the vagina.     Impression: no ROMANA    Disposition: Stable. RTC in 1 year for well woman exam or sooner if needed.        Karla Chew MD   [facility] - OBGYN    Discussed with patient that there will not be further notification of normal or benign results other than receiving results on [email or web address]. A [employer] message or telephone call will be placed by the physician and/or office staff if results are abnormal.       Note to patient and family   The 21st Century Cures Act makes medical notes available to patients in the interest of transparency.  However, please be advised that this is a medical  document.  It is intended as [id number]-yb-[id number] communication.  It is written and medical language may contain abbreviations or verbiage that are technical and unfamiliar.  It may appear blunt or direct.  Medical documents are intended to carry relevant information, facts as evident, and the clinical opinion of the practitioner.      This note could include assistance by Dragon voice recognition. Errors in content may be related to improper recognition by the system; efforts to review and correct have been done but errors may still exist.

## 2024-06-12 DIAGNOSIS — Z51.81 THERAPEUTIC DRUG MONITORING: ICD-10-CM

## 2024-06-12 DIAGNOSIS — F43.9 STRESS AT HOME: ICD-10-CM

## 2024-06-12 DIAGNOSIS — F33.1 MAJOR DEPRESSIVE DISORDER, RECURRENT EPISODE, MODERATE WITH ANXIOUS DISTRESS (HCC): ICD-10-CM

## 2024-06-13 RX ORDER — TRAZODONE HYDROCHLORIDE 150 MG/1
150 TABLET ORAL NIGHTLY PRN
Qty: 90 TABLET | Refills: 1 | OUTPATIENT
Start: 2024-06-13

## 2024-06-14 ENCOUNTER — TELEPHONE (OUTPATIENT)
Dept: OBGYN CLINIC | Facility: CLINIC | Age: 59
End: 2024-06-14

## 2024-06-17 PROBLEM — R87.811: Status: ACTIVE | Noted: 2024-06-17

## 2024-06-17 PROBLEM — R87.610 ASCUS WITH POSITIVE HIGH RISK HPV CERVICAL: Status: ACTIVE | Noted: 2024-06-17

## 2024-06-17 PROBLEM — R87.810 HUMAN PAPILLOMAVIRUS (HPV) TYPE 16 DNA DETECTED IN CERVICAL SPECIMEN: Status: ACTIVE | Noted: 2024-06-17

## 2024-06-17 PROBLEM — R87.810 ASCUS WITH POSITIVE HIGH RISK HPV CERVICAL: Status: ACTIVE | Noted: 2024-06-17

## 2024-06-17 NOTE — PATIENT INSTRUCTIONS
[facility] [place] [facility] Department of OB/GYN  After Care Instructions for Colposcopy/Biopsy      Biopsy Results   You will receive a phone call with your biopsy results in 7 business days.  If you have not received your biopsy results in 7 days, please contact our office.  Your biopsy results will help the physician decide if and what further treatment is  necessary.    Bleeding   After your biopsy, the area is swabbed with a brown solution to help stop any bleeding.  For this reason, you may notice a brown or black clotty discharge lasting several days.  If you experience bright red bleeding that is heavy, you should call the office.     Restrictions    You should avoid douching, intercourse and tampon use for 3 days following your procedure.    Pain    If you are uncomfortable after the procedure, you may use Aleve, Tylenol or Ibuprofen for the discomfort.        If you have additional questions or concerns, please call us at [phone].

## 2024-06-17 NOTE — TELEPHONE ENCOUNTER
Notified pt of no PA r/t off label use due to age. Pt states she can not afford to pay out of pocket at this time.

## 2024-06-19 ENCOUNTER — LAB ENCOUNTER (OUTPATIENT)
Dept: LAB | Age: 59
End: 2024-06-19
Attending: FAMILY MEDICINE

## 2024-06-26 ENCOUNTER — LAB ENCOUNTER (OUTPATIENT)
Dept: LAB | Age: 59
End: 2024-06-26
Attending: STUDENT IN AN ORGANIZED HEALTH CARE EDUCATION/TRAINING PROGRAM

## 2024-06-26 DIAGNOSIS — E03.9 HYPOTHYROIDISM, UNSPECIFIED TYPE: ICD-10-CM

## 2024-06-26 DIAGNOSIS — M85.859 OSTEOPENIA OF NECK OF FEMUR, UNSPECIFIED LATERALITY: ICD-10-CM

## 2024-06-26 LAB
ALBUMIN SERPL-MCNC: 3.6 G/DL (ref 3.4–5)
ANION GAP SERPL CALC-SCNC: 6 MMOL/L (ref 0–18)
BUN BLD-MCNC: 10 MG/DL (ref 9–23)
CALCIUM BLD-MCNC: 9.3 MG/DL (ref 8.5–10.1)
CHLORIDE SERPL-SCNC: 109 MMOL/L (ref 98–112)
CO2 SERPL-SCNC: 28 MMOL/L (ref 21–32)
CREAT BLD-MCNC: 1.06 MG/DL
EGFRCR SERPLBLD CKD-EPI 2021: 60 ML/MIN/1.73M2 (ref 60–?)
GLUCOSE BLD-MCNC: 87 MG/DL (ref 70–99)
MAGNESIUM SERPL-MCNC: 2 MG/DL (ref 1.6–2.6)
OSMOLALITY SERPL CALC.SUM OF ELEC: 294 MOSM/KG (ref 275–295)
PHOSPHATE SERPL-MCNC: 3.2 MG/DL (ref 2.5–4.9)
POTASSIUM SERPL-SCNC: 4.3 MMOL/L (ref 3.5–5.1)
SODIUM SERPL-SCNC: 143 MMOL/L (ref 136–145)
T3 SERPL-MCNC: 79 NG/DL (ref 60–181)
T4 FREE SERPL-MCNC: 1.1 NG/DL (ref 0.8–1.7)
TSI SER-ACNC: 0.9 MIU/ML (ref 0.36–3.74)

## 2024-06-26 PROCEDURE — 84165 PROTEIN E-PHORESIS SERUM: CPT

## 2024-06-26 PROCEDURE — 36415 COLL VENOUS BLD VENIPUNCTURE: CPT

## 2024-06-26 PROCEDURE — 86334 IMMUNOFIX E-PHORESIS SERUM: CPT

## 2024-06-26 PROCEDURE — 80069 RENAL FUNCTION PANEL: CPT

## 2024-06-26 PROCEDURE — 84439 ASSAY OF FREE THYROXINE: CPT

## 2024-06-26 PROCEDURE — 83521 IG LIGHT CHAINS FREE EACH: CPT

## 2024-06-26 PROCEDURE — 84480 ASSAY TRIIODOTHYRONINE (T3): CPT

## 2024-06-26 PROCEDURE — 84080 ASSAY ALKALINE PHOSPHATASES: CPT

## 2024-06-26 PROCEDURE — 84443 ASSAY THYROID STIM HORMONE: CPT

## 2024-06-26 PROCEDURE — 83735 ASSAY OF MAGNESIUM: CPT

## 2024-06-26 PROCEDURE — 82523 COLLAGEN CROSSLINKS: CPT

## 2024-06-28 LAB — ALKALINE PHOSPHATASE BONE SPECIFIC: 11.3 UG/L

## 2024-07-01 LAB
ALBUMIN SERPL ELPH-MCNC: 3.75 G/DL (ref 3.75–5.21)
ALBUMIN/GLOB SERPL: 1.47 {RATIO} (ref 1–2)
ALPHA1 GLOB SERPL ELPH-MCNC: 0.26 G/DL (ref 0.19–0.46)
ALPHA2 GLOB SERPL ELPH-MCNC: 0.58 G/DL (ref 0.48–1.05)
B-GLOBULIN SERPL ELPH-MCNC: 0.74 G/DL (ref 0.68–1.23)
C-TELOPEPTIDE: 821 PG/ML
GAMMA GLOB SERPL ELPH-MCNC: 0.96 G/DL (ref 0.62–1.7)
KAPPA LC FREE SER-MCNC: 1.9 MG/DL (ref 0.33–1.94)
KAPPA LC FREE/LAMBDA FREE SER NEPH: 0.89 {RATIO} (ref 0.26–1.65)
LAMBDA LC FREE SERPL-MCNC: 2.12 MG/DL (ref 0.57–2.63)
PROT SERPL-MCNC: 6.3 G/DL (ref 5.7–8.2)

## 2024-07-02 ENCOUNTER — OFFICE VISIT (OUTPATIENT)
Facility: CLINIC | Age: 59
End: 2024-07-02
Payer: MEDICAID

## 2024-07-02 VITALS
SYSTOLIC BLOOD PRESSURE: 120 MMHG | HEART RATE: 73 BPM | DIASTOLIC BLOOD PRESSURE: 66 MMHG | OXYGEN SATURATION: 97 % | HEIGHT: 61 IN | WEIGHT: 114 LBS | BODY MASS INDEX: 21.52 KG/M2

## 2024-07-02 DIAGNOSIS — M85.859 OSTEOPENIA OF NECK OF FEMUR, UNSPECIFIED LATERALITY: Primary | ICD-10-CM

## 2024-07-02 DIAGNOSIS — E03.9 HYPOTHYROIDISM, UNSPECIFIED TYPE: ICD-10-CM

## 2024-07-02 PROCEDURE — 99214 OFFICE O/P EST MOD 30 MIN: CPT | Performed by: STUDENT IN AN ORGANIZED HEALTH CARE EDUCATION/TRAINING PROGRAM

## 2024-07-02 NOTE — PROGRESS NOTES
ENDOCRINOLOGY, DIABETES & METABOLISM CONSULT NOTE   Date: 7/2/24  Name: Julieta Goff   Referring Physician: No ref. provider found    Subjective:    HISTORY OF PRESENT ILLNESS:   Julieta Goff is a 58 year old female seen in consultation for her osteopenia    Initial HPI 5/2024  Patient presents to the clinic for an initial bone health evaluation due to a history of DEXA confirmed osteopenia on 4/2024 DXA.    OSTEOPOROSIS RISK FACTORS ASSESSMENT  Postmenopausal DORA around 7-8 years ago (early 50's) on HRT for 1 year    Maternal hx of osteoporosis or hx of parental hip fx:  No   Recent Fracture:   Fell around 1/2024 and diagnosed with right rib fractures that are not healing    Previous fracture after the age of 50:  No   Hx of kidney stones No   Frequent falls No   Poor vision Nohx of cataract surgery; wearing glasses    Decrease in height No   New or Worsening Back Pain No   History of Vit D insufficiency:   Current Vitamin D supplementation: No  D3 2000 international unit     Poor intake of calcium  Daily calcium intake: No  Calcium 1200 mg 2 tabs daily    Caffeine intake Yes, 3-4 glasses of tea + 1 can of coke daily   Smoking No   Alcohol intake >3/d:  Yes, on the weekend will ocassionally have 3 or more glasses of hard liquor   Hx of thyroid disease Yes, hypothyroidism diagnosed 20+ years ago and on LT4 75 mcg daily    Hx of calcium problems or hyperparathyroidism No   Previous treatment for osteoporosis No   Malabsorption, chronic diarrhea, celiac sprue   No   History of RA  No   History of skeletal radiation No   History of eating disorder No     Intake of medications that cause bone loss:    Long term steroid use: Yes, intermittently on steroid taper for asthma, last was 3 years ago  Aromatase inhibitor: No  Seizure medications: No  GnRH agonist: No  PPI: Yes, pantoprazole for 6 months   Lithium: No  SSRI: Yes, many years ago for 1 year  Actos/Avandia: No    Of note, lost around 70 pounds over a  small amount of time   Treatment Contraindications:  Dysphagia: denies  Pain on swallowing: denies  Plans for dental procedures: around 4 months ago for cleaning     7/2024  Follows with nephrology  Following with orthopedic regarding rib fracture which is healing   Notes intermittent rib pain   Continued on calcium and vitamin D supplement      Allergies, PMH, SocHx and FHx reviewed and updated as appropriate in Epic on    Cyanocobalamin (B-12 OR) Take by mouth.      pantoprazole 40 MG Oral Tab EC Take 1 tablet (40 mg total) by mouth before breakfast. 90 tablet 1    ESTRADIOL 10 MCG Vaginal Tab INSERT 1 TABLET VAGINALLY 2 TIMES A WEEK 24 tablet 0    buPROPion  MG Oral Tablet 24 Hr TAKE 1 TABLET(300 MG) BY MOUTH DAILY 90 tablet 3    ALPRAZolam 0.5 MG Oral Tab Take 1 tablet (0.5 mg total) by mouth daily as needed for Anxiety. 30 tablet 1    valACYclovir 1 G Oral Tab Take 1 tablet (1,000 mg total) by mouth as needed. 2x 12 21 tablet 1    fluconazole (DIFLUCAN) 150 MG Oral Tab Take 1 tablet (150 mg total) by mouth every third day. 2 tablet 0    levothyroxine 75 MCG Oral Tab Take 1 tablet (75 mcg total) by mouth before breakfast. 90 tablet 1    Cranberry 475 MG Oral Cap Take by mouth.      cetirizine 10 MG Oral Tab Take 1 tablet (10 mg total) by mouth daily.      cholecalciferol 25 MCG (1000 UT) Oral Tab Take 1 tablet (1,000 Units total) by mouth daily.      losartan 50 MG Oral Tab TAKE 1 TABLET(50 MG) BY MOUTH TWICE DAILY 180 tablet 0    ketorolac 0.5 % Ophthalmic Solution       SYMBICORT 80-4.5 MCG/ACT Inhalation Aerosol Inhale 2 puffs into the lungs 2 (two) times daily.      fluticasone propionate 50 MCG/ACT Nasal Suspension 1 spray in each nostril Nasally Once a day as needed      montelukast 10 MG Oral Tab Take 1 tablet (10 mg total) by mouth nightly. 90 tablet 1    traZODone 150 MG Oral Tab Take 1 tablet (150 mg total) by mouth nightly as needed for Sleep. 90 tablet 1    CRANBERRY OR Take 15 mg by mouth  daily.      loratadine 10 MG Oral Tab Take 1 tablet (10 mg total) by mouth daily as needed for Allergies.      Lifitegrast (XIIDRA OP) Apply to eye 2 (two) times a day.      albuterol 108 (90 Base) MCG/ACT Inhalation Aero Soln Inhale 2 puffs into the lungs every 4 (four) hours as needed.      Ascorbic Acid (VITAMIN C) 100 MG Oral Tab Take 1 tablet (100 mg total) by mouth in the morning and 1 tablet (100 mg total) before bedtime.      zinc sulfate 220 (50 Zn) MG Oral Cap Take 25 mg by mouth daily.      Multiple Vitamins-Minerals (MULTI-VITAMIN/MINERALS) Oral Tab Take 1 tablet by mouth daily.       Allergies   Allergen Reactions    Epinephrine OTHER (SEE COMMENTS)    Cats Claw, Uncaria Tomentosa RASH    Dander RASH     Dogs      Dust Mites RASH    Grass RASH    Mold RASH     Current Outpatient Medications   Medication Sig Dispense Refill    Cyanocobalamin (B-12 OR) Take by mouth.      pantoprazole 40 MG Oral Tab EC Take 1 tablet (40 mg total) by mouth before breakfast. 90 tablet 1    ESTRADIOL 10 MCG Vaginal Tab INSERT 1 TABLET VAGINALLY 2 TIMES A WEEK 24 tablet 0    buPROPion  MG Oral Tablet 24 Hr TAKE 1 TABLET(300 MG) BY MOUTH DAILY 90 tablet 3    ALPRAZolam 0.5 MG Oral Tab Take 1 tablet (0.5 mg total) by mouth daily as needed for Anxiety. 30 tablet 1    valACYclovir 1 G Oral Tab Take 1 tablet (1,000 mg total) by mouth as needed. 2x 12 21 tablet 1    fluconazole (DIFLUCAN) 150 MG Oral Tab Take 1 tablet (150 mg total) by mouth every third day. 2 tablet 0    levothyroxine 75 MCG Oral Tab Take 1 tablet (75 mcg total) by mouth before breakfast. 90 tablet 1    Cranberry 475 MG Oral Cap Take by mouth.      cetirizine 10 MG Oral Tab Take 1 tablet (10 mg total) by mouth daily.      cholecalciferol 25 MCG (1000 UT) Oral Tab Take 1 tablet (1,000 Units total) by mouth daily.      losartan 50 MG Oral Tab TAKE 1 TABLET(50 MG) BY MOUTH TWICE DAILY 180 tablet 0    ketorolac 0.5 % Ophthalmic Solution       SYMBICORT 80-4.5  MCG/ACT Inhalation Aerosol Inhale 2 puffs into the lungs 2 (two) times daily.      fluticasone propionate 50 MCG/ACT Nasal Suspension 1 spray in each nostril Nasally Once a day as needed      montelukast 10 MG Oral Tab Take 1 tablet (10 mg total) by mouth nightly. 90 tablet 1    traZODone 150 MG Oral Tab Take 1 tablet (150 mg total) by mouth nightly as needed for Sleep. 90 tablet 1    CRANBERRY OR Take 15 mg by mouth daily.      loratadine 10 MG Oral Tab Take 1 tablet (10 mg total) by mouth daily as needed for Allergies.      Lifitegrast (XIIDRA OP) Apply to eye 2 (two) times a day.      albuterol 108 (90 Base) MCG/ACT Inhalation Aero Soln Inhale 2 puffs into the lungs every 4 (four) hours as needed.      Ascorbic Acid (VITAMIN C) 100 MG Oral Tab Take 1 tablet (100 mg total) by mouth in the morning and 1 tablet (100 mg total) before bedtime.      zinc sulfate 220 (50 Zn) MG Oral Cap Take 25 mg by mouth daily.      Multiple Vitamins-Minerals (MULTI-VITAMIN/MINERALS) Oral Tab Take 1 tablet by mouth daily.       Past Medical History:    Allergic rhinitis    Anxiety    Arthritis    Asthma (HCC)    Depression    Esophageal reflux    Fatty liver    2023 ultrasound of abdomen    Hyperlipidemia    Hyperthyroidism    Renal insufficiency     Past Surgical History:   Procedure Laterality Date    Colonoscopy      Hysterectomy      -          Other surgical history      Eye surgery    Tubal ligation       Social History     Socioeconomic History    Marital status:    Tobacco Use    Smoking status: Never     Passive exposure: Never    Smokeless tobacco: Never   Vaping Use    Vaping status: Never Used   Substance and Sexual Activity    Alcohol use: Yes     Alcohol/week: 3.0 - 4.0 standard drinks of alcohol     Types: 3 - 4 Standard drinks or equivalent per week     Comment: occ    Drug use: Never    Sexual activity: Yes     Birth control/protection: Hysterectomy   Other Topics Concern    Caffeine  Concern No    Exercise No    Seat Belt Yes    Special Diet No    Stress Concern Yes    Weight Concern No     Service No    Blood Transfusions No    Occupational Exposure No    Hobby Hazards No    Sleep Concern No    Back Care No    Bike Helmet No    Self-Exams No     Family History   Problem Relation Age of Onset    Diabetes Father     Stroke Father     Depression Father     Obesity Sister        REVIEW OF SYSTEMS: 10 point ROS completed, refer to HPI for pertinent positives    Objective:   PHYSICAL EXAMINATION:  Vital Signs:   Vitals:    07/02/24 1037   BP: 120/66   Pulse: 73      General Appearance:  Alert, in no acute distress, well developed  Eyes:  normal conjunctivae, sclera  Ears/Nose/Mouth/Throat/Neck:  normal hearing, normal speech and no palpable thyroid nodules  Respiratory:  breathing comfortably on room air, clear to auscultation bilaterally  Cardiovascular:  regular rate and rhythm, no murmurs, S3 or S4, no peripheral edema  Psychiatric:  Oriented to person, place and time, appropriate mood & affect  Skin: Normal moisture and skin texture  Neuro: sensory grossly intact, motor grossly intact. normal gait.    Recent Labs: Personally reviewed in [facility] under lab tab.  Interpretation: Negative SPEP, thyroid within range upper limit bone turnover markers  6/26/2024 creatinine 1.06, EGFR 60, calcium 9.3, albumin 3.6, SPEP without monoclonal protein, TSH 0.9 with free T4 1.1, , bone specific alkaline phosphatase 11.3   3/2024 vit d 65, ionized calcium 4.9, pth 60  2/2024 TSH 0.566, , ca9.2, egfr 68 with cr 0.96    Radiology:  Independently visualized on 5/20/2024  I reviewed the patient's records from outside facility which revealed: osteopenia    DXA Scans:  Date L2-L4 BMD T-score % change Mean Femoral Neck BMD T-score % change   4/24/2024 pf 1.075 0.3 0.664 -1.7   2/17/2023 pf 1.096 0.4 0.752 -0.9           ASSESSMENT/PLAN:  Julieta Goff is a 58 year old female seen in consultation  for:    1. Osteopenia of neck of femur, unspecified laterality  - Alkaline Phosphatase, Bone Specific; Future  - C-Telopeptide, Beta-Cross-Link; Future  - Renal Function Panel; Future  - VITAMIN D, 25-HYDROXY [52214][Q]; Future  Discussed pathophysiology of bone loss and clinical significance of DEXA scans with the patient.  The patient has confirmed osteopenia with low T-scores in the mean femoral neck. Diagnosed with right rib fractures 1/2024.  Explained the patient's risk factors for osteopenia include menopause at age 48, age, and SSRI hx. The patient is at high risk for future osteoporotic fractures if her osteoporosis remains untreated.  6/2024 secondary causes of osteoporosis, including SPEP, PTH, celiac screen, Alk Phos levels, and vitamin D levels WNL Will discuss next steps once labs result   6/2024 Cr 1.06; followed with nephrology and switched from OTC heartburn medication to PPI  Discussed the importance of adopting lifestyle measures, such as adequate calcium and vitamin D intake, exercise, smoking cessation, counseling on fall prevention, and avoidance of heavy alcohol use, to reduce bone loss in postmenopausal women.  Suggested 1200 mg of elemental calcium daily (total diet plus supplement) and 2000 IU of vitamin D daily   Recommended pharmacologic therapy for postmenopausal women with established osteoporosis or fragility fracture.  Discussed available treatment options for osteoporosis, including bisphosphonates (oral vs. IV), anabolics, Evenity, and Prolia injections, as well as their indications, risks, and benefits, including black box warnings.  Discussed concerns about an increased risk of vertebral fracture after discontinuation of denosumab, the need for indefinite administration of denosumab   We had an extensive discussion regarding the risks and benefits of therapy and the risks of no therapy.  We reviewed patient's FRAX score.  At this time patient would not like to start any  pharmacotherapy.  We discussed repeating labs prior to our follow-up visit to discuss if patient would like to start any pharmacologic therapy at that time.  Patient verbalized understanding and all questions were answered.  Recommended DXA every two years for patients starting on therapy, with additional evaluation for contributing factors if a clinically significant BMD decrease or new fracture occurs.       2. Hypothyroidism, unspecified type  - TSH and Free T4 [E]; Future   TFTs with TSH 0.566 2/2024 on LT4 75 mcg daily  Patient is compliant with current LT4 administration and is taking it appropriately   Discussed with pt the proper administration of LT4: ideally first thing in the morning on an empty stomach and taken only with water, separate from all other foods/beverages/medications by 30-60 minutes.    Repeat levels prior to follow up visit     The above assessment and plan was discussed with the patient. The patient noted understanding and agreement with the plan listed above.       Note to patient: The 21 Century Cures Act makes medical notes like these available to patients in the interest of transparency. However, be advised this is a medical document. It is intended as peer to peer communication. It is written in medical language and may contain abbreviations or verbiage that are unfamiliar. It may appear blunt or direct. Medical documents are intended to carry relevant information, facts as evident, and the clinical opinion of the practitioner      Beverley Munoz DO  Endocrinology, Diabetes & Metabolism   7/2/2024

## 2024-07-02 NOTE — PATIENT INSTRUCTIONS
Return Visit   [ x ] Physician in 6 months   [ x ] In person or video visit  [  ] In person only    [ x ] After visit summary   [  X ] Placed labs/imaging. Labs are to be drawn at 8A and fasting.      It was great seeing you today!    Today we discussed your osteopenia:  -We reviewed your history and your recent labs  -We discussed your risk if you choose not to pursue therapy (FRAX score)  -We planned on repeating labs in 6 months to discuss any change in therapy  -Please continue 1200 mg of calcium daily (supplement + diet)  -Please continue current vitamin D supplement (goal is above 30 for your vitamin d level)  -We discussed fall precautions  -We reviewed exercise goals     Take care!  -Dr. Munoz

## 2024-07-09 DIAGNOSIS — F33.1 MAJOR DEPRESSIVE DISORDER, RECURRENT EPISODE, MODERATE WITH ANXIOUS DISTRESS (HCC): ICD-10-CM

## 2024-07-09 DIAGNOSIS — F43.9 STRESS AT HOME: ICD-10-CM

## 2024-07-09 DIAGNOSIS — Z51.81 THERAPEUTIC DRUG MONITORING: ICD-10-CM

## 2024-07-09 RX ORDER — TRAZODONE HYDROCHLORIDE 150 MG/1
150 TABLET ORAL NIGHTLY PRN
Qty: 30 TABLET | Refills: 0 | OUTPATIENT
Start: 2024-07-09

## 2024-07-16 ENCOUNTER — PATIENT MESSAGE (OUTPATIENT)
Dept: FAMILY MEDICINE CLINIC | Facility: CLINIC | Age: 59
End: 2024-07-16

## 2024-07-17 DIAGNOSIS — Z51.81 THERAPEUTIC DRUG MONITORING: ICD-10-CM

## 2024-07-17 DIAGNOSIS — F33.1 MAJOR DEPRESSIVE DISORDER, RECURRENT EPISODE, MODERATE WITH ANXIOUS DISTRESS (HCC): ICD-10-CM

## 2024-07-17 DIAGNOSIS — I10 ESSENTIAL HYPERTENSION: ICD-10-CM

## 2024-07-17 DIAGNOSIS — F43.9 STRESS AT HOME: ICD-10-CM

## 2024-07-18 DIAGNOSIS — F43.9 STRESS AT HOME: ICD-10-CM

## 2024-07-18 DIAGNOSIS — Z51.81 THERAPEUTIC DRUG MONITORING: ICD-10-CM

## 2024-07-18 DIAGNOSIS — F33.1 MAJOR DEPRESSIVE DISORDER, RECURRENT EPISODE, MODERATE WITH ANXIOUS DISTRESS (HCC): ICD-10-CM

## 2024-07-18 DIAGNOSIS — I10 ESSENTIAL HYPERTENSION: ICD-10-CM

## 2024-07-18 RX ORDER — LOSARTAN POTASSIUM 50 MG/1
50 TABLET ORAL 2 TIMES DAILY
Qty: 180 TABLET | Refills: 0 | OUTPATIENT
Start: 2024-07-18

## 2024-07-18 RX ORDER — TRAZODONE HYDROCHLORIDE 150 MG/1
150 TABLET ORAL NIGHTLY PRN
Qty: 90 TABLET | Refills: 1 | OUTPATIENT
Start: 2024-07-18

## 2024-07-18 NOTE — TELEPHONE ENCOUNTER
Medication Quantity Refills Start End   traZODone 150 MG Oral Tab 90 tablet 1 8/17/2023 --   Sig:   Take 1 tablet (150 mg total) by mouth nightly as needed for Sleep.     Route:   Oral     PRN Reason(s):   Sleep     Order #:   [id number]       Medication Quantity Refills Start End   losartan 50 MG Oral Tab 180 tablet 0 2/12/2024 --   Sig:   TAKE 1 TABLET(50 MG) BY MOUTH TWICE DAILY     Route:   Oral     Order #:   [id number]       Last OV 5/2/24  Future Appointments   Date Time Provider Department Center   1/3/2025  1:30 PM Beverley Munoz DO [id number]    traZODone HCl     Dispensed Written Strength Quantity Refills Days Supply Provider Pharmacy   TRAZODONE 150MG (HUNDRED-FIFTY) TAB 06/14/2024 06/22/2023  30 each  30 [facility]...   TRAZODONE 150MG (HUNDRED-FIFTY) TAB 02/12/2024 02/01/2023  30 each  30 [facility]...   Losartan Potassium     Dispensed Written Strength Quantity Refills Days Supply Provider Pharmacy   LOSARTAN 50MG TABLETS 02/12/2024 02/12/2024  180 each  90 [facility]...     Prescriptions prescribed by previous pcp. Please advise

## 2024-07-18 NOTE — TELEPHONE ENCOUNTER
Patient calling, patient requesting refills on Losartan and Trazodone. Will be new prescription for PCP.

## 2024-07-18 NOTE — TELEPHONE ENCOUNTER
From: Julieta Goff  To: Tung Do  Sent: 7/16/2024 2:19 PM CDT  Subject: Bupropion    I would like to know if you could up my dosage of bupropion XL. I am taking 300 MG now. I have a lot going on in my life right now and I am talking to a therapist and would like to increase it for just a while to help get through things for now.

## 2024-07-19 RX ORDER — LOSARTAN POTASSIUM 50 MG/1
50 TABLET ORAL 2 TIMES DAILY
Qty: 180 TABLET | Refills: 0 | Status: SHIPPED | OUTPATIENT
Start: 2024-07-19

## 2024-07-19 RX ORDER — TRAZODONE HYDROCHLORIDE 150 MG/1
150 TABLET ORAL NIGHTLY PRN
Qty: 90 TABLET | Refills: 0 | Status: SHIPPED | OUTPATIENT
Start: 2024-07-19

## 2024-08-28 DIAGNOSIS — E03.9 ACQUIRED HYPOTHYROIDISM: ICD-10-CM

## 2024-08-28 RX ORDER — LEVOTHYROXINE SODIUM 75 UG/1
75 TABLET ORAL
Qty: 90 TABLET | Refills: 1 | Status: SHIPPED | OUTPATIENT
Start: 2024-08-28

## 2024-09-07 DIAGNOSIS — R12 HEARTBURN: ICD-10-CM

## 2024-09-09 NOTE — TELEPHONE ENCOUNTER
Requested Prescriptions     Pending Prescriptions Disp Refills    pantoprazole 40 MG Oral Tab EC 90 tablet 1     Sig: Take 1 tablet (40 mg total) by mouth before breakfast.     Last seen: 9/19/23  Suggested follow up:  Last refill: 5/24/24    Refill pended, please review/sign if agreeable.

## 2024-09-10 RX ORDER — PANTOPRAZOLE SODIUM 40 MG/1
40 TABLET, DELAYED RELEASE ORAL
Qty: 90 TABLET | Refills: 1 | Status: SHIPPED | OUTPATIENT
Start: 2024-09-10

## 2024-09-10 RX ORDER — ESTRADIOL 10 UG/1
10 INSERT VAGINAL
Qty: 24 TABLET | Refills: 0 | Status: SHIPPED | OUTPATIENT
Start: 2024-09-10 | End: 2024-09-13

## 2024-09-10 RX ORDER — ESTRADIOL 10 UG/1
INSERT VAGINAL
Qty: 25 TABLET | Refills: 0 | OUTPATIENT
Start: 2024-09-10

## 2024-09-10 NOTE — TELEPHONE ENCOUNTER
Refilled earlier today. Duplicate request.    Requested Prescriptions     Refused Prescriptions Disp Refills    ESTRADIOL 10 MCG Vaginal Tab [Pharmacy Med Name: ESTRADIOL 10MCG VAGINAL TABS 8S] 25 tablet 0     Sig: INSERT 1 TABLET VAGINALLY 2 TIMES A WEEK     Refused By: MOSES RODRIGUEZ     Reason for Refusal: Duplicate refill request

## 2024-09-10 NOTE — TELEPHONE ENCOUNTER
Requested Prescriptions     Signed Prescriptions Disp Refills    Estradiol 10 MCG Vaginal Tab 24 tablet 0     Sig: Place 10 mcg vaginally twice a week.     Authorizing Provider: SONIA HELMS     Ordering User: MOSES RODRIGUEZ        Refilled per protocol/OV notes

## 2024-09-11 ENCOUNTER — TELEPHONE (OUTPATIENT)
Facility: CLINIC | Age: 59
End: 2024-09-11

## 2024-09-11 NOTE — TELEPHONE ENCOUNTER
Current Outpatient Medications   Medication Sig Dispense Refill    pantoprazole 40 MG Oral Tab EC Take 1 tablet (40 mg total) by mouth before breakfast. 90 tablet 1         Key: [id number]

## 2024-09-11 NOTE — TELEPHONE ENCOUNTER
Initiated prior authorization thru cover my meds.    JOSE HAYES (Anaya: [id number]) - [id number]  Pantoprazole Sodium 40MG dr tablets  status: PA RequestCreated: September 11th, 2024 [phone]: September 11th, 2024    Sent Upper endoscopy operative and pathology report from 10/30/2023 and virtual visit from 12/29/2023

## 2024-09-13 ENCOUNTER — PATIENT MESSAGE (OUTPATIENT)
Dept: FAMILY MEDICINE CLINIC | Facility: CLINIC | Age: 59
End: 2024-09-13

## 2024-09-13 ENCOUNTER — TELEPHONE (OUTPATIENT)
Dept: FAMILY MEDICINE CLINIC | Facility: CLINIC | Age: 59
End: 2024-09-13

## 2024-09-13 NOTE — TELEPHONE ENCOUNTER
JOSE HAYES (Anaya: [id number]) - [id number]  Pantoprazole Sodium 40MG dr tablets  status: PA Response - ApprovedCreated: September 11th, 2024 [phone]: September 11th, 2024    Patient notified of approval via [employer]

## 2024-09-13 NOTE — TELEPHONE ENCOUNTER
Patient received call from pharmacy that authorization was needed in regards to the Estradiol. Upon looking in her chart, the medication was sent to Dr. Glynn by the pharmacy.   Patient would like for Dr. Do to prescribe this medication. She will also call the other office to have the prescription cancelled.

## 2024-09-13 NOTE — TELEPHONE ENCOUNTER
Patient would like Estradiol 10 MCG Vaginal Tab medication to be removed, patient no longer sees Dr. Ely Glynn. Unable to get medication because of Prior Authorization.

## 2024-09-13 NOTE — TELEPHONE ENCOUNTER
LOV 5/2/2024 for follow up broken ribs    Requesting     Disp Refills Start End    Estradiol 10 MCG Vaginal Tab 24 tablet 0 9/10/2024 --    Sig - Route: Place 10 mcg vaginally twice a week. - Vaginal    Sent to pharmacy as: Estradiol 10 MCG Vaginal Tablet    Notes to Pharmacy: ZERO refills remain on this prescription. Your patient is requesting advance approval of refills for this medication to PREVENT ANY MISSED DOSES    E-Prescribing Status: Receipt confirmed by pharmacy (9/10/2024  9:31 AM CDT)      Dispensed     Dispensed Written Strength Quantity Refills Days Supply Provider Pharmacy   ESTRADIOL 10MCG VAGINAL TABS 8S 05/29/2024 05/10/2024  24 each  84 Ely Glynn DO [facility] #...     Are you ok with taking over this prescription?

## 2024-09-16 ENCOUNTER — PATIENT MESSAGE (OUTPATIENT)
Facility: CLINIC | Age: 59
End: 2024-09-16

## 2024-09-16 DIAGNOSIS — R79.89 ELEVATED SERUM CREATININE: Primary | ICD-10-CM

## 2024-09-16 NOTE — TELEPHONE ENCOUNTER
From: Agnes SILVEIRA  To: Julieta Kesha [place]  Sent: 9/13/2024 1:14 PM CDT  Subject: Removal    Julieta,  The requested medication was removed from your chart. Please feel free to reach out with any further questions/concerns. Thanks! Anjali KENT

## 2024-09-17 ENCOUNTER — TELEPHONE (OUTPATIENT)
Dept: FAMILY MEDICINE CLINIC | Facility: CLINIC | Age: 59
End: 2024-09-17

## 2024-09-17 DIAGNOSIS — Z90.710 HISTORY OF HYSTERECTOMY: ICD-10-CM

## 2024-09-17 DIAGNOSIS — Z78.0 POSTMENOPAUSAL: Primary | ICD-10-CM

## 2024-09-17 RX ORDER — ESTRADIOL 10 UG/1
10 INSERT VAGINAL
Qty: 24 TABLET | Refills: 0 | Status: SHIPPED | OUTPATIENT
Start: 2024-09-17

## 2024-09-17 NOTE — TELEPHONE ENCOUNTER
From: Julieta Goff  To: Charmaine Gorman  Sent: 9/16/2024 4:01 PM CDT  Subject: Blood test    Hi, I have been taking the pantoprazole for about 6 months now. I believe you wanted me to get a blood test to check kidneys. Can you please place an order?

## 2024-09-17 NOTE — TELEPHONE ENCOUNTER
See TE 9/13/24:    Tung Do DO         9/16/24 12:51 PM  Note     Okay to send refill 90 day supply. Establish with gyn for further refills.        Rx sent for #90. MCM sent to patient to establish with gyne.

## 2024-09-17 NOTE — TELEPHONE ENCOUNTER
Patient will need a new prescription for the Estradiol 10mcg inserts. This was prescribed by previous doctor. Please send to local [facility].

## 2024-09-18 ENCOUNTER — TELEMEDICINE (OUTPATIENT)
Dept: FAMILY MEDICINE CLINIC | Facility: CLINIC | Age: 59
End: 2024-09-18
Payer: MEDICAID

## 2024-09-18 DIAGNOSIS — Z91.89 AT RISK FOR SEXUALLY TRANSMITTED DISEASE DUE TO PARTNER WITH MULTIPLE PARTNERS: Primary | ICD-10-CM

## 2024-09-18 PROCEDURE — 99213 OFFICE O/P EST LOW 20 MIN: CPT | Performed by: FAMILY MEDICINE

## 2024-09-18 NOTE — PROGRESS NOTES
This visit is conducted using Telemedicine with live, interactive video and audio.    Patient has been referred to the [facility] website at [email or web address] to review the yearly Consent to Treat document.    Patient understands and accepts financial responsibility for any deductible, co-insurance and/or co-pays associated with this service.           I conducted a telehealth visit with Julieta Goff today, 09/18/24, which was completed using two-way, real-time interactive audio and video communication.  CHIEF COMPLAINT:   STI check  HPI:   Julieta Goff is a 59 year old female who presents for a video visit.  Patient reports her partner cheated on her with other people. Patient would like to have full STI check up.  Patient denies any symptoms of STI.      Current Outpatient Medications   Medication Sig Dispense Refill    Estradiol 10 MCG Vaginal Tab Place 10 mcg vaginally twice a week. 24 tablet 0    pantoprazole 40 MG Oral Tab EC Take 1 tablet (40 mg total) by mouth before breakfast. 90 tablet 1    LEVOTHYROXINE 75 MCG Oral Tab TAKE 1 TABLET(75 MCG) BY MOUTH BEFORE BREAKFAST 90 tablet 1    losartan 50 MG Oral Tab Take 1 tablet (50 mg total) by mouth 2 (two) times daily. 180 tablet 0    traZODone 150 MG Oral Tab Take 1 tablet (150 mg total) by mouth nightly as needed for Sleep. 90 tablet 0    Cyanocobalamin (B-12 OR) Take by mouth.      buPROPion  MG Oral Tablet 24 Hr TAKE 1 TABLET(300 MG) BY MOUTH DAILY 90 tablet 3    ALPRAZolam 0.5 MG Oral Tab Take 1 tablet (0.5 mg total) by mouth daily as needed for Anxiety. 30 tablet 1    valACYclovir 1 G Oral Tab Take 1 tablet (1,000 mg total) by mouth as needed. 2x 12 21 tablet 1    fluconazole (DIFLUCAN) 150 MG Oral Tab Take 1 tablet (150 mg total) by mouth every third day. 2 tablet 0    Cranberry 475 MG Oral Cap Take by mouth.      cetirizine 10 MG Oral Tab Take 1 tablet (10 mg total) by mouth daily.      cholecalciferol 25 MCG (1000 UT) Oral Tab Take 1  tablet (1,000 Units total) by mouth daily.      ketorolac 0.5 % Ophthalmic Solution       SYMBICORT 80-4.5 MCG/ACT Inhalation Aerosol Inhale 2 puffs into the lungs 2 (two) times daily.      fluticasone propionate 50 MCG/ACT Nasal Suspension 1 spray in each nostril Nasally Once a day as needed      montelukast 10 MG Oral Tab Take 1 tablet (10 mg total) by mouth nightly. 90 tablet 1    CRANBERRY OR Take 15 mg by mouth daily.      loratadine 10 MG Oral Tab Take 1 tablet (10 mg total) by mouth daily as needed for Allergies.      Lifitegrast (XIIDRA OP) Apply to eye 2 (two) times a day.      albuterol 108 (90 Base) MCG/ACT Inhalation Aero Soln Inhale 2 puffs into the lungs every 4 (four) hours as needed.      Ascorbic Acid (VITAMIN C) 100 MG Oral Tab Take 1 tablet (100 mg total) by mouth in the morning and 1 tablet (100 mg total) before bedtime.      zinc sulfate 220 (50 Zn) MG Oral Cap Take 25 mg by mouth daily.      Multiple Vitamins-Minerals (MULTI-VITAMIN/MINERALS) Oral Tab Take 1 tablet by mouth daily.        Past Medical History:    Allergic rhinitis    Anxiety    Arthritis    Asthma (HCC)    Depression    Esophageal reflux    Fatty liver    2023 ultrasound of abdomen    Hyperlipidemia    Hyperthyroidism    Renal insufficiency      Past Surgical History:   Procedure Laterality Date    Colonoscopy      Hysterectomy      -          Other surgical history      Eye surgery    Tubal ligation           Social History     Socioeconomic History    Marital status:    Tobacco Use    Smoking status: Never     Passive exposure: Never    Smokeless tobacco: Never   Vaping Use    Vaping status: Never Used   Substance and Sexual Activity    Alcohol use: Yes     Alcohol/week: 3.0 - 4.0 standard drinks of alcohol     Types: 3 - 4 Standard drinks or equivalent per week     Comment: occ    Drug use: Never    Sexual activity: Yes     Birth control/protection: Hysterectomy   Other Topics Concern     Caffeine Concern No    Exercise No    Seat Belt Yes    Special Diet No    Stress Concern Yes    Weight Concern No     Service No    Blood Transfusions No    Occupational Exposure No    Hobby Hazards No    Sleep Concern No    Back Care No    Bike Helmet No    Self-Exams No         REVIEW OF SYSTEMS:   GENERAL: normal appetite  SKIN: no rashes or abnormal skin lesions  HEENT: See HPI  LUNGS: denies shortness of breath or wheezing, See HPI  CARDIOVASCULAR: denies chest pain or palpitations   GI: denies N/V/C or abdominal pain  NEURO: Denies headaches    EXAM:   General: Alert  Respiratory:   Speaking in full sentences comfortably  Normal work of breathing  No cough during visit  Head: Normocephalic  Nose: No obvious nasal discharge.  Skin: No obvious rashes or lesions from what observed.     No results found for this or any previous visit (from the past 24 hour(s)).    ASSESSMENT AND PLAN:   Julieta Goff is a 59 year old female who presents with symptoms that are consistent with    ASSESSMENT:   Encounter Diagnosis   Name Primary?    At risk for sexually transmitted disease due to partner with multiple partners Yes         PLAN:  See patient Instructions, Labs as ordered, Repeat 3 months.     Meds & Refills for this Visit:  Requested Prescriptions      No prescriptions requested or ordered in this encounter     - T Pallidum Screening [place]; Future  - HIV AG AB Combo (Consent Obtained prechecked); Future  - Urine Chlamydia/GC Amplification; Future  - Hepatitis B Surface Antibody; Future  - Hepatitis B Surface Antigen; Future  - HCV Antibody; Future    Repeat 3 months:   - T Pallidum Screening [place]; Future  - HIV AG AB Combo (Consent Obtained prechecked); Future  - Urine Chlamydia/GC Amplification; Future  - Hepatitis B Surface Antibody; Future  - Hepatitis B Surface Antigen; Future  - HCV Antibody; Future  Risks, benefits, and side effects of medication explained and discussed.    The patient indicates  understanding of these issues and agrees to the plan.  The patient is asked to return if sx's persist or worsen.    Face to face time spent on Video Visit: 10  Total Time spent on visit including reviewing history, ordering labs/medication, patient examination and education: 5    Julieta Goff understands video visit evaluation is not a substitute for face-to-face examination or emergency care. Patient advised to go to ER or call 911 for worsening symptoms or acute distress.      Included in this visit, time may have been spent reviewing labs, medications, radiology tests and decision making.

## 2024-09-23 ENCOUNTER — TELEPHONE (OUTPATIENT)
Facility: CLINIC | Age: 59
End: 2024-09-23

## 2024-09-24 NOTE — TELEPHONE ENCOUNTER
Jamie TIRADO    Called and spoke to the patient, date of birth and name verified.    She stated she canceled the cologuard request. She is not due until April 2025.    Previous cologuard 4/14/2022.    Updated Care Everywhere and placed recall in Pt Outreach.    Thank you

## 2024-09-24 NOTE — TELEPHONE ENCOUNTER
Chart reviewed, she is correct, cologuard due April 2025.   Please place recall. Thank you    BRENT Zapata

## 2024-09-26 ENCOUNTER — TELEPHONE (OUTPATIENT)
Dept: FAMILY MEDICINE CLINIC | Facility: CLINIC | Age: 59
End: 2024-09-26

## 2024-09-26 NOTE — TELEPHONE ENCOUNTER
Received incoming fax for prior authoriztion for    Disp Refills Start End    Estradiol 10 MCG Vaginal Tab 24 tablet 0 9/17/2024 --    Sig - Route: Place 10 mcg vaginally twice a week. - Vaginal    Sent to pharmacy as: Estradiol 10 MCG Vaginal Tablet    Notes to Pharmacy: ZERO refills remain on this prescription. Your patient is requesting advance approval of refills for this medication to PREVENT ANY MISSED DOSES        Key:[id number]

## 2024-09-30 ENCOUNTER — LAB ENCOUNTER (OUTPATIENT)
Dept: LAB | Age: 59
End: 2024-09-30
Attending: FAMILY MEDICINE
Payer: MEDICAID

## 2024-09-30 DIAGNOSIS — R79.89 ELEVATED SERUM CREATININE: ICD-10-CM

## 2024-09-30 DIAGNOSIS — Z91.89 AT RISK FOR SEXUALLY TRANSMITTED DISEASE DUE TO PARTNER WITH MULTIPLE PARTNERS: ICD-10-CM

## 2024-09-30 LAB
ALBUMIN SERPL-MCNC: 4.1 G/DL (ref 3.2–4.8)
ALBUMIN/GLOB SERPL: 1.5 {RATIO} (ref 1–2)
ALP LIVER SERPL-CCNC: 68 U/L
ALT SERPL-CCNC: 9 U/L
ANION GAP SERPL CALC-SCNC: 5 MMOL/L (ref 0–18)
AST SERPL-CCNC: 16 U/L (ref ?–34)
BILIRUB SERPL-MCNC: 0.7 MG/DL (ref 0.2–1.1)
BUN BLD-MCNC: 14 MG/DL (ref 9–23)
CALCIUM BLD-MCNC: 9.5 MG/DL (ref 8.7–10.4)
CHLORIDE SERPL-SCNC: 106 MMOL/L (ref 98–112)
CO2 SERPL-SCNC: 29 MMOL/L (ref 21–32)
CREAT BLD-MCNC: 1.02 MG/DL
EGFRCR SERPLBLD CKD-EPI 2021: 63 ML/MIN/1.73M2 (ref 60–?)
FASTING STATUS PATIENT QL REPORTED: NO
GLOBULIN PLAS-MCNC: 2.8 G/DL (ref 2–3.5)
GLUCOSE BLD-MCNC: 115 MG/DL (ref 70–99)
HBV SURFACE AB SER QL: REACTIVE
HBV SURFACE AB SERPL IA-ACNC: 264.64 MIU/ML
HBV SURFACE AG SER-ACNC: 0.14 [IU]/L
HBV SURFACE AG SERPL QL IA: NONREACTIVE
HCV AB SERPL QL IA: NONREACTIVE
OSMOLALITY SERPL CALC.SUM OF ELEC: 291 MOSM/KG (ref 275–295)
POTASSIUM SERPL-SCNC: 4 MMOL/L (ref 3.5–5.1)
PROT SERPL-MCNC: 6.9 G/DL (ref 5.7–8.2)
SODIUM SERPL-SCNC: 140 MMOL/L (ref 136–145)
T PALLIDUM AB SER QL IA: NONREACTIVE

## 2024-09-30 PROCEDURE — 87389 HIV-1 AG W/HIV-1&-2 AB AG IA: CPT

## 2024-09-30 PROCEDURE — 87491 CHLMYD TRACH DNA AMP PROBE: CPT

## 2024-09-30 PROCEDURE — 87591 N.GONORRHOEAE DNA AMP PROB: CPT

## 2024-09-30 PROCEDURE — 87340 HEPATITIS B SURFACE AG IA: CPT

## 2024-09-30 PROCEDURE — 80053 COMPREHEN METABOLIC PANEL: CPT

## 2024-09-30 PROCEDURE — 86780 TREPONEMA PALLIDUM: CPT

## 2024-09-30 PROCEDURE — 86706 HEP B SURFACE ANTIBODY: CPT

## 2024-09-30 PROCEDURE — 86803 HEPATITIS C AB TEST: CPT

## 2024-09-30 PROCEDURE — 36415 COLL VENOUS BLD VENIPUNCTURE: CPT

## 2024-10-01 LAB
C TRACH DNA SPEC QL NAA+PROBE: NEGATIVE
N GONORRHOEA DNA SPEC QL NAA+PROBE: NEGATIVE

## 2024-10-18 ENCOUNTER — TELEPHONE (OUTPATIENT)
Facility: CLINIC | Age: 59
End: 2024-10-18

## 2024-10-18 NOTE — TELEPHONE ENCOUNTER
Phoned patient to move appointment time on 1/3/24 from 1:30p --> 1:45p, no answer, left detailed message reviewing change. Lineagen message sent as well.    Closing this encounter.

## 2024-10-25 DIAGNOSIS — F43.9 STRESS AT HOME: ICD-10-CM

## 2024-10-25 DIAGNOSIS — Z51.81 THERAPEUTIC DRUG MONITORING: ICD-10-CM

## 2024-10-25 DIAGNOSIS — F33.1 MAJOR DEPRESSIVE DISORDER, RECURRENT EPISODE, MODERATE WITH ANXIOUS DISTRESS (HCC): ICD-10-CM

## 2024-10-27 DIAGNOSIS — I10 ESSENTIAL HYPERTENSION: ICD-10-CM

## 2024-10-28 RX ORDER — LOSARTAN POTASSIUM 50 MG/1
50 TABLET ORAL 2 TIMES DAILY
Qty: 180 TABLET | Refills: 0 | Status: SHIPPED | OUTPATIENT
Start: 2024-10-28

## 2024-10-28 NOTE — TELEPHONE ENCOUNTER
Requested Prescriptions     Pending Prescriptions Disp Refills    LOSARTAN 50 MG Oral Tab [Pharmacy Med Name: LOSARTAN 50MG TABLETS] 180 tablet 0     Sig: TAKE 1 TABLET(50 MG) BY MOUTH TWICE DAILY       LOV: 5/2/24  RTC:   Last Relevant Labs: 2/27/24  Filled: 7/19/24 #180 with 0 refills    Future Appointments   Date Time Provider Department Center   1/3/2025  1:45 PM Beverley Munoz DO [id number] [facility] [place]

## 2024-10-28 NOTE — TELEPHONE ENCOUNTER
Requesting Trazodone 150mg  Last OV: 9/18/24 Telemedicine  RTC: prn  Last Rx'd 7/19/24 #90 with 0 refills    Future Appointments   Date Time Provider Department Center   1/3/2025  1:45 PM Beverley Munoz DO [id number] [facility] [place]       Non-protocol med:  Rx pended and routed for approval/denial

## 2024-10-29 ENCOUNTER — TELEPHONE (OUTPATIENT)
Dept: FAMILY MEDICINE CLINIC | Facility: CLINIC | Age: 59
End: 2024-10-29

## 2024-10-29 NOTE — TELEPHONE ENCOUNTER
Requesting Ibuprofen 600mg  Last OV: 9/18/24 Telemedicine  RTC: prn  Last Rx'd 4/1/24 #40 with 0 refills    Future Appointments   Date Time Provider Department Center   1/3/2025  1:45 PM Beverley Munoz DO [id number] [facility] [place]       Non-protocol med:  Rx pended and routed for approval/denial

## 2024-10-31 RX ORDER — IBUPROFEN 600 MG/1
600 TABLET, FILM COATED ORAL EVERY 6 HOURS PRN
Qty: 40 TABLET | Refills: 0 | Status: SHIPPED | OUTPATIENT
Start: 2024-10-31

## 2024-10-31 RX ORDER — TRAZODONE HYDROCHLORIDE 150 MG/1
150 TABLET ORAL NIGHTLY PRN
Qty: 90 TABLET | Refills: 1 | Status: SHIPPED | OUTPATIENT
Start: 2024-10-31

## 2024-11-10 DIAGNOSIS — F33.1 MAJOR DEPRESSIVE DISORDER, RECURRENT EPISODE, MODERATE WITH ANXIOUS DISTRESS (HCC): ICD-10-CM

## 2024-11-10 DIAGNOSIS — F43.9 STRESS AT HOME: ICD-10-CM

## 2024-11-10 DIAGNOSIS — Z51.81 THERAPEUTIC DRUG MONITORING: ICD-10-CM

## 2024-11-11 RX ORDER — ALPRAZOLAM 0.5 MG
0.5 TABLET ORAL
Qty: 30 TABLET | Refills: 2 | Status: SHIPPED | OUTPATIENT
Start: 2024-11-11

## 2024-11-11 NOTE — TELEPHONE ENCOUNTER
Requesting Alprazolam 0.5mg  Last OV: 5/2/24  RTC: not noted  Last Rx'd 5/2/24 #30 with 1 refill    Future Appointments   Date Time Provider Department Center   1/3/2025  1:45 PM Beverley Munoz DO [id number] [facility] [place]         Controlled med:  Rx pended and routed for approval/denial

## 2024-11-26 ENCOUNTER — TELEPHONE (OUTPATIENT)
Dept: FAMILY MEDICINE CLINIC | Facility: CLINIC | Age: 59
End: 2024-11-26

## 2024-11-26 DIAGNOSIS — Z12.31 ENCOUNTER FOR SCREENING MAMMOGRAM FOR BREAST CANCER: Primary | ICD-10-CM

## 2024-11-26 NOTE — TELEPHONE ENCOUNTER
Patient wants to know if she is due for a mammogram.     Please advise.     Patient asked if she does not answer if a message can be left.

## 2024-11-26 NOTE — TELEPHONE ENCOUNTER
Last Mammogram 12/8/23  mpression   CONCLUSION:  No mammographic evidence of malignancy.     BI-RADS CATEGORY:    DIAGNOSTIC CATEGORY 2--BENIGN FINDING NO CHANGE FROM COMPARISON ASSESSMENT.       RECOMMENDATIONS:    ROUTINE MAMMOGRAM AND CLINICAL EVALUATION IN 12 MONTHS.       Order placed

## 2024-12-02 DIAGNOSIS — J30.1 ALLERGIC RHINITIS DUE TO POLLEN, UNSPECIFIED SEASONALITY: ICD-10-CM

## 2024-12-02 DIAGNOSIS — J45.40 MODERATE PERSISTENT ASTHMA WITHOUT COMPLICATION (HCC): ICD-10-CM

## 2024-12-02 NOTE — TELEPHONE ENCOUNTER
Patient is requesting a new rx for medication prescribed by prior Primary Care Provider.     Disp Refills Start End    montelukast 10 MG Oral Tab 90 tablet 1 8/17/2023 --    Sig - Route: Take 1 tablet (10 mg total) by mouth nightly. - Oral    Sent to pharmacy as: Montelukast Sodium 10 MG Oral Tablet (Singulair)

## 2024-12-03 ENCOUNTER — TELEPHONE (OUTPATIENT)
Dept: OBGYN CLINIC | Facility: CLINIC | Age: 59
End: 2024-12-03

## 2024-12-03 DIAGNOSIS — N76.0 VAGINITIS AND VULVOVAGINITIS: ICD-10-CM

## 2024-12-03 RX ORDER — FLUCONAZOLE 150 MG/1
150 TABLET ORAL ONCE
Qty: 2 TABLET | Refills: 0 | Status: SHIPPED | OUTPATIENT
Start: 2024-12-03 | End: 2024-12-03

## 2024-12-03 NOTE — TELEPHONE ENCOUNTER
Patient is having vaginal itchiness and irritation. She would like Fluconazole sent to pharmacy.  Please call to advise

## 2024-12-03 NOTE — TELEPHONE ENCOUNTER
Requested Prescriptions     Pending Prescriptions Disp Refills    montelukast 10 MG Oral Tab 90 tablet 1     Sig: Take 1 tablet (10 mg total) by mouth nightly.     LOV: 5/2/24  RTC:   Last Relevant Labs: 2/27/24  Filled: 8/17/23 #90 with 1 refills    Future Appointments   Date Time Provider Department Center   12/28/2024 10:45 AM REF SO PFLD REF [id number] Ref PFLD 17   12/28/2024 11:20 AM [facility]   1/3/2025  1:45 PM Beverley Munoz DO [id number] [facility] [place]

## 2024-12-03 NOTE — TELEPHONE ENCOUNTER
TweetPhoto message sent to notify of prescription and dosing instructions. Patient to schedule office visit if s/s persist / worsen.

## 2024-12-03 NOTE — TELEPHONE ENCOUNTER
vaginal itchiness and irritation began 12/2. Denies odor and discharge. Patient notes using different toilet paper. Denies changes to medications, supplements, or diet.   Requesting prescription diflucan to pharmacy. LOV 6/11/24  Order pended for review  Please advise

## 2024-12-06 RX ORDER — MONTELUKAST SODIUM 10 MG/1
10 TABLET ORAL NIGHTLY
Qty: 90 TABLET | Refills: 0 | Status: SHIPPED | OUTPATIENT
Start: 2024-12-06

## 2024-12-14 DIAGNOSIS — E03.9 ACQUIRED HYPOTHYROIDISM: ICD-10-CM

## 2024-12-16 RX ORDER — LEVOTHYROXINE SODIUM 75 UG/1
75 TABLET ORAL
Qty: 90 TABLET | Refills: 0 | Status: SHIPPED | OUTPATIENT
Start: 2024-12-16

## 2024-12-16 NOTE — TELEPHONE ENCOUNTER
Requested Prescriptions     Pending Prescriptions Disp Refills    LEVOTHYROXINE 75 MCG Oral Tab [Pharmacy Med Name: LEVOTHYROXINE 0.075MG (75MCG) TABS] 90 tablet 1     Sig: TAKE 1 TABLET(75 MCG) BY MOUTH BEFORE BREAKFAST     Patient sees endo     Future Appointments   Date Time Provider Department Center   12/28/2024 10:45 AM REF SO PFLD REF [facility]17 Ref PFLD 17   12/28/2024 11:20 AM [facility]   1/3/2025  1:45 PM Beverley Munoz DO [id number] [facility] [place]

## 2024-12-19 ENCOUNTER — LAB ENCOUNTER (OUTPATIENT)
Dept: LAB | Age: 59
End: 2024-12-19
Attending: FAMILY MEDICINE
Payer: MEDICAID

## 2024-12-19 DIAGNOSIS — E03.9 HYPOTHYROIDISM, UNSPECIFIED TYPE: ICD-10-CM

## 2024-12-19 DIAGNOSIS — Z91.89 AT RISK FOR SEXUALLY TRANSMITTED DISEASE DUE TO PARTNER WITH MULTIPLE PARTNERS: ICD-10-CM

## 2024-12-19 DIAGNOSIS — M85.859 OSTEOPENIA OF NECK OF FEMUR, UNSPECIFIED LATERALITY: ICD-10-CM

## 2024-12-19 LAB
ALBUMIN SERPL-MCNC: 4 G/DL (ref 3.2–4.8)
ANION GAP SERPL CALC-SCNC: 6 MMOL/L (ref 0–18)
BUN BLD-MCNC: 14 MG/DL (ref 9–23)
CALCIUM BLD-MCNC: 9.5 MG/DL (ref 8.7–10.4)
CHLORIDE SERPL-SCNC: 104 MMOL/L (ref 98–112)
CO2 SERPL-SCNC: 31 MMOL/L (ref 21–32)
CREAT BLD-MCNC: 0.94 MG/DL
EGFRCR SERPLBLD CKD-EPI 2021: 69 ML/MIN/1.73M2 (ref 60–?)
GLUCOSE BLD-MCNC: 85 MG/DL (ref 70–99)
OSMOLALITY SERPL CALC.SUM OF ELEC: 292 MOSM/KG (ref 275–295)
PHOSPHATE SERPL-MCNC: 3.5 MG/DL (ref 2.4–5.1)
POTASSIUM SERPL-SCNC: 4.1 MMOL/L (ref 3.5–5.1)
SODIUM SERPL-SCNC: 141 MMOL/L (ref 136–145)
T4 FREE SERPL-MCNC: 1.3 NG/DL (ref 0.8–1.7)
TSI SER-ACNC: 1.38 UIU/ML (ref 0.55–4.78)
VIT D+METAB SERPL-MCNC: 62.8 NG/ML (ref 30–100)

## 2024-12-19 PROCEDURE — 80069 RENAL FUNCTION PANEL: CPT

## 2024-12-19 PROCEDURE — 84443 ASSAY THYROID STIM HORMONE: CPT

## 2024-12-19 PROCEDURE — 84439 ASSAY OF FREE THYROXINE: CPT

## 2024-12-19 PROCEDURE — 36415 COLL VENOUS BLD VENIPUNCTURE: CPT

## 2024-12-19 PROCEDURE — 82306 VITAMIN D 25 HYDROXY: CPT

## 2024-12-19 PROCEDURE — 84080 ASSAY ALKALINE PHOSPHATASES: CPT

## 2024-12-19 PROCEDURE — 82523 COLLAGEN CROSSLINKS: CPT

## 2024-12-23 LAB — C-TELOPEPTIDE: 599 PG/ML

## 2024-12-24 LAB — ALKALINE PHOSPHATASE BONE SPECIFIC: 11.1 UG/L

## 2024-12-28 ENCOUNTER — LAB ENCOUNTER (OUTPATIENT)
Dept: LAB | Age: 59
End: 2024-12-28
Attending: FAMILY MEDICINE
Payer: MEDICAID

## 2024-12-28 DIAGNOSIS — Z91.89 AT RISK FOR SEXUALLY TRANSMITTED DISEASE DUE TO PARTNER WITH MULTIPLE PARTNERS: ICD-10-CM

## 2024-12-28 LAB
HBV SURFACE AB SER QL: REACTIVE
HBV SURFACE AB SERPL IA-ACNC: 296.26 MIU/ML
HBV SURFACE AG SER-ACNC: <0.1 [IU]/L
HBV SURFACE AG SERPL QL IA: NONREACTIVE
HCV AB SERPL QL IA: NONREACTIVE
T PALLIDUM AB SER QL IA: NONREACTIVE

## 2024-12-28 PROCEDURE — 87591 N.GONORRHOEAE DNA AMP PROB: CPT

## 2024-12-28 PROCEDURE — 87389 HIV-1 AG W/HIV-1&-2 AB AG IA: CPT

## 2024-12-28 PROCEDURE — 86706 HEP B SURFACE ANTIBODY: CPT

## 2024-12-28 PROCEDURE — 87340 HEPATITIS B SURFACE AG IA: CPT

## 2024-12-28 PROCEDURE — 36415 COLL VENOUS BLD VENIPUNCTURE: CPT

## 2024-12-28 PROCEDURE — 86780 TREPONEMA PALLIDUM: CPT

## 2024-12-28 PROCEDURE — 87491 CHLMYD TRACH DNA AMP PROBE: CPT

## 2024-12-29 DIAGNOSIS — Z78.0 POSTMENOPAUSAL: ICD-10-CM

## 2024-12-29 DIAGNOSIS — Z90.710 HISTORY OF HYSTERECTOMY: ICD-10-CM

## 2024-12-30 DIAGNOSIS — Z90.710 HISTORY OF HYSTERECTOMY: ICD-10-CM

## 2024-12-30 DIAGNOSIS — Z78.0 POSTMENOPAUSAL: ICD-10-CM

## 2024-12-30 LAB
C TRACH DNA SPEC QL NAA+PROBE: NEGATIVE
N GONORRHOEA DNA SPEC QL NAA+PROBE: NEGATIVE

## 2024-12-30 RX ORDER — ESTRADIOL 10 UG/1
10 INSERT VAGINAL
Qty: 24 TABLET | Refills: 1 | OUTPATIENT
Start: 2024-12-30

## 2024-12-30 RX ORDER — ESTRADIOL 10 UG/1
INSERT VAGINAL
Qty: 24 TABLET | Refills: 0 | OUTPATIENT
Start: 2024-12-30

## 2024-12-30 NOTE — TELEPHONE ENCOUNTER
Pt would like a refill on the medication Estradiol 10 MCG Vaginal Tab. Pt states Dr. Do was the original provider to order this medication and refill it. But pt states that Dr. Do told her to start receiving the order and refills from pts' gynecologist which is Dr. Chew. Please advise.

## 2025-01-02 NOTE — TELEPHONE ENCOUNTER
Patient notified by detailed message that we can check with Dr. Chew to see if a telehealth appointment is an option.  Advised patient to contact her PCP to request refill at this time since she will need to complete an appointment in our office before a prescription could be sent.

## 2025-01-02 NOTE — TELEPHONE ENCOUNTER
Pt returned call and asked if it would be possible to do a telehealth to get the refill. Pt is out of HRT medication.

## 2025-01-03 ENCOUNTER — TELEMEDICINE (OUTPATIENT)
Facility: CLINIC | Age: 60
End: 2025-01-03
Payer: MEDICAID

## 2025-01-03 ENCOUNTER — TELEPHONE (OUTPATIENT)
Dept: OBGYN CLINIC | Facility: CLINIC | Age: 60
End: 2025-01-03

## 2025-01-03 DIAGNOSIS — E03.9 HYPOTHYROIDISM, UNSPECIFIED TYPE: ICD-10-CM

## 2025-01-03 DIAGNOSIS — M85.859 OSTEOPENIA OF NECK OF FEMUR, UNSPECIFIED LATERALITY: Primary | ICD-10-CM

## 2025-01-03 NOTE — TELEPHONE ENCOUNTER
You may offer her a telehealth visit per the appropriate first appointment availability in my schedule.  Thank you.

## 2025-01-03 NOTE — TELEPHONE ENCOUNTER
Patient notified by detailed message left on voicemail that appointment will need to be completed PRIOR to refill being sent since HRT was not discussed at her last office visit.  Patient was advised to contact her PCP to request refill at this time.

## 2025-01-03 NOTE — PROGRESS NOTES
ENDOCRINOLOGY, DIABETES & METABOLISM CONSULT NOTE   Date: 7/2/24  Name: Julieta Goff   Referring Physician: No ref. provider found    Patient verbally consents to a Video/Telephone service for this visit.  Patient understands and accepts financial responsibility for any deductible, co-insurance and/or co-pays associated with this service.  Subjective:    HISTORY OF PRESENT ILLNESS:   Julieta Goff is a 59 year old female seen in consultation for her osteopenia    Initial HPI 5/2024  Patient presents to the clinic for an initial bone health evaluation due to a history of DEXA confirmed osteopenia on 4/2024 DXA.    OSTEOPOROSIS RISK FACTORS ASSESSMENT  Postmenopausal DORA around 7-8 years ago (early 50's) on HRT for 1 year    Maternal hx of osteoporosis or hx of parental hip fx:  No   Recent Fracture:   Fell around 1/2024 and diagnosed with right rib fractures that are not healing    Previous fracture after the age of 50:  No   Hx of kidney stones No   Frequent falls No   Poor vision Nohx of cataract surgery; wearing glasses    Decrease in height No   New or Worsening Back Pain No   History of Vit D insufficiency:   Current Vitamin D supplementation: No  D3 2000 international unit     Poor intake of calcium  Daily calcium intake: No  Calcium 1200 mg 2 tabs daily    Caffeine intake Yes, 3-4 glasses of tea + 1 can of coke daily   Smoking No   Alcohol intake >3/d:  Yes, on the weekend will ocassionally have 3 or more glasses of hard liquor   Hx of thyroid disease Yes, hypothyroidism diagnosed 20+ years ago and on LT4 75 mcg daily    Hx of calcium problems or hyperparathyroidism No   Previous treatment for osteoporosis No   Malabsorption, chronic diarrhea, celiac sprue   No   History of RA  No   History of skeletal radiation No   History of eating disorder No     Intake of medications that cause bone loss:    Long term steroid use: Yes, intermittently on steroid taper for asthma, last was 3 years ago  Aromatase  inhibitor: No  Seizure medications: No  GnRH agonist: No  PPI: Yes, pantoprazole for 6 months   Lithium: No  SSRI: Yes, many years ago for 1 year  Actos/Avandia: No    Of note, lost around 70 pounds over a small amount of time   Treatment Contraindications:  Dysphagia: denies  Pain on swallowing: denies  Plans for dental procedures: around 4 months ago for cleaning     7/2024  Follows with nephrology  Following with orthopedic regarding rib fracture which is healing   Notes intermittent rib pain   Continued on calcium and vitamin D supplement     Interval Hx 01/03/25  Labs: 12/24/2024 bone specific alk phos 11.1, , TSH 1.381, free T41.3, total calcium 9.5, creatinine 0.94, EGFR 69, albumin 4.0, Phos 3.5, vitamin D 62.8  Imaging: Last bone density April 2024 with lowest T-score of -1.7 in femoral neck   Continues on LT4 75 mcg daily  No falls or fractures since LOV   Denies kidney stones  Continues on calcium 1200 mg and vitamin D 2000 supplements   Has adequate dairy intake        Allergies, PMH, SocHx and FHx reviewed and updated as appropriate in Epic on   No outpatient medications have been marked as taking for the 1/3/25 encounter (Appointment) with Beverley Munoz DO.     Allergies   Allergen Reactions    Epinephrine OTHER (SEE COMMENTS)    Cats Claw, Uncaria Tomentosa RASH    Dander RASH     Dogs      Dust Mites RASH    Grass RASH    Mold RASH     Current Outpatient Medications   Medication Sig Dispense Refill    levothyroxine 75 MCG Oral Tab TAKE 1 TABLET(75 MCG) BY MOUTH BEFORE BREAKFAST 90 tablet 0    montelukast 10 MG Oral Tab Take 1 tablet (10 mg total) by mouth nightly. 90 tablet 0    ALPRAZolam 0.5 MG Oral Tab Take 1 tablet (0.5 mg total) by mouth daily as needed. 30 tablet 2    traZODone 150 MG Oral Tab Take 1 tablet (150 mg total) by mouth nightly as needed for Sleep. 90 tablet 1    IBUPROFEN 600 MG Oral Tab TAKE 1 TABLET(600 MG) BY MOUTH EVERY 6 HOURS AS NEEDED FOR PAIN 40 tablet 0     LOSARTAN 50 MG Oral Tab TAKE 1 TABLET(50 MG) BY MOUTH TWICE DAILY 180 tablet 0    Estradiol 10 MCG Vaginal Tab Place 10 mcg vaginally twice a week. 24 tablet 0    pantoprazole 40 MG Oral Tab EC Take 1 tablet (40 mg total) by mouth before breakfast. 90 tablet 1    Cyanocobalamin (B-12 OR) Take by mouth.      buPROPion  MG Oral Tablet 24 Hr TAKE 1 TABLET(300 MG) BY MOUTH DAILY 90 tablet 3    valACYclovir 1 G Oral Tab Take 1 tablet (1,000 mg total) by mouth as needed. 2x 12 21 tablet 1    fluconazole (DIFLUCAN) 150 MG Oral Tab Take 1 tablet (150 mg total) by mouth every third day. 2 tablet 0    Cranberry 475 MG Oral Cap Take by mouth.      cetirizine 10 MG Oral Tab Take 1 tablet (10 mg total) by mouth daily.      cholecalciferol 25 MCG (1000 UT) Oral Tab Take 1 tablet (1,000 Units total) by mouth daily.      ketorolac 0.5 % Ophthalmic Solution       SYMBICORT 80-4.5 MCG/ACT Inhalation Aerosol Inhale 2 puffs into the lungs 2 (two) times daily.      fluticasone propionate 50 MCG/ACT Nasal Suspension 1 spray in each nostril Nasally Once a day as needed      CRANBERRY OR Take 15 mg by mouth daily.      loratadine 10 MG Oral Tab Take 1 tablet (10 mg total) by mouth daily as needed for Allergies.      Lifitegrast (XIIDRA OP) Apply to eye 2 (two) times a day.      albuterol 108 (90 Base) MCG/ACT Inhalation Aero Soln Inhale 2 puffs into the lungs every 4 (four) hours as needed.      Ascorbic Acid (VITAMIN C) 100 MG Oral Tab Take 1 tablet (100 mg total) by mouth in the morning and 1 tablet (100 mg total) before bedtime.      zinc sulfate 220 (50 Zn) MG Oral Cap Take 25 mg by mouth daily.      Multiple Vitamins-Minerals (MULTI-VITAMIN/MINERALS) Oral Tab Take 1 tablet by mouth daily.       Past Medical History:    Allergic rhinitis    Anxiety    Arthritis    Asthma (HCC)    Depression    Esophageal reflux    Fatty liver    7/27/2023 ultrasound of abdomen    Hyperlipidemia    Hyperthyroidism    Renal insufficiency     Past  Surgical History:   Procedure Laterality Date    Colonoscopy      Hysterectomy                Other surgical history      Eye surgery    Tubal ligation       Social History     Socioeconomic History    Marital status:    Tobacco Use    Smoking status: Never     Passive exposure: Never    Smokeless tobacco: Never   Vaping Use    Vaping status: Never Used   Substance and Sexual Activity    Alcohol use: Yes     Alcohol/week: 3.0 - 4.0 standard drinks of alcohol     Types: 3 - 4 Standard drinks or equivalent per week     Comment: occ    Drug use: Never    Sexual activity: Yes     Birth control/protection: Hysterectomy   Other Topics Concern    Caffeine Concern No    Exercise No    Seat Belt Yes    Special Diet No    Stress Concern Yes    Weight Concern No     Service No    Blood Transfusions No    Occupational Exposure No    Hobby Hazards No    Sleep Concern No    Back Care No    Bike Helmet No    Self-Exams No     Family History   Problem Relation Age of Onset    Diabetes Father     Stroke Father     Depression Father     Obesity Sister        REVIEW OF SYSTEMS: 10 point ROS completed, refer to HPI for pertinent positives    Objective:   PHYSICAL EXAMINATION:  Vital Signs:   There were no vitals filed for this visit.    PHYSICAL EXAM- limited due to telemedicine encounter    Constitutional Not in acute distress  Pulmonary: no wheezing heard, no coughing on the phone. Speaking in full sentences.  Neurological:  Alert and oriented to person, place and time.   Psychiatric: Normal affect, mood and behavior appropriate      Recent Labs: Personally reviewed in [employer] under lab tab.  Interpretation: Negative SPEP, thyroid within range upper limit bone turnover markers  2024 bone specific alk phos 11.1, , TSH 1.381, free T41.3, total calcium 9.5, creatinine 0.94, EGFR 69, albumin 4.0, Phos 3.5, vitamin D 62.8  2024 creatinine 1.06, EGFR 60, calcium 9.3, albumin 3.6, SPEP  without monoclonal protein, TSH 0.9 with free T4 1.1, , bone specific alkaline phosphatase 11.3   3/2024 vit d 65, ionized calcium 4.9, pth 60  2/2024 TSH 0.566, , ca9.2, egfr 68 with cr 0.96    Radiology:  Independently visualized pertinent imaging  I reviewed the patient's records from outside facility which revealed: osteopenia    DXA Scans:  Date L2-L4 BMD T-score % change Mean Femoral Neck BMD T-score % change   4/24/2024 pf 1.075 0.3 0.664 -1.7   2/17/2023 pf 1.096 0.4 0.752 -0.9           ASSESSMENT/PLAN:  Julieta Goff is a 59 year old female seen in consultation for:    #Osteopenia of neck of femur, unspecified laterality   Discussed pathophysiology of bone loss and clinical significance of DEXA scans with the patient.  The patient has confirmed osteopenia with low T-scores in the mean femoral neck. Diagnosed with right rib fractures 1/2024.  Explained the patient's risk factors for osteopenia include menopause at age 48, age, and SSRI hx. The patient is at high risk for future osteoporotic fractures if her osteoporosis remains untreated.  6/2024 secondary causes of osteoporosis, including SPEP, PTH, celiac screen, Alk Phos levels, and vitamin D levels WNL Will discuss next steps once labs result   6/2024 Cr 1.06; followed with nephrology and switched from OTC heartburn medication to PPI  Plan:  Discussed the importance of adopting lifestyle measures, such as adequate calcium and vitamin D intake, exercise, smoking cessation, counseling on fall prevention, and avoidance of heavy alcohol use, to reduce bone loss in postmenopausal women.  Suggested 1200 mg of elemental calcium daily (total diet plus supplement) and 2000 IU of vitamin D daily   Recommended pharmacologic therapy for postmenopausal women with established osteoporosis or fragility fracture.  Discussed available treatment options for osteoporosis, including bisphosphonates (oral vs. IV), anabolics, Evenity, and Prolia injections,  as well as their indications, risks, and benefits, including black box warnings.  Discussed concerns about an increased risk of vertebral fracture after discontinuation of denosumab, the need for indefinite administration of denosumab   We had an extensive discussion at [facility] regarding the risks and benefits of therapy and the risks of no therapy.  We reviewed patient's FRAX score.  At this time patient would not like to start any pharmacotherapy.  We had discussed repeating labs prior to our follow-up visit to discuss if patient would like to start any pharmacologic therapy at that time.  12/2024 with stable labs  Recommended DXA every two years for patients starting on therapy, with additional evaluation for contributing factors if a clinically significant BMD decrease or new fracture occurs. Due 4/2026  Repeat labs 6/2025 with follow up thereafter      #Hypothyroidism, unspecified type    Lab Results   Component Value Date    TSH 1.381 12/19/2024    T4F 1.3 12/19/2024   Currently on LT4 75 mcg daily with stable labs  Patient is compliant with current LT4 administration and is taking it appropriately   Discussed with pt the proper administration of LT4: ideally first thing in the morning on an empty stomach and taken only with water, separate from all other foods/beverages/medications by 30-60 minutes.    Repeat levels prior to follow up visit     No follow-ups on file.    The above plan was discussed in detail with the patient who verbalized understanding and agreement.      Beverley Munoz DO  [facility]  1/3/2025     In reviewing this note, please be advised that Dragon Voice Recognition software used to dictate the note may have made errors in recognizing some of the words or phrases.     Note to patient: The 21 Century Cures Act makes medical notes like these available to patients in the interest of transparency. However, be advised this is a medical document. It is intended as peer to peer  communication. It is written in medical language and may contain abbreviations or verbiage that are unfamiliar. It may appear blunt or direct. Medical documents are intended to carry relevant information, facts as evident, and the clinical opinion of the practitioner.

## 2025-01-03 NOTE — TELEPHONE ENCOUNTER
Patient needs refill on HRT medication and made appt with Dr. Chew for phone visit. Would like meds refilled via pharmacy

## 2025-01-07 ENCOUNTER — TELEMEDICINE (OUTPATIENT)
Dept: FAMILY MEDICINE CLINIC | Facility: CLINIC | Age: 60
End: 2025-01-07
Payer: MEDICAID

## 2025-01-07 DIAGNOSIS — M54.6 CHRONIC RIGHT-SIDED THORACIC BACK PAIN: Primary | ICD-10-CM

## 2025-01-07 DIAGNOSIS — G89.29 CHRONIC RIGHT-SIDED THORACIC BACK PAIN: Primary | ICD-10-CM

## 2025-01-07 PROCEDURE — 98006 SYNCH AUDIO-VIDEO EST MOD 30: CPT | Performed by: FAMILY MEDICINE

## 2025-01-07 RX ORDER — MELOXICAM 15 MG/1
15 TABLET ORAL DAILY
Qty: 30 TABLET | Refills: 0 | Status: SHIPPED | OUTPATIENT
Start: 2025-01-07 | End: 2025-02-06

## 2025-01-07 NOTE — PROGRESS NOTES
This visit is conducted using Telemedicine with live, interactive video and audio.    Patient has been referred to the [facility] website at [email or web address] to review the yearly Consent to Treat document.    Patient understands and accepts financial responsibility for any deductible, co-insurance and/or co-pays associated with this service.     CHIEF COMPLAINT:   Back pain x 1 year    HPI:   Julieta Goff is a 59 year old female who presents for a video visit.  Patient reports back pain and rib pain x1 year. Pain started after she fell and broke her ribs. Ribs have healed and the rib pain has gone away however the back pain has been ongoing. Patient states that she has been to the chiropractor and they suggest evaluation for steroid injections.  Patient denies fever chills nausea or vomiting.  Patient has tried motrin 600 mg daily  for symptoms, which has slightly seemed to help.     Current Outpatient Medications   Medication Sig Dispense Refill    Meloxicam 15 MG Oral Tab Take 1 tablet (15 mg total) by mouth daily. 30 tablet 0    levothyroxine 75 MCG Oral Tab TAKE 1 TABLET(75 MCG) BY MOUTH BEFORE BREAKFAST 90 tablet 0    montelukast 10 MG Oral Tab Take 1 tablet (10 mg total) by mouth nightly. 90 tablet 0    ALPRAZolam 0.5 MG Oral Tab Take 1 tablet (0.5 mg total) by mouth daily as needed. 30 tablet 2    traZODone 150 MG Oral Tab Take 1 tablet (150 mg total) by mouth nightly as needed for Sleep. 90 tablet 1    IBUPROFEN 600 MG Oral Tab TAKE 1 TABLET(600 MG) BY MOUTH EVERY 6 HOURS AS NEEDED FOR PAIN 40 tablet 0    LOSARTAN 50 MG Oral Tab TAKE 1 TABLET(50 MG) BY MOUTH TWICE DAILY 180 tablet 0    Estradiol 10 MCG Vaginal Tab Place 10 mcg vaginally twice a week. 24 tablet 0    pantoprazole 40 MG Oral Tab EC Take 1 tablet (40 mg total) by mouth before breakfast. 90 tablet 1    Cyanocobalamin (B-12 OR) Take by mouth.      buPROPion  MG Oral Tablet 24 Hr TAKE 1 TABLET(300 MG) BY MOUTH DAILY 90 tablet 3     valACYclovir 1 G Oral Tab Take 1 tablet (1,000 mg total) by mouth as needed. 2x 12 21 tablet 1    fluconazole (DIFLUCAN) 150 MG Oral Tab Take 1 tablet (150 mg total) by mouth every third day. 2 tablet 0    Cranberry 475 MG Oral Cap Take by mouth.      cetirizine 10 MG Oral Tab Take 1 tablet (10 mg total) by mouth daily.      cholecalciferol 25 MCG (1000 UT) Oral Tab Take 1 tablet (1,000 Units total) by mouth daily.      ketorolac 0.5 % Ophthalmic Solution       SYMBICORT 80-4.5 MCG/ACT Inhalation Aerosol Inhale 2 puffs into the lungs 2 (two) times daily.      fluticasone propionate 50 MCG/ACT Nasal Suspension 1 spray in each nostril Nasally Once a day as needed      CRANBERRY OR Take 15 mg by mouth daily.      loratadine 10 MG Oral Tab Take 1 tablet (10 mg total) by mouth daily as needed for Allergies.      Lifitegrast (XIIDRA OP) Apply to eye 2 (two) times a day.      albuterol 108 (90 Base) MCG/ACT Inhalation Aero Soln Inhale 2 puffs into the lungs every 4 (four) hours as needed.      Ascorbic Acid (VITAMIN C) 100 MG Oral Tab Take 1 tablet (100 mg total) by mouth in the morning and 1 tablet (100 mg total) before bedtime.      zinc sulfate 220 (50 Zn) MG Oral Cap Take 25 mg by mouth daily.      Multiple Vitamins-Minerals (MULTI-VITAMIN/MINERALS) Oral Tab Take 1 tablet by mouth daily.        Past Medical History:    Allergic rhinitis    Anxiety    Arthritis    Asthma (HCC)    Depression    Esophageal reflux    Fatty liver    2023 ultrasound of abdomen    Hyperlipidemia    Hyperthyroidism    Renal insufficiency      Past Surgical History:   Procedure Laterality Date    Colonoscopy      Hysterectomy      -          Other surgical history      Eye surgery    Tubal ligation           Social History     Socioeconomic History    Marital status:    Tobacco Use    Smoking status: Never     Passive exposure: Never    Smokeless tobacco: Never   Vaping Use    Vaping status: Never Used    Substance and Sexual Activity    Alcohol use: Yes     Alcohol/week: 3.0 - 4.0 standard drinks of alcohol     Types: 3 - 4 Standard drinks or equivalent per week     Comment: occ    Drug use: Never    Sexual activity: Yes     Birth control/protection: Hysterectomy   Other Topics Concern    Caffeine Concern No    Exercise No    Seat Belt Yes    Special Diet No    Stress Concern Yes    Weight Concern No     Service No    Blood Transfusions No    Occupational Exposure No    Hobby Hazards No    Sleep Concern No    Back Care No    Bike Helmet No    Self-Exams No         REVIEW OF SYSTEMS:   GENERAL: Normal appetite  SKIN: no rashes or abnormal skin lesions  HEENT: Normal  LUNGS: denies shortness of breath or wheezing,   CARDIOVASCULAR: denies chest pain or palpitations   GI: denies N/V/C or abdominal pain  NEURO: Denies headaches    EXAM:   General: Alert  Respiratory:   Speaking in full sentences comfortably  Normal work of breathing  No cough during visit  Head: Normocephalic  Nose: No obvious nasal discharge.  Skin: No obvious rashes or lesions from what observed.     No results found for this or any previous visit (from the past 24 hours).    ASSESSMENT AND PLAN:   Julieta Goff is a 59 year old female who presents with symptoms that are consistent with    ASSESSMENT:   Encounter Diagnosis   Name Primary?    Chronic right-sided thoracic back pain Yes       PLAN: Meds as below.  See patient Instructions          Procedures    Pain Management Referral - In Network        Meds & Refills for this Visit:  Requested Prescriptions     Signed Prescriptions Disp Refills    Meloxicam 15 MG Oral Tab 30 tablet 0     Sig: Take 1 tablet (15 mg total) by mouth daily.       Risks, benefits, and side effects of medication explained and discussed.    The patient indicates understanding of these issues and agrees to the plan.  The patient is asked to return if sx's persist or worsen.    Face to face time spent on Video  Visit: 10  Total Time spent on visit including reviewing history, ordering labs/medication, patient examination and education: 10    Julieta Goff understands video visit evaluation is not a substitute for face-to-face examination or emergency care. Patient advised to go to ER or call 911 for worsening symptoms or acute distress.

## 2025-01-18 ENCOUNTER — HOSPITAL ENCOUNTER (OUTPATIENT)
Dept: MAMMOGRAPHY | Age: 60
Discharge: HOME | End: 2025-01-18
Attending: FAMILY MEDICINE
Payer: MEDICAID

## 2025-01-18 DIAGNOSIS — Z12.31 ENCOUNTER FOR SCREENING MAMMOGRAM FOR BREAST CANCER: ICD-10-CM

## 2025-01-18 PROCEDURE — 77067 SCR MAMMO BI INCL CAD: CPT | Performed by: FAMILY MEDICINE

## 2025-01-18 PROCEDURE — 77063 BREAST TOMOSYNTHESIS BI: CPT | Performed by: FAMILY MEDICINE

## 2025-01-22 DIAGNOSIS — I10 ESSENTIAL HYPERTENSION: ICD-10-CM

## 2025-01-22 RX ORDER — LOSARTAN POTASSIUM 50 MG/1
50 TABLET ORAL 2 TIMES DAILY
Qty: 180 TABLET | Refills: 0 | Status: SHIPPED | OUTPATIENT
Start: 2025-01-22

## 2025-01-22 NOTE — TELEPHONE ENCOUNTER
Name from pharmacy: LOSARTAN 50MG TABLETS         Will file in chart as: LOSARTAN 50 MG Oral Tab    Sig: TAKE 1 TABLET(50 MG) BY MOUTH TWICE DAILY    Disp: 180 tablet    Refills: 0 (Pharmacy requested: Not specified)    Start: 1/22/2025    Class: Normal    Non-formulary For: Essential hypertension    Last ordered: 2 months ago (10/28/2024) by Tung Do DO    Last refill: 10/28/2024    Rx #: [id number]    Hypertension Medications Protocol [id number]/22/2025 11:56 AM   Protocol Details CMP or BMP in past 12 months    Last BP reading less than 140/90    In person appointment or virtual visit in the past 12 mos or appointment in next 3 mos    EGFRCR or GFRNAA > 50    Medication is active on med list      To be filled at: [id number] - [place], [place] - [address], [phone], [phone]

## 2025-01-24 ENCOUNTER — TELEPHONE (OUTPATIENT)
Facility: CLINIC | Age: 60
End: 2025-01-24

## 2025-01-24 DIAGNOSIS — R12 HEARTBURN: Primary | ICD-10-CM

## 2025-01-24 RX ORDER — PANTOPRAZOLE SODIUM 40 MG/1
40 TABLET, DELAYED RELEASE ORAL
Qty: 90 TABLET | Refills: 3 | Status: SHIPPED | OUTPATIENT
Start: 2025-01-24 | End: 2026-01-19

## 2025-01-24 NOTE — TELEPHONE ENCOUNTER
Med refilled to pharmacy on file which is is [place], [place]. I left voicemail for patient telling her to call back if this is the wrong pharmacy.     Charmaine Gorman, APRN

## 2025-01-24 NOTE — TELEPHONE ENCOUNTER
Jamie Montano    The patient is requesting refill for pantoprazole.  I am unable to refill per protocol.    Last office visit 12/29/2023 (virtual)    Last refill: 9/10/2024        D: 90           R:1     I will ask the patient to schedule a follow up.    Thank you

## 2025-01-24 NOTE — TELEPHONE ENCOUNTER
Patient called for refill/90 day supply to [facility]    Please call.      Current Outpatient Medications:       pantoprazole 40 MG Oral Tab EC, Take 1 tablet (40 mg total) by mouth before breakfast., Disp: 90 tablet, Rfl: 1

## 2025-01-30 DIAGNOSIS — I10 ESSENTIAL HYPERTENSION: ICD-10-CM

## 2025-01-31 ENCOUNTER — TELEPHONE (OUTPATIENT)
Age: 60
End: 2025-01-31

## 2025-01-31 ENCOUNTER — TELEPHONE (OUTPATIENT)
Dept: PAIN CLINIC | Facility: CLINIC | Age: 60
End: 2025-01-31

## 2025-01-31 RX ORDER — LOSARTAN POTASSIUM 50 MG/1
50 TABLET ORAL 2 TIMES DAILY
Qty: 180 TABLET | Refills: 0 | Status: SHIPPED | OUTPATIENT
Start: 2025-01-31

## 2025-01-31 NOTE — TELEPHONE ENCOUNTER
Prior authorization request completed for: right side intercostal nerve block w/ultrasound   Authorization # no auth needed   Pre-D: no  Exclusions/Restrictions: no  Covered Benefit: yes   Authorization dates: n/a  CPT codes approved: 64420x3,29763  Number of visits/dates of service approved: 1  Physician: brendan  Location: office  Call Ref#: [id number]  Representative Name: channel   Insurance Carrier: [phone]    Patient can be scheduled. Routed to Navigator.

## 2025-01-31 NOTE — TELEPHONE ENCOUNTER
Hello - I am reaching out from the [facility] Navigation department, following up on an order from your provider's office to assist in connecting you with resources for care. If you would like to discuss this further, please give us a call at [phone], or for more immediate assistance you can contact our 24-hour help line at [phone]. We look forward to hearing from you soon.

## 2025-02-01 DIAGNOSIS — F33.1 MAJOR DEPRESSIVE DISORDER, RECURRENT EPISODE, MODERATE WITH ANXIOUS DISTRESS (HCC): ICD-10-CM

## 2025-02-01 DIAGNOSIS — Z51.81 THERAPEUTIC DRUG MONITORING: ICD-10-CM

## 2025-02-01 DIAGNOSIS — F43.9 STRESS AT HOME: ICD-10-CM

## 2025-02-01 RX ORDER — TRAZODONE HYDROCHLORIDE 150 MG/1
150 TABLET ORAL NIGHTLY PRN
Qty: 90 TABLET | Refills: 1 | Status: SHIPPED | OUTPATIENT
Start: 2025-02-01

## 2025-02-02 DIAGNOSIS — M54.6 CHRONIC RIGHT-SIDED THORACIC BACK PAIN: ICD-10-CM

## 2025-02-02 DIAGNOSIS — G89.29 CHRONIC RIGHT-SIDED THORACIC BACK PAIN: ICD-10-CM

## 2025-02-02 RX ORDER — MELOXICAM 15 MG/1
15 TABLET ORAL DAILY
Qty: 30 TABLET | Refills: 0 | Status: SHIPPED | OUTPATIENT
Start: 2025-02-02

## 2025-02-03 NOTE — TELEPHONE ENCOUNTER
Call transferred from PSR - patient returning call to schedule injection.     Patient asked if injection is painful? -- informed patient that every patient's pain tolerance is different, some patient's experience pressure or pinch. steroid & lidocaine (numbing agent) are mixed so area will be numbed as medication is being injected. Mentioned to patient that freeze spray is also an option that RN can spray to provide the area to feel more numbed. Patient VU.     Scheduled patient for In Office -right intercostal NB w/ultrasound on 02/05/25 @ 04:00 PM.

## 2025-02-05 ENCOUNTER — NURSE ONLY (OUTPATIENT)
Dept: PAIN CLINIC | Facility: CLINIC | Age: 60
End: 2025-02-05
Payer: MEDICAID

## 2025-02-05 ENCOUNTER — OFFICE VISIT (OUTPATIENT)
Dept: PAIN CLINIC | Facility: CLINIC | Age: 60
End: 2025-02-05
Payer: MEDICAID

## 2025-02-05 VITALS
SYSTOLIC BLOOD PRESSURE: 124 MMHG | OXYGEN SATURATION: 98 % | WEIGHT: 114 LBS | DIASTOLIC BLOOD PRESSURE: 62 MMHG | HEART RATE: 75 BPM | BODY MASS INDEX: 22 KG/M2

## 2025-02-05 DIAGNOSIS — M79.18 MYOFASCIAL PAIN: Primary | ICD-10-CM

## 2025-02-05 DIAGNOSIS — G58.8 INTERCOSTAL NEURALGIA: ICD-10-CM

## 2025-02-05 DIAGNOSIS — G58.8 INTERCOSTAL NEURALGIA: Primary | ICD-10-CM

## 2025-02-05 PROCEDURE — 76942 ECHO GUIDE FOR BIOPSY: CPT | Performed by: ANESTHESIOLOGY

## 2025-02-05 PROCEDURE — 64420 NJX AA&/STRD NTRCOST NRV 1: CPT | Performed by: ANESTHESIOLOGY

## 2025-02-05 RX ORDER — BUPIVACAINE HYDROCHLORIDE 5 MG/ML
9 INJECTION, SOLUTION EPIDURAL; INTRACAUDAL ONCE
Status: COMPLETED | OUTPATIENT
Start: 2025-02-05 | End: 2025-02-05

## 2025-02-05 RX ORDER — TRIAMCINOLONE ACETONIDE 40 MG/ML
40 INJECTION, SUSPENSION INTRA-ARTICULAR; INTRAMUSCULAR ONCE
Status: COMPLETED | OUTPATIENT
Start: 2025-02-05 | End: 2025-02-05

## 2025-02-05 RX ORDER — LIDOCAINE HYDROCHLORIDE 10 MG/ML
10 INJECTION, SOLUTION INFILTRATION; PERINEURAL ONCE
Status: COMPLETED | OUTPATIENT
Start: 2025-02-05 | End: 2025-02-05

## 2025-02-05 NOTE — PROGRESS NOTES
Timeout completed prior to procedure @ 6691.  Participants present for timeout:  Dr. Powers, RN Sonja , and patient.

## 2025-02-05 NOTE — PROCEDURES
Date of procedure: February 5, 2025    Pre-op diagnosis: Rib pain    Postop diagnosis: Same x-ray     Procedure: Ultrasound-guided paravertebral nerve block    Surgeon: Shiv Mcfadden    Anesthesia: Lidocaine    EBL: 0    Indication: Patient is a 59-year-old female with history of broken ribs and persistent thoracic pain    Procedure detail: Informed consent obtained.  Timeout done.  Skin overlying the thoracic spine was prepped in the usual sterile fashion.  Subsequently, sterile ultrasound imaging was used to identify the paravertebral space at the T8-9 level.  The overlying soft tissue was then anesthetized with 10 cc 1% lidocaine.  A 21-gauge Stimuplex needle was advanced with live ultrasound imaging towards the paravertebral space.  After negative aspiration for heme, total nature of 40 mg of Depo-Medrol and 9 cc of 0.5% bupivacaine was then injected after repeat negative aspiration.  There was depression of the pleura.  Attention was paid that the pleura was not violated at any point.  The patient tolerated procedure well.  Needle withdrawn tip intact.    Shiv Powers MD

## 2025-02-05 NOTE — PROGRESS NOTES
Patient presents in office today with reported pain in RIGHT SIDE INTERCOSTAL NERVE     Current pain level reported = 3/10    Last reported dose of meloxicam this morning      Narcotic Contract renewal NA    Urine Drug screen NA

## 2025-02-05 NOTE — PATIENT INSTRUCTIONS
Refill policies:    Allow 2-3 business days for refills; controlled substances may take longer.  Contact your pharmacy at least 5 days prior to running out of medication and have them send an electronic request or submit request through the “request refill” option in your [employer] account.  Refills are not addressed on weekends; covering physicians do not authorize routine medications on weekends.  No narcotics or controlled substances are refilled after noon on Fridays or by on call physicians.  By law, narcotics must be electronically prescribed.  A 30 day supply with no refills is the maximum allowed.  If your prescription is due for a refill, you may be due for a follow up appointment.  To best provide you care, patients receiving routine medications need to be seen at least once a year.  Patients receiving narcotic/controlled substance medications need to be seen at least once every 3 months.  In the event that your preferred pharmacy does not have the requested medication in stock (e.g. Backordered), it is your responsibility to find another pharmacy that has the requested medication available.  We will gladly send a new prescription to that pharmacy at your request.    Scheduling Tests:    If your physician has ordered radiology tests such as MRI or CT scans, please contact Central Scheduling at [phone] right away to schedule the test.  Once scheduled, the [facility] Centralized Referral Team will work with your insurance carrier to obtain pre-certification or prior authorization.  Depending on your insurance carrier, approval may take 3-10 days.  It is highly recommended patients assure they have received an authorization before having a test performed.  If test is done without insurance authorization, patient may be responsible for the entire amount billed.      Precertification and Prior Authorizations:  If your physician has recommended that you have a procedure or additional testing performed the [facility]  Centralized Referral Team will contact your insurance carrier to obtain pre-certification or prior authorization.    You are strongly encouraged to contact your insurance carrier to verify that your procedure/test has been approved and is a COVERED benefit.  Although the [facility] Centralized Referral Team does its due diligence, the insurance carrier gives the disclaimer that \"Although the procedure is authorized, this does not guarantee payment.\"    Ultimately the patient is responsible for payment.   Thank you for your understanding in this matter.  Paperwork Completion:  If you require FMLA or disability paperwork for your recovery, please make sure to either drop it off or have it faxed to our office at [phone]. Be sure the form has your name and date of birth on it.  The form will be faxed to our Forms Department and they will complete it for you.  There is a 25$ fee for all forms that need to be filled out.  Please be aware there is a 10-14 day turnaround time.  You will need to sign a release of information (STAR) form if your paperwork does not come with one.  You may call the Forms Department with any questions at [phone].  Their fax number is [phone].

## 2025-02-06 ENCOUNTER — VIRTUAL PHONE E/M (OUTPATIENT)
Facility: CLINIC | Age: 60
End: 2025-02-06
Payer: MEDICAID

## 2025-02-06 DIAGNOSIS — K21.9 GASTROESOPHAGEAL REFLUX DISEASE, UNSPECIFIED WHETHER ESOPHAGITIS PRESENT: ICD-10-CM

## 2025-02-06 DIAGNOSIS — R93.5 ABNORMAL CT OF THE ABDOMEN: ICD-10-CM

## 2025-02-06 DIAGNOSIS — Z12.11 COLON CANCER SCREENING: Primary | ICD-10-CM

## 2025-02-06 DIAGNOSIS — D64.9 NORMOCYTIC ANEMIA: ICD-10-CM

## 2025-02-06 DIAGNOSIS — R19.8 IRREGULAR BOWEL HABITS: ICD-10-CM

## 2025-02-06 PROCEDURE — 99214 OFFICE O/P EST MOD 30 MIN: CPT

## 2025-02-06 NOTE — PATIENT INSTRUCTIONS
-can continue miralax 2-3 days per week     -I recommend adding psyllium husk fiber such as metamucil. You can try taking a few days a week but increase to daily use if still having irregular or incomplete bowel movements    -cologuard test will be mailed for you     -can purchase famotidine 20mg over the counter and continue this daily     -go to lab for repeat CBC    -can call to schedule CT abdomen/pelvis #[phone]    -------------------------------------------------------------------------------------------------

## 2025-02-11 ENCOUNTER — TELEPHONE (OUTPATIENT)
Age: 60
End: 2025-02-11

## 2025-02-11 ENCOUNTER — TELEPHONE (OUTPATIENT)
Facility: CLINIC | Age: 60
End: 2025-02-11

## 2025-02-11 NOTE — TELEPHONE ENCOUNTER
The [facility] Navigation team has attempted to reach you in order to follow up on an order that was placed by Soraya Rey' office. Please give us a call back at [phone] to discuss care coordination and resources.

## 2025-02-25 ENCOUNTER — LAB ENCOUNTER (OUTPATIENT)
Dept: LAB | Age: 60
End: 2025-02-25
Attending: NURSE PRACTITIONER
Payer: MEDICAID

## 2025-02-25 DIAGNOSIS — D64.9 NORMOCYTIC ANEMIA: ICD-10-CM

## 2025-02-25 LAB
BASOPHILS # BLD AUTO: 0.04 X10(3) UL (ref 0–0.2)
BASOPHILS NFR BLD AUTO: 0.5 %
EOSINOPHIL # BLD AUTO: 0.13 X10(3) UL (ref 0–0.7)
EOSINOPHIL NFR BLD AUTO: 1.7 %
ERYTHROCYTE [DISTWIDTH] IN BLOOD BY AUTOMATED COUNT: 12.9 %
HCT VFR BLD AUTO: 35.9 %
HGB BLD-MCNC: 11.7 G/DL
IMM GRANULOCYTES # BLD AUTO: 0.02 X10(3) UL (ref 0–1)
IMM GRANULOCYTES NFR BLD: 0.3 %
LYMPHOCYTES # BLD AUTO: 1.78 X10(3) UL (ref 1–4)
LYMPHOCYTES NFR BLD AUTO: 23.1 %
MCH RBC QN AUTO: 30 PG (ref 26–34)
MCHC RBC AUTO-ENTMCNC: 32.6 G/DL (ref 31–37)
MCV RBC AUTO: 92.1 FL
MONOCYTES # BLD AUTO: 0.41 X10(3) UL (ref 0.1–1)
MONOCYTES NFR BLD AUTO: 5.3 %
NEUTROPHILS # BLD AUTO: 5.33 X10 (3) UL (ref 1.5–7.7)
NEUTROPHILS # BLD AUTO: 5.33 X10(3) UL (ref 1.5–7.7)
NEUTROPHILS NFR BLD AUTO: 69.1 %
PLATELET # BLD AUTO: 243 10(3)UL (ref 150–450)
RBC # BLD AUTO: 3.9 X10(6)UL
WBC # BLD AUTO: 7.7 X10(3) UL (ref 4–11)

## 2025-02-25 PROCEDURE — 36415 COLL VENOUS BLD VENIPUNCTURE: CPT

## 2025-02-25 PROCEDURE — 85025 COMPLETE CBC W/AUTO DIFF WBC: CPT

## 2025-03-03 ENCOUNTER — TELEMEDICINE (OUTPATIENT)
Dept: OBGYN CLINIC | Facility: CLINIC | Age: 60
End: 2025-03-03
Payer: MEDICAID

## 2025-03-03 ENCOUNTER — VIRTUAL PHONE E/M (OUTPATIENT)
Dept: PAIN CLINIC | Facility: CLINIC | Age: 60
End: 2025-03-03
Payer: MEDICAID

## 2025-03-03 DIAGNOSIS — S22.41XA CLOSED FRACTURE OF MULTIPLE RIBS OF RIGHT SIDE, INITIAL ENCOUNTER: ICD-10-CM

## 2025-03-03 DIAGNOSIS — Z78.0 POSTMENOPAUSAL: ICD-10-CM

## 2025-03-03 DIAGNOSIS — M54.6 CHRONIC RIGHT-SIDED THORACIC BACK PAIN: ICD-10-CM

## 2025-03-03 DIAGNOSIS — M54.6 RIGHT-SIDED THORACIC BACK PAIN, UNSPECIFIED CHRONICITY: ICD-10-CM

## 2025-03-03 DIAGNOSIS — Z90.710 HISTORY OF HYSTERECTOMY: ICD-10-CM

## 2025-03-03 DIAGNOSIS — G58.8 INTERCOSTAL NEURALGIA: Primary | ICD-10-CM

## 2025-03-03 DIAGNOSIS — N95.2 VAGINAL ATROPHY: Primary | ICD-10-CM

## 2025-03-03 DIAGNOSIS — G89.29 CHRONIC RIGHT-SIDED THORACIC BACK PAIN: ICD-10-CM

## 2025-03-03 RX ORDER — MELOXICAM 15 MG/1
15 TABLET ORAL DAILY
Qty: 30 TABLET | Refills: 0 | Status: SHIPPED | OUTPATIENT
Start: 2025-03-03

## 2025-03-03 RX ORDER — ESTRADIOL 10 UG/1
10 TABLET, FILM COATED VAGINAL
Qty: 24 TABLET | Refills: 3 | Status: SHIPPED | OUTPATIENT
Start: 2025-03-03

## 2025-03-03 NOTE — PROGRESS NOTES
Virtual/Telephone     Julieta Goff verbally consents to a Virtual VIDEO visit service on 25.  Patient understands and accepts financial responsibility for any deductible, co-insurance and/or co-pays associated with this service.    Duration of the service: 30 minutes       Summary of topics discussed: Estrogen replacement therapy    [facility]  Obstetrics and Gynecology     Subjective:     HPI: Julieta Goff is a 59 year old  female with complaint of request for estrogen replacement therapy continuation. Patient reports History of a hysterectomy with oophorectomy in . She reports HRT for past 6 years. She reports vaginal dryness and irration. Worse after intercourse. She report vaginal insertion. She reports prior use of vaginal cream. She reports currently sexually active.       OBGYN, medication, allergies, PMH, PSH, social history and family history reviewed with patient.     OB History:  OB History    Para Term  AB Living   2 2 1 0 0 2   SAB IAB Ectopic Multiple Live Births   0 0 0 0 2      # Outcome Date GA Lbr Angel/2nd Weight Sex Type Anes PTL Lv   2 Term     M NORMAL SPONT   JAHAIRA   1 Para     M NORMAL SPONT   JAHAIRA       Gyne History:  LMP No LMP recorded. Patient has had a hysterectomy.           denies history of STDs.   ASCUS, HPV 16+ - vaginal pap smear noted on pap smear on 24.   Sexual history: Active? Yes    Meds:  Medications Ordered Prior to Encounter[1]    All:  Allergies[2]    PMH:  Past Medical History:    Allergic rhinitis    Anxiety    Arthritis    Asthma (HCC)    Depression    Esophageal reflux    Fatty liver    2023 ultrasound of abdomen    Hyperlipidemia    Hyperthyroidism    Renal insufficiency       PSH:  Past Surgical History:   Procedure Laterality Date    Colonoscopy      Hysterectomy      -approximate          Other surgical history      Eye surgery    Tubal ligation         Social History:  Social History      Socioeconomic History    Marital status:      Spouse name: Not on file    Number of children: Not on file    Years of education: Not on file    Highest education level: Not on file   Occupational History    Not on file   Tobacco Use    Smoking status: Never     Passive exposure: Never    Smokeless tobacco: Never   Vaping Use    Vaping status: Never Used   Substance and Sexual Activity    Alcohol use: Yes     Alcohol/week: 3.0 - 4.0 standard drinks of alcohol     Types: 3 - 4 Standard drinks or equivalent per week     Comment: occ    Drug use: Never    Sexual activity: Yes     Birth control/protection: Hysterectomy   Other Topics Concern    Caffeine Concern No    Exercise No    Seat Belt Yes    Special Diet No    Stress Concern Yes    Weight Concern No     Service No    Blood Transfusions No    Occupational Exposure No    Hobby Hazards No    Sleep Concern No    Back Care No    Bike Helmet No    Self-Exams No   Social History Narrative    Not on file     Social Drivers of Health     Food Insecurity: Not on file   Transportation Needs: Not on file   Stress: Not on file   Housing Stability: Not on file         Family History:  Family History   Problem Relation Age of Onset    Diabetes Father     Stroke Father     Depression Father     Obesity Sister        Review of Systems:  General: no complaints per category. See HPI for additional information.   Breast: no complaints per category. See HPI for additional information.   Respiratory: no complaints per category. See HPI for additional information.   Cardiovascular: no complaints per category. See HPI for additional information.   GI: no complaints per category. See HPI for additional information.   : no complaints per category. See HPI for additional information.   Heme: no complaints per category. See HPI for additional information.      Objective:   Vitals: not applicable due to telephone visit limitations     General: speaking in full sentences,  no increased work in breathing, AAO.x 3.   Pelvic exam: deferred due to telephone encounter limitations     Imaging:  Not available    Assessment:     Julieta Goff is a 59 year old  female with complaint of estrogen replacement therapy.       Plan:     Problem List Items Addressed This Visit          Genitourinary and Reproductive    Postmenopausal    Relevant Medications    Estradiol 10 MCG Vaginal Tab    History of hysterectomy    Relevant Medications    Estradiol 10 MCG Vaginal Tab     Other Visit Diagnoses       Vaginal atrophy    -  Primary    Relevant Medications    Estradiol 10 MCG Vaginal Tab              -Discussed additional estrogen replacement therapy including risk, benefits and alternatives to the to treatment.  Discussed with patient recommendation to switch from vaginal estrogen tablet to vaginal estrogen cream.  However, patient declines vaginal estrogen cream and states that she was unable to tolerate placement of vaginal cream medication.  She desires to continue with vaginal estrogen tablet.  Patient understands vaginal estrogen tablet might increase her risks associated with the risk of estrogen containing medication.  Patient is aware that estrogen containing medication increases risk of blood clots, stroke and estrogen sensitive malignancies including but not limited to breast cancer.  Patient desires to continue with her estrogen replacement therapy as previously prescribed.  She fully understands the risk of estrogen replacement therapy and accepts all risk associated with estrogen replacement therapy.  -A new prescription for estradiol 10 mcg vaginal tablets provided and encouraged patient to use 1 to 2 tablets/week and to maintain the lowest dose possible.  -Precautions provided      Total patient time was 30 minutes in evaluation, consultation, and coordination of care. This included face to face and non-face to face actions. The patient's questions and concerns were  addressed.     Please note that the following visit was completed using two-way, real-time interactive audio AND video communication.  This has been done in good negar to provide continuity of care in the best interest of the provider-patient relationship, due to the ongoing public health crisis/national emergency and because of restrictions of visitation.  There are limitations of this visit as no physical exam could be performed.  Every conscious effort was taken to allow for sufficient and adequate time.  This billing was spent on reviewing labs, medications, radiology tests and decision making.  Appropriate medical decision-making and tests are ordered as detailed in the plan of care above.     Karla Chew MD   [facility] - OBGYN    Discussed with patient that there will not be further notification of normal or benign results other than receiving results on [facility]. A [facility] message or telephone call will be placed by the physician and/or office staff if results are abnormal.     Note to patient and family   The 21st Century Cures Act makes medical notes available to patients in the interest of transparency.  However, please be advised that this is a medical document.  It is intended as rtmu-sp-psgy communication.  It is written and medical language may contain abbreviations or verbiage that are technical and unfamiliar.  It may appear blunt or direct.  Medical documents are intended to carry relevant information, facts as evident, and the clinical opinion of the practitioner.          This note could include assistance by Dragon voice recognition. Errors in content may be related to improper recognition by the system; efforts to review and correct have been done but errors may still exist.              [1]   Current Outpatient Medications on File Prior to Visit   Medication Sig Dispense Refill    MELOXICAM 15 MG Oral Tab TAKE 1 TABLET(15 MG) BY MOUTH DAILY 30 tablet 0    TRAZODONE 150 MG Oral Tab TAKE 1  TABLET(150 MG) BY MOUTH EVERY NIGHT AS NEEDED FOR SLEEP 90 tablet 1    LOSARTAN 50 MG Oral Tab TAKE 1 TABLET(50 MG) BY MOUTH TWICE DAILY 180 tablet 0    pantoprazole 40 MG Oral Tab EC Take 1 tablet (40 mg total) by mouth every morning before breakfast. 90 tablet 3    levothyroxine 75 MCG Oral Tab TAKE 1 TABLET(75 MCG) BY MOUTH BEFORE BREAKFAST 90 tablet 0    montelukast 10 MG Oral Tab Take 1 tablet (10 mg total) by mouth nightly. 90 tablet 0    ALPRAZolam 0.5 MG Oral Tab Take 1 tablet (0.5 mg total) by mouth daily as needed. 30 tablet 2    IBUPROFEN 600 MG Oral Tab TAKE 1 TABLET(600 MG) BY MOUTH EVERY 6 HOURS AS NEEDED FOR PAIN 40 tablet 0    Cyanocobalamin (B-12 OR) Take by mouth.      buPROPion  MG Oral Tablet 24 Hr TAKE 1 TABLET(300 MG) BY MOUTH DAILY 90 tablet 3    valACYclovir 1 G Oral Tab Take 1 tablet (1,000 mg total) by mouth as needed. 2x 12 21 tablet 1    fluconazole (DIFLUCAN) 150 MG Oral Tab Take 1 tablet (150 mg total) by mouth every third day. 2 tablet 0    cetirizine 10 MG Oral Tab Take 1 tablet (10 mg total) by mouth daily.      cholecalciferol 25 MCG (1000 UT) Oral Tab Take 1 tablet (1,000 Units total) by mouth daily.      SYMBICORT 80-4.5 MCG/ACT Inhalation Aerosol Inhale 2 puffs into the lungs 2 (two) times daily.      fluticasone propionate 50 MCG/ACT Nasal Suspension 1 spray in each nostril Nasally Once a day as needed      CRANBERRY OR Take 15 mg by mouth daily.      Lifitegrast (XIIDRA OP) Apply to eye 2 (two) times a day.      albuterol 108 (90 Base) MCG/ACT Inhalation Aero Soln Inhale 2 puffs into the lungs every 4 (four) hours as needed.      Ascorbic Acid (VITAMIN C) 100 MG Oral Tab Take 1 tablet (100 mg total) by mouth in the morning and 1 tablet (100 mg total) before bedtime.      zinc sulfate 220 (50 Zn) MG Oral Cap Take 25 mg by mouth daily.      Multiple Vitamins-Minerals (MULTI-VITAMIN/MINERALS) Oral Tab Take 1 tablet by mouth daily.       No current facility-administered  medications on file prior to visit.   [2]   Allergies  Allergen Reactions    Epinephrine OTHER (SEE COMMENTS)    Cats Claw, Uncaria Tomentosa RASH    Dander RASH     Dogs      Dust Mites RASH    Grass RASH    Mold RASH

## 2025-03-03 NOTE — PROGRESS NOTES
HPI:   Julieta Goff presents status post right ultrasound-guided paravertebral nerve blocks done on February 5, 2025.    Patient presents with complaints of Mid back pain.  Only on the right side and the right lateral rib cage region    The pain is described as moderate aching that is constant .  The patient’s activity level has remained the same since last visit.  The pain is worst unrelated to time of day.      Last procedure: Right sided ultrasound-guided paravertebral nerve blocks in the office    date: February 5, 2025    Percentage of relief experienced from the procedure: 100%    Duration of the relief: 4 to 5 days then slowly pain returned now back to baseline    The following activities will increase the patient’s pain: household chores, lifting, doing laundry, standing to do dishes    The following activities decrease the patient’s pain: limiting activity level    Functional Assessment: Patient reports that they are able to complete all of their ADL's such as eating, bathing, using the toilet, dressing and getting up from a bed or a chair independently.    Current Medications:  Current Outpatient Medications   Medication Sig Dispense Refill    MELOXICAM 15 MG Oral Tab TAKE 1 TABLET(15 MG) BY MOUTH DAILY 30 tablet 0    TRAZODONE 150 MG Oral Tab TAKE 1 TABLET(150 MG) BY MOUTH EVERY NIGHT AS NEEDED FOR SLEEP 90 tablet 1    LOSARTAN 50 MG Oral Tab TAKE 1 TABLET(50 MG) BY MOUTH TWICE DAILY 180 tablet 0    pantoprazole 40 MG Oral Tab EC Take 1 tablet (40 mg total) by mouth every morning before breakfast. 90 tablet 3    levothyroxine 75 MCG Oral Tab TAKE 1 TABLET(75 MCG) BY MOUTH BEFORE BREAKFAST 90 tablet 0    montelukast 10 MG Oral Tab Take 1 tablet (10 mg total) by mouth nightly. 90 tablet 0    ALPRAZolam 0.5 MG Oral Tab Take 1 tablet (0.5 mg total) by mouth daily as needed. 30 tablet 2    IBUPROFEN 600 MG Oral Tab TAKE 1 TABLET(600 MG) BY MOUTH EVERY 6 HOURS AS NEEDED FOR PAIN 40 tablet 0    Estradiol 10  MCG Vaginal Tab Place 10 mcg vaginally twice a week. 24 tablet 0    Cyanocobalamin (B-12 OR) Take by mouth.      buPROPion  MG Oral Tablet 24 Hr TAKE 1 TABLET(300 MG) BY MOUTH DAILY 90 tablet 3    valACYclovir 1 G Oral Tab Take 1 tablet (1,000 mg total) by mouth as needed. 2x 12 21 tablet 1    fluconazole (DIFLUCAN) 150 MG Oral Tab Take 1 tablet (150 mg total) by mouth every third day. 2 tablet 0    cetirizine 10 MG Oral Tab Take 1 tablet (10 mg total) by mouth daily.      cholecalciferol 25 MCG (1000 UT) Oral Tab Take 1 tablet (1,000 Units total) by mouth daily.      SYMBICORT 80-4.5 MCG/ACT Inhalation Aerosol Inhale 2 puffs into the lungs 2 (two) times daily.      fluticasone propionate 50 MCG/ACT Nasal Suspension 1 spray in each nostril Nasally Once a day as needed      CRANBERRY OR Take 15 mg by mouth daily.      Lifitegrast (XIIDRA OP) Apply to eye 2 (two) times a day.      albuterol 108 (90 Base) MCG/ACT Inhalation Aero Soln Inhale 2 puffs into the lungs every 4 (four) hours as needed.      Ascorbic Acid (VITAMIN C) 100 MG Oral Tab Take 1 tablet (100 mg total) by mouth in the morning and 1 tablet (100 mg total) before bedtime.      zinc sulfate 220 (50 Zn) MG Oral Cap Take 25 mg by mouth daily.      Multiple Vitamins-Minerals (MULTI-VITAMIN/MINERALS) Oral Tab Take 1 tablet by mouth daily.        Patient requires assistance with: No assistance required  Have you recently had any feelings of harming yourself or others? The patient's response was no.    Physical Exam:   There were no vitals taken for this visit.  Phone visit  VAS Pain Score: 7 /10  Radiology/Lab Test Reviewed: No new images to review  Lab Results   Component Value Date    WBC 7.7 02/25/2025    WBC 8.4 03/19/2024    WBC 6.8 02/27/2024   No results found for: \"HEMOGLOBIN\"  Lab Results   Component Value Date    .0 02/25/2025    .0 03/19/2024    .0 02/27/2024       Patient Education: Patient was advised to continue normal  activities as tolerated and was advised against any form of bedrest, since recent guidelines promote and encourage physical activity for improvment of functionality and overall pain.  (Family Practice Vol. 16, No. 1, 08-72Â© [employer] 1999 ).  Patient educated and verbalized understanding.  Medical Decision Making:   Diagnosis:    No diagnosis found.  Impression:   Julieta Goff is a 59 year old female, who complains of right sided lateral thoracic pain radiating to the right axillary region/mid axillary line since rib fractures 2/2024.  Patient reported that she was at her son's home and tripped and hit her right side and fractures right 7, 8, 9, 10 ribs/she is being treated at endocrine for OP with vit D/Ca+/zinc.     She is working with chiropractor  She has tried tylenol/ibuprofen/meloxicam no significant relief  She is status post paravertebral blocks February 5, 2025 on the right side in office with ultrasound.  She is reporting excellent 100% x 4 to 5 days but temporary relief.    Discussed with patient that since these rib fractures have been ongoing greater than of a year thoracic MRI would be necessary to evaluate thoracic pain further.  We did discuss right sided to her costal nerve blocks done in the procedure suite that potentially would result in staging towards radiofrequency however we can discuss that further after the thoracic MRI       Past Treatment Attempted/Patient’s Response:  Treatment up to this time has included:  Evaluation: Primary care, urgent care, chiropractor  NSAIDS: Prescription meloxicam no significant relief  Narcotic use: Not applicable  Physical therapy: Not applicable  Spinal injections: Applicable  Others: Chiropractic care, topical lidocaine      Total visit time: 30 minutes  More than 50% spent coordinating care, providing patient education, reviewing imaging, discussing conservative vs surgical treatment options and counseling.           Plan:   > Obtain  thoracic MRI  > Follow-up after MRI  > May consider intercostal nerve blocks if no finding on thoracic MRI  No orders of the defined types were placed in this encounter.      Meds & Refills for this Visit:  Requested Prescriptions      No prescriptions requested or ordered in this encounter       Imaging & Consults:  None    The patient indicates understanding of these issues and agrees to the plan.      ID#[id number]

## 2025-03-04 ENCOUNTER — TELEPHONE (OUTPATIENT)
Dept: FAMILY MEDICINE CLINIC | Facility: CLINIC | Age: 60
End: 2025-03-04

## 2025-03-04 NOTE — TELEPHONE ENCOUNTER
Patient called to ask when her last pap smear was done. Not in health maintenance, not sure where else to look. Would like a call back.

## 2025-03-11 ENCOUNTER — HOSPITAL ENCOUNTER (OUTPATIENT)
Dept: CT IMAGING | Age: 60
Discharge: HOME | End: 2025-03-11
Payer: MEDICAID

## 2025-03-11 DIAGNOSIS — R93.5 ABNORMAL CT OF THE ABDOMEN: ICD-10-CM

## 2025-03-11 PROCEDURE — 74177 CT ABD & PELVIS W/CONTRAST: CPT

## 2025-03-11 PROCEDURE — 71260 CT THORAX DX C+: CPT

## 2025-03-13 ENCOUNTER — TELEPHONE (OUTPATIENT)
Dept: PAIN CLINIC | Facility: CLINIC | Age: 60
End: 2025-03-13

## 2025-03-13 NOTE — TELEPHONE ENCOUNTER
Patient called stating that she is scheduled for an MRI on 3/28 but when she called her insurance, they advised her that they do not show an order in the system to have this done.

## 2025-03-13 NOTE — TELEPHONE ENCOUNTER
The patient was contacted at this time and informed that the reason her insurance does not have a record of this is because they engaged a third party to conduct the authorization. The patient was informed that the authorization was still ongoing and that the [facility] team will contact her to inform her if it was rejected. She would also receive a letter from the insurance company. Pt jose, and there are now no more questions.

## 2025-03-16 DIAGNOSIS — J30.1 ALLERGIC RHINITIS DUE TO POLLEN, UNSPECIFIED SEASONALITY: ICD-10-CM

## 2025-03-16 DIAGNOSIS — J45.40 MODERATE PERSISTENT ASTHMA WITHOUT COMPLICATION (HCC): ICD-10-CM

## 2025-03-20 RX ORDER — MONTELUKAST SODIUM 10 MG/1
10 TABLET ORAL NIGHTLY
Qty: 90 TABLET | Refills: 0 | Status: SHIPPED | OUTPATIENT
Start: 2025-03-20

## 2025-03-20 NOTE — TELEPHONE ENCOUNTER
Requesting Montelukast 10mg  LOV: 1/7/25 Telemedicine  RTC: prn  Last ACT/AAP: 2/12/24  Filled: 12/6/24 #90 with 0 refills    Future Appointments   Date Time Provider Department Center   3/28/2025  7:30 AM PF MRI RM1 (1.5T) [facility] [place]   4/7/2025  2:15 PM Karla Chew MD [facility] [address]     Asthma & COPD Medication Protocol [id number]/20/2025 12:00 PM   Protocol Details   ACT Score greater than or equal to 20    ACT recorded in the last 12 months    Appointment in past 6 or next 3 months    Medication is active on med list     Patient takes Rx for allergic rhinitis    Rx sent to pharmacy per protocol

## 2025-03-22 DIAGNOSIS — J45.40 MODERATE PERSISTENT ASTHMA WITHOUT COMPLICATION (HCC): ICD-10-CM

## 2025-03-22 DIAGNOSIS — E03.9 ACQUIRED HYPOTHYROIDISM: ICD-10-CM

## 2025-03-22 DIAGNOSIS — J30.1 ALLERGIC RHINITIS DUE TO POLLEN, UNSPECIFIED SEASONALITY: ICD-10-CM

## 2025-03-24 RX ORDER — LEVOTHYROXINE SODIUM 75 UG/1
75 TABLET ORAL
Qty: 90 TABLET | Refills: 0 | Status: SHIPPED | OUTPATIENT
Start: 2025-03-24

## 2025-03-24 RX ORDER — MONTELUKAST SODIUM 10 MG/1
10 TABLET ORAL NIGHTLY
Qty: 90 TABLET | Refills: 0 | OUTPATIENT
Start: 2025-03-24

## 2025-03-24 NOTE — TELEPHONE ENCOUNTER
Requesting Levothyroxine 75mcg  LOV: 1/7/25 telemedicine  RTC: prn  Last Relevant Labs: 12/19/24  Filled: 12/16/24 #90 with 0 refills    Future Appointments   Date Time Provider Department Center   3/28/2025  7:30 AM PF MRI RM1 (1.5T) PF MRI [place]   4/7/2025  2:15 PM Karla Chew MD [facility] [address]     Thyroid Medication Protocol [id number]/2025 08:03 AM   Protocol Details   TSH in past 12 months    Last TSH value is normal    In person appointment or virtual visit in the past 12 mos or appointment in next 3 mos    Medication is active on med list     Rx sent to pharmacy per protocol

## 2025-05-07 RX ORDER — VALACYCLOVIR HYDROCHLORIDE 1 G/1
TABLET, FILM COATED ORAL
Qty: 2 TABLET | Refills: 0 | Status: SHIPPED | OUTPATIENT
Start: 2025-05-07

## 2025-05-07 NOTE — TELEPHONE ENCOUNTER
Requesting Valacyclovir 1g  Last OV: 1/7/25 Telemedicine  RTC: not noted  Last Rx'd 5/2/24 #21 with 1 refill    Future Appointments   Date Time Provider Department Center   5/19/2025  3:00 PM Karla Chew MD [facility] [address]   5/21/2025  3:15 PM PF MRI RM1 (1.5T) PF MRI [place]       Non-protocol med:  Rx pended and routed for approval/denial

## 2025-05-15 DIAGNOSIS — I10 ESSENTIAL HYPERTENSION: ICD-10-CM

## 2025-05-15 RX ORDER — LOSARTAN POTASSIUM 50 MG/1
50 TABLET ORAL 2 TIMES DAILY
Qty: 180 TABLET | Refills: 0 | Status: SHIPPED | OUTPATIENT
Start: 2025-05-15

## 2025-05-21 ENCOUNTER — HOSPITAL ENCOUNTER (OUTPATIENT)
Dept: MRI IMAGING | Age: 60
Discharge: HOME | End: 2025-05-21
Attending: NURSE PRACTITIONER
Payer: MEDICAID

## 2025-05-21 DIAGNOSIS — M54.6 RIGHT-SIDED THORACIC BACK PAIN, UNSPECIFIED CHRONICITY: ICD-10-CM

## 2025-05-21 DIAGNOSIS — S22.41XA CLOSED FRACTURE OF MULTIPLE RIBS OF RIGHT SIDE, INITIAL ENCOUNTER: ICD-10-CM

## 2025-05-21 DIAGNOSIS — G58.8 INTERCOSTAL NEURALGIA: ICD-10-CM

## 2025-05-21 PROCEDURE — 72146 MRI CHEST SPINE W/O DYE: CPT | Performed by: NURSE PRACTITIONER

## 2025-06-04 ENCOUNTER — TELEPHONE (OUTPATIENT)
Dept: FAMILY MEDICINE CLINIC | Facility: CLINIC | Age: 60
End: 2025-06-04

## 2025-06-04 NOTE — TELEPHONE ENCOUNTER
Future Appointments   Date Time Provider Department Center   6/9/2025  9:30 AM Soraya Rey APRN [facility] [place]   6/9/2025  2:00 PM Padma Murcia APRN [id number]   7/9/2025  2:00 PM Karla Chew MD [facility] [address]   7/28/2025  2:30 PM Tung Do DO [facility] [address] [facility] [address]     Patient feels like her thyroid is off; gaining weight, hair loss, constipation, etc. She would like lab orders.

## 2025-06-04 NOTE — TELEPHONE ENCOUNTER
Patient sees endocrinology.  Was sent there for osteopenia in 5/2024.    LOV with Dr. Munoz on /1/3/2025.  #Hypothyroidism, unspecified type          Lab Results   Component Value Date     TSH 1.381 12/19/2024     T4F 1.3 12/19/2024   Currently on LT4 75 mcg daily with stable labs  Patient is compliant with current LT4 administration and is taking it appropriately   Discussed with pt the proper administration of LT4: ideally first thing in the morning on an empty stomach and taken only with water, separate from all other foods/beverages/medications by 30-60 minutes.    Repeat levels prior to follow up visit     RTC: Return in about 6 months (around 7/3/2025).     Patient is requesting lab work for PCP appointment on 7/28/2025.    Who is managing the patient's hypothyroidism?    Future Appointments   Date Time Provider Department Center   6/9/2025  9:30 AM Soraya Rey APRN [facility]   6/9/2025  2:00 PM Padma Murcia APRN [facility]   7/9/2025  2:00 PM Karla Chew MD [facility] [address]   7/28/2025  2:30 PM Tung Do DO [facility] [address] [facility] [address]

## 2025-06-05 ENCOUNTER — TELEPHONE (OUTPATIENT)
Facility: CLINIC | Age: 60
End: 2025-06-05

## 2025-06-05 NOTE — TELEPHONE ENCOUNTER
Charmaine Hurt results received and placed on your desk for review.  Do you want us to place a 3 year [employer] Recall?    Thank you

## 2025-06-09 ENCOUNTER — LAB ENCOUNTER (OUTPATIENT)
Dept: LAB | Age: 60
End: 2025-06-09
Attending: STUDENT IN AN ORGANIZED HEALTH CARE EDUCATION/TRAINING PROGRAM
Payer: MEDICAID

## 2025-06-09 DIAGNOSIS — M85.859 OSTEOPENIA OF NECK OF FEMUR, UNSPECIFIED LATERALITY: ICD-10-CM

## 2025-06-09 DIAGNOSIS — E03.9 HYPOTHYROIDISM, UNSPECIFIED TYPE: ICD-10-CM

## 2025-06-09 LAB
ALBUMIN SERPL-MCNC: 3.9 G/DL (ref 3.2–4.8)
ANION GAP SERPL CALC-SCNC: 8 MMOL/L (ref 0–18)
BUN BLD-MCNC: 15 MG/DL (ref 9–23)
CALCIUM BLD-MCNC: 9.2 MG/DL (ref 8.7–10.6)
CHLORIDE SERPL-SCNC: 105 MMOL/L (ref 98–112)
CO2 SERPL-SCNC: 28 MMOL/L (ref 21–32)
CREAT BLD-MCNC: 0.94 MG/DL (ref 0.55–1.02)
EGFRCR SERPLBLD CKD-EPI 2021: 69 ML/MIN/1.73M2 (ref 60–?)
GLUCOSE BLD-MCNC: 144 MG/DL (ref 70–99)
OSMOLALITY SERPL CALC.SUM OF ELEC: 295 MOSM/KG (ref 275–295)
PHOSPHATE SERPL-MCNC: 3.4 MG/DL (ref 2.4–5.1)
POTASSIUM SERPL-SCNC: 4.2 MMOL/L (ref 3.5–5.1)
SODIUM SERPL-SCNC: 141 MMOL/L (ref 136–145)
T4 FREE SERPL-MCNC: 1.2 NG/DL (ref 0.8–1.7)
TSI SER-ACNC: 0.78 UIU/ML (ref 0.55–4.78)

## 2025-06-09 PROCEDURE — 84439 ASSAY OF FREE THYROXINE: CPT

## 2025-06-09 PROCEDURE — 82523 COLLAGEN CROSSLINKS: CPT

## 2025-06-09 PROCEDURE — 80069 RENAL FUNCTION PANEL: CPT

## 2025-06-09 PROCEDURE — 84080 ASSAY ALKALINE PHOSPHATASES: CPT

## 2025-06-09 PROCEDURE — 84443 ASSAY THYROID STIM HORMONE: CPT

## 2025-06-09 PROCEDURE — 36415 COLL VENOUS BLD VENIPUNCTURE: CPT

## 2025-06-11 NOTE — TELEPHONE ENCOUNTER
Record reviewed & placed in scan bin.    Date: 5/19/2025  Location: [facility] customer insight  Lab: cologuard  Result: negative    GI RNs- please enter 3 year cologuard recall

## 2025-06-11 NOTE — TELEPHONE ENCOUNTER
Repeat cologuard in May 2028 per Charmaine EGAN.    Entered into Pt Outreach.    Health maintenance updated.

## 2025-06-11 NOTE — TELEPHONE ENCOUNTER
The patient has not read the [employer] message.    Left a message in her voicemail per release of information.    Informed that cologuard result and to repeat in 3 years per APN recommendations.    I asked the patient to call the office if she has questions.

## 2025-06-12 ENCOUNTER — PATIENT MESSAGE (OUTPATIENT)
Facility: CLINIC | Age: 60
End: 2025-06-12

## 2025-06-12 DIAGNOSIS — M85.859 OSTEOPENIA OF NECK OF FEMUR, UNSPECIFIED LATERALITY: Primary | ICD-10-CM

## 2025-06-12 DIAGNOSIS — E03.9 HYPOTHYROIDISM, UNSPECIFIED TYPE: ICD-10-CM

## 2025-06-12 LAB — ALKALINE PHOSPHATASE BONE SPECIFIC: 14.2 UG/L

## 2025-06-12 NOTE — TELEPHONE ENCOUNTER
Her TSH and free T4 are both appropriate and within range. I would not recommend any changes in her thyroid medications at this time.   She can repeat her glucose when fasting prior to her 7/15 follow up  I would recommend she follows up with PCP regarding her symptoms

## 2025-06-17 ENCOUNTER — VIRTUAL PHONE E/M (OUTPATIENT)
Dept: PAIN CLINIC | Facility: CLINIC | Age: 60
End: 2025-06-17
Payer: MEDICAID

## 2025-06-17 DIAGNOSIS — G58.8 INTERCOSTAL NEURALGIA: Primary | ICD-10-CM

## 2025-06-17 DIAGNOSIS — M54.6 RIGHT-SIDED THORACIC BACK PAIN, UNSPECIFIED CHRONICITY: ICD-10-CM

## 2025-06-17 DIAGNOSIS — S22.41XA CLOSED FRACTURE OF MULTIPLE RIBS OF RIGHT SIDE, INITIAL ENCOUNTER: ICD-10-CM

## 2025-06-17 LAB — C-TELOPEPTIDE: 484 PG/ML

## 2025-06-17 PROCEDURE — 99213 OFFICE O/P EST LOW 20 MIN: CPT | Performed by: NURSE PRACTITIONER

## 2025-06-17 NOTE — PROGRESS NOTES
HPI:   Julieta Goff presents with complaints of right sided rib cage pain.    The pain is described as mild aching, soreness that is intermittent.  The patient’s activity level has increased since last visit.  The pain is worst in the late evening.    Changes in condition/history since last visit: Patient is presenting today to follow-up on her thoracic MRI    Last procedure: Right sided ultrasound-guided paravertebral nerve blocks    date: 2/5/2025    Percentage of relief experienced from the procedure: 100%    Duration of the relief: 5 days then pain slowly starting to return patient is reporting that she does not feel like it is all the way back to baseline.  She feels that the pain is much less constant but she is still able to provoke it with activities such as vacuuming and lifting.  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  She does feel that nonsteroidal anti-inflammatories do help the pain and if it is very flared up she uses her compounded topical cream that has been effective for her      Functional Assessment: Patient reports that they are able to complete all of their ADL's such as eating, bathing, using the toilet, dressing and getting up from a bed or a chair independently.    Current Medications:  Current Medications[1]   Patient requires assistance with: No assistance required      Have you recently had any feelings of harming yourself or others? The patient's response was no.    Physical Exam:   There were no vitals taken for this visit.  On visit no vitals  VAS Pain Score:  4/10  Radiology/Lab Test Reviewed:   Reviewed thoracic MRI with patient today  PROCEDURE:  MRI SPINE THORACIC (CPT=72146)     LOCATION:  [facility]       COMPARISON:  [facility], CT, CT CHEST+ABDOMEN+PELVIS(ALL CNTRST ONLY)(CPT=71260/95122), 3/11/2025, 3:16 PM.     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  Images were performed without contrast.     PATIENT STATED HISTORY: (As transcribed by Technologist)  Right side pain, rib fracture a year ago, continued pain.         FINDINGS:    CORD:  Normal caliber, contour, and signal.  The conus terminates at a normal level.    BONY STRUCTURES:  Normal alignment without significant spondylosis or scoliosis.    DISCS:  Minimal disc bulges throughout the thoracic spine are noted.  No high-grade spinal canal stenosis.  No neural foraminal stenosis.  PARASPINAL:  Normal with no visible mass.                     Impression   CONCLUSION:  Minimal disc bulges throughout the thoracic spine.  No high-grade spinal canal stenosis.           Dictated by (CST): Alfred Doyle MD on 5/21/2025 at 3:41 PM      Finalized by (CST): Alfred Doyle MD on 5/21/2025 at 3:42 PM       Result History    MRI SPINE THORACIC (CPT=72146) (Order #[id number]) on 5/21/2025 - Order Result History Report  Signed by    Signed Time Phone Pager   Alfred Doyle MD 5/21/2025 15:42 [phone]        MRI SPINE THORACIC (CPT=72146): Result Notes     Soraya Rey, BRENT  5/21/2025  6:25 PM CDT       No significant findings       Lab Results   Component Value Date    WBC 7.7 02/25/2025    WBC 8.4 03/19/2024    WBC 6.8 02/27/2024   No results found for: \"HEMOGLOBIN\"  Lab Results   Component Value Date    .0 02/25/2025    .0 03/19/2024    .0 02/27/2024           Patient Education: Patient was advised to continue normal activities as tolerated and was advised against any form of bedrest, since recent guidelines promote and encourage physical activity for improvment of functionality and overall pain.  (Family Practice Vol. 16, No. 1, 18-60Â© [employer] 1999 ), Patient was given brochure,website  information, and handouts., Patient was shown interactive content, shown and explained procedure on spinal model..  Patient educated and verbalized understanding.  Medical Decision Making:   Diagnosis:    Encounter Diagnoses   Name Primary?    Intercostal neuralgia Yes    Closed fracture of multiple ribs of right side, initial encounter     Right-sided thoracic back pain, unspecified chronicity      Impression:   Patient is a 59-year-old female with a history of fall in February 2024 resulting in acute right rib fractures 789 and 10.  She has completed conservative treatment including over-the-counter medication, prescription meloxicam, treatment with vitamin D and calcium Ensing through endocrinology however pain have persisted.  She was seen in the office January 28, 2025 for an evaluation and further recommendations.  She did undergo right paravertebral blocks and did report 100% relief for approximately 4 to 5 days and then pain started to slowly return.  At her follow-up visit we did discuss obtaining a thoracic MRI to ensure there was no foraminal narrowing or disc bulge that was resulting in her ongoing pain as it has been greater than 1 year of symptoms.  She is here today for follow-up.  Thoracic MRI did not reveal any central or foraminal narrowing.  Patient does report that her pain has slowly improved and is much less constant.  She is finding relief with 600 mg ibuprofen and if it flares she does use her topical.  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  Discussed with patient I will place an order for her to have the blocks repeated and we can follow-up on an as-needed basis.    We did discuss neuropathic medications orally such as gabapentin and pregabalin however patient is trying to avoid escalating any further  medications.    We also discussed intervertebral blocks and possible radiofrequency ablation however patient would like to remain conservative and not be aggressive with injections and procedures if she can continue to build up relief with an office ultrasound-guided paravertebral blocks.  See full plan below    Plan:   > Recommend repeated right paravertebral blocks T8-T9 level in office with ultrasound guidance  > Continue all conservative treatments  > Would consider neuropathic medications and escalation to intercostal nerve blocks standing towards possible radiofrequency if symptoms progress  > Follow-up on an as needed basis after blocks  No orders of the defined types were placed in this encounter.      Meds & Refills for this Visit:  Requested Prescriptions      No prescriptions requested or ordered in this encounter       Imaging & Consults:  None    The patient indicates understanding of these issues and agrees to the plan.      ID#[id number]             [1]   Current Outpatient Medications   Medication Sig Dispense Refill    LOSARTAN 50 MG Oral Tab TAKE 1 TABLET(50 MG) BY MOUTH TWICE DAILY 180 tablet 0    VALACYCLOVIR 1 G Oral Tab TAKE TWO TABLETS BY MOUTH AS ONE DOSE 2 tablet 0    LEVOTHYROXINE 75 MCG Oral Tab TAKE 1 TABLET(75 MCG) BY MOUTH BEFORE BREAKFAST 90 tablet 0    MONTELUKAST 10 MG Oral Tab TAKE 1 TABLET(10 MG) BY MOUTH EVERY NIGHT 90 tablet 0    MELOXICAM 15 MG Oral Tab TAKE 1 TABLET(15 MG) BY MOUTH DAILY 30 tablet 0    Estradiol 10 MCG Vaginal Tab Place 10 mcg vaginally twice a week. 24 tablet 3    TRAZODONE 150 MG Oral Tab TAKE 1 TABLET(150 MG) BY MOUTH EVERY NIGHT AS NEEDED FOR SLEEP 90 tablet 1    pantoprazole 40 MG Oral Tab EC Take 1 tablet (40 mg total) by mouth every morning before breakfast. 90 tablet 3    ALPRAZolam 0.5 MG Oral Tab Take 1 tablet (0.5 mg total) by mouth daily as needed. 30 tablet 2    IBUPROFEN 600 MG Oral Tab TAKE 1 TABLET(600 MG) BY MOUTH EVERY 6 HOURS AS NEEDED FOR PAIN 40  tablet 0    Cyanocobalamin (B-12 OR) Take by mouth.      buPROPion  MG Oral Tablet 24 Hr TAKE 1 TABLET(300 MG) BY MOUTH DAILY 90 tablet 3    fluconazole (DIFLUCAN) 150 MG Oral Tab Take 1 tablet (150 mg total) by mouth every third day. 2 tablet 0    cetirizine 10 MG Oral Tab Take 1 tablet (10 mg total) by mouth daily.      cholecalciferol 25 MCG (1000 UT) Oral Tab Take 1 tablet (1,000 Units total) by mouth daily.      SYMBICORT 80-4.5 MCG/ACT Inhalation Aerosol Inhale 2 puffs into the lungs 2 (two) times daily.      fluticasone propionate 50 MCG/ACT Nasal Suspension 1 spray in each nostril Nasally Once a day as needed      CRANBERRY OR Take 15 mg by mouth daily.      Lifitegrast (XIIDRA OP) Apply to eye 2 (two) times a day.      albuterol 108 (90 Base) MCG/ACT Inhalation Aero Soln Inhale 2 puffs into the lungs every 4 (four) hours as needed.      Ascorbic Acid (VITAMIN C) 100 MG Oral Tab Take 1 tablet (100 mg total) by mouth in the morning and 1 tablet (100 mg total) before bedtime.      zinc sulfate 220 (50 Zn) MG Oral Cap Take 25 mg by mouth daily.      Multiple Vitamins-Minerals (MULTI-VITAMIN/MINERALS) Oral Tab Take 1 tablet by mouth daily.

## 2025-06-18 ENCOUNTER — PATIENT MESSAGE (OUTPATIENT)
Facility: CLINIC | Age: 60
End: 2025-06-18

## 2025-06-19 ENCOUNTER — TELEPHONE (OUTPATIENT)
Dept: PAIN CLINIC | Facility: CLINIC | Age: 60
End: 2025-06-19

## 2025-06-19 NOTE — TELEPHONE ENCOUNTER
Contacted patient informed that insurance did not require pre certification for injection. Offered patient to schedule injection. Patient VU and agreed.     Scheduled patient for In Office -Right (T8-T9) paravertebral blocks w/ultrasound on 06/30/25 @ 02:45 PM.

## 2025-06-19 NOTE — TELEPHONE ENCOUNTER
Prior authorization request completed for: right T8,T9 paravertebral blocks  with ultrasound   Authorization #no auth needed   Pre-D: no  Exclusions/Restrictions: no  Covered Benefit: yes  Authorization dates: n/a  CPT codes approved: 38035,60196  Number of visits/dates of service approved: 1  Physician: brendan  Location: office  Call Ref#: [id number]  Representative Name: [employer]   Insurance Carrier: [phone]    Patient can be scheduled. Routed to Navigator.

## 2025-06-20 NOTE — TELEPHONE ENCOUNTER
Pt is requesting refill of Estradiol vag tab 10mg twice weekly. PCP was filling this for pt but would like OB/GYN to manage this. Last annual OV 6/11/24. Last Amisha 12/8/23 & pt has Amisha appt 1/18/25.    show

## 2025-06-23 ENCOUNTER — TELEPHONE (OUTPATIENT)
Facility: CLINIC | Age: 60
End: 2025-06-23

## 2025-06-23 NOTE — TELEPHONE ENCOUNTER
Jamie Montano    Called and spoke to the patient, date of birth and name verified.    As per the patient, she has been experiencing intermittent abdominal cramping, bloating, gas and fullness and feels abdomen is distended for a few weeks now.    Denies abdominal pain at present but feels bloated.    She described she had a good bowel movement today.    Denies fever/chills, vomiting. Cramping is sometimes associated with nausea. Appetite varies but she is gaining weight.    She is requesting a phone visit to discuss the above symptoms if ok. She lives in [place].  The patient knows that I will call if APN needs to see her in the office.    Advised ER evaluation if having severe abdominal ramping which she agrees and understand.    Thank you      Your Appointments      Friday June 27, 2025 2:00 PM  Virtual Phone Visit with BRENT Zapata  [facility] ([address] [phone]   You have been scheduled for a Virtual Telephone visit with your provider. Please be available at your phone so that your physician can contact you, and be prepared with any questions or concerns. As always, your health is our priority.     We suggest confirming with your insurance regarding coverage prior to your appointment as some payors may no longer cover telephone visits. Depending on your insurance you may also be billed a copay at a later time.

## 2025-06-23 NOTE — TELEPHONE ENCOUNTER
Patient has constipation and bloating and requesting virtual visit due to living far away in [place]. Please call at [phone],thanks.

## 2025-06-24 NOTE — TELEPHONE ENCOUNTER
We have previously communicated to this patient that the next visit must be a in person visit to include physical exam.  The previous 2 visits were virtual.  I would like to see her in office to continue care. Last in person visit 2023.   Please cancel virtual visit and ask patient when she can come in person.     Charmaine Gorman, APRN

## 2025-06-24 NOTE — TELEPHONE ENCOUNTER
Called and spoke to the patient, date of birth and name verified.    APN message relayed.    As per the patient, she works until 1:30 pm, she is requesting appointment after 2:30 pm or 3 pm. Next availability is on 7/17 which she declined.    Offered 7/2 at 2 pm, the patient declined.    She needs to check her work schedule, she will call back to reschedule.    She knows the virtual phone appointment will be canceled.

## 2025-06-30 ENCOUNTER — OFFICE VISIT (OUTPATIENT)
Dept: PAIN CLINIC | Facility: CLINIC | Age: 60
End: 2025-06-30
Payer: MEDICAID

## 2025-06-30 ENCOUNTER — NURSE ONLY (OUTPATIENT)
Dept: PAIN CLINIC | Facility: CLINIC | Age: 60
End: 2025-06-30
Payer: MEDICAID

## 2025-06-30 VITALS
WEIGHT: 114 LBS | BODY MASS INDEX: 22 KG/M2 | OXYGEN SATURATION: 98 % | SYSTOLIC BLOOD PRESSURE: 144 MMHG | HEART RATE: 90 BPM | DIASTOLIC BLOOD PRESSURE: 72 MMHG

## 2025-06-30 DIAGNOSIS — M54.6 RIGHT-SIDED THORACIC BACK PAIN, UNSPECIFIED CHRONICITY: Primary | ICD-10-CM

## 2025-06-30 DIAGNOSIS — G58.8 INTERCOSTAL NEURALGIA: Primary | ICD-10-CM

## 2025-06-30 PROCEDURE — 64461 PVB THORACIC SINGLE INJ SITE: CPT | Performed by: ANESTHESIOLOGY

## 2025-06-30 RX ORDER — TRIAMCINOLONE ACETONIDE 40 MG/ML
40 INJECTION, SUSPENSION INTRA-ARTICULAR; INTRAMUSCULAR ONCE
Status: COMPLETED | OUTPATIENT
Start: 2025-06-30 | End: 2025-06-30

## 2025-06-30 RX ORDER — LIDOCAINE HYDROCHLORIDE 10 MG/ML
10 INJECTION, SOLUTION INFILTRATION; PERINEURAL ONCE
Status: COMPLETED | OUTPATIENT
Start: 2025-06-30 | End: 2025-06-30

## 2025-06-30 RX ORDER — BUPIVACAINE HYDROCHLORIDE 5 MG/ML
9 INJECTION, SOLUTION EPIDURAL; INTRACAUDAL; PERINEURAL ONCE
Status: COMPLETED | OUTPATIENT
Start: 2025-06-30 | End: 2025-06-30

## 2025-06-30 NOTE — PROGRESS NOTES
Timeout completed prior to procedure @ 2787.  Participants present for timeout:  Dr. Powers, RN Sonja , and patient.

## 2025-06-30 NOTE — PROGRESS NOTES
Patient presents in office today with reported pain in thoracic region    Current pain level reported = 6/10    Last reported dose of nothing today      Narcotic Contract renewal none    Urine Drug screen none

## 2025-06-30 NOTE — PATIENT INSTRUCTIONS
Refill policies:    Allow 2-3 business days for refills; controlled substances may take longer.  Contact your pharmacy at least 5 days prior to running out of medication and have them send an electronic request or submit request through the “request refill” option in your [employer] account.  Refills are not addressed on weekends; covering physicians do not authorize routine medications on weekends.  No narcotics or controlled substances are refilled after noon on Fridays or by on call physicians.  By law, narcotics must be electronically prescribed.  A 30 day supply with no refills is the maximum allowed.  If your prescription is due for a refill, you may be due for a follow up appointment.  To best provide you care, patients receiving routine medications need to be seen at least once a year.  Patients receiving narcotic/controlled substance medications need to be seen at least once every 3 months.  In the event that your preferred pharmacy does not have the requested medication in stock (e.g. Backordered), it is your responsibility to find another pharmacy that has the requested medication available.  We will gladly send a new prescription to that pharmacy at your request.    Scheduling Tests:    If your physician has ordered radiology tests such as MRI or CT scans, please contact Central Scheduling at [phone] right away to schedule the test.  Once scheduled, the [facility] Centralized Referral Team will work with your insurance carrier to obtain pre-certification or prior authorization.  Depending on your insurance carrier, approval may take 3-10 days.  It is highly recommended patients assure they have received an authorization before having a test performed.  If test is done without insurance authorization, patient may be responsible for the entire amount billed.      Precertification and Prior Authorizations:  If your physician has recommended that you have a procedure or additional testing performed the [facility]  Centralized Referral Team will contact your insurance carrier to obtain pre-certification or prior authorization.    You are strongly encouraged to contact your insurance carrier to verify that your procedure/test has been approved and is a COVERED benefit.  Although the [facility] Centralized Referral Team does its due diligence, the insurance carrier gives the disclaimer that \"Although the procedure is authorized, this does not guarantee payment.\"    Ultimately the patient is responsible for payment.   Thank you for your understanding in this matter.  Paperwork Completion:  If you require FMLA or disability paperwork for your recovery, please make sure to either drop it off or have it faxed to our office at [phone]. Be sure the form has your name and date of birth on it.  The form will be faxed to our Forms Department and they will complete it for you.  There is a 25$ fee for all forms that need to be filled out.  Please be aware there is a 10-14 day turnaround time.  You will need to sign a release of information (STAR) form if your paperwork does not come with one.  You may call the Forms Department with any questions at [phone].  Their fax number is [phone].

## 2025-07-06 DIAGNOSIS — Z51.81 THERAPEUTIC DRUG MONITORING: ICD-10-CM

## 2025-07-06 DIAGNOSIS — F43.9 STRESS AT HOME: ICD-10-CM

## 2025-07-06 DIAGNOSIS — F33.1 MAJOR DEPRESSIVE DISORDER, RECURRENT EPISODE, MODERATE WITH ANXIOUS DISTRESS (HCC): ICD-10-CM

## 2025-07-07 ENCOUNTER — TELEPHONE (OUTPATIENT)
Dept: OBGYN CLINIC | Facility: CLINIC | Age: 60
End: 2025-07-07

## 2025-07-07 NOTE — TELEPHONE ENCOUNTER
Patient is scheduled to have her annual 7/9 and she wants to know If she can still have her pap test, because she has been using Monistat for yeast infection. Patient is pretty sure that she has to have her pap test during her OV . Please advise

## 2025-07-07 NOTE — TELEPHONE ENCOUNTER
Called and spoke with pt.    Pt. states she had a yeast infection and used the 1 day Monistat for her yeast infections yesterday ,Sunday 7/6/25 .           Pt. Is scheduled for  her annual with Dr. Chew for Wed. 7/9/25 and asked if she can still have her pap done .  Pt. had pap done last year along with a colposcopy with  6/11/24       Colposcopy Procedure Note     Date of Procedure: 06/17/24     Indications: 58 year old y/o female with ASCUS, HPV 16+ - vaginal pap smear noted on pap smear on 03/21/24. History of a hysterectomy with oophorectomy in 2015. Previously diagnosed with HPV. Hysterectomy was 2/2 menorrhagia. Hx of precancerous cells in cervix perhaps in her 20s.      Pre-procedure diagnosis:   ASCUS, HPV 16+      Post-procedure diagnosis:  ASCUS, HPV 16+     Spoke with  in office today to see if pt. would be able to have her pap done on Wed. at her annual especially with her pap result as noted above or if recent monistat would interfere with results of pap .  Dr. Nye said it should be fine to cont. with annual and have pap on Wed..  Called and spoke with pt. And told her recommendations.  Told pt. would call her if  had other recommendations.    Please advise. If you agree.  Thanks

## 2025-07-09 ENCOUNTER — OFFICE VISIT (OUTPATIENT)
Dept: OBGYN CLINIC | Facility: CLINIC | Age: 60
End: 2025-07-09
Payer: MEDICAID

## 2025-07-09 VITALS
HEIGHT: 62 IN | WEIGHT: 116 LBS | HEART RATE: 77 BPM | SYSTOLIC BLOOD PRESSURE: 134 MMHG | DIASTOLIC BLOOD PRESSURE: 80 MMHG | BODY MASS INDEX: 21.35 KG/M2

## 2025-07-09 DIAGNOSIS — Z12.4 SCREENING FOR CERVICAL CANCER: ICD-10-CM

## 2025-07-09 DIAGNOSIS — Z12.31 ENCOUNTER FOR SCREENING MAMMOGRAM FOR MALIGNANT NEOPLASM OF BREAST: ICD-10-CM

## 2025-07-09 DIAGNOSIS — N76.0 VAGINITIS AND VULVOVAGINITIS: ICD-10-CM

## 2025-07-09 DIAGNOSIS — Z01.419 WELL WOMAN EXAM WITH ROUTINE GYNECOLOGICAL EXAM: Primary | ICD-10-CM

## 2025-07-09 DIAGNOSIS — N95.2 VAGINAL ATROPHY: ICD-10-CM

## 2025-07-09 PROCEDURE — 81514 NFCT DS BV&VAGINITIS DNA ALG: CPT | Performed by: OBSTETRICS & GYNECOLOGY

## 2025-07-09 PROCEDURE — 99396 PREV VISIT EST AGE 40-64: CPT | Performed by: OBSTETRICS & GYNECOLOGY

## 2025-07-09 RX ORDER — BUPROPION HYDROCHLORIDE 300 MG/1
300 TABLET ORAL DAILY
Qty: 90 TABLET | Refills: 0 | Status: SHIPPED | OUTPATIENT
Start: 2025-07-09

## 2025-07-09 RX ORDER — ESTRADIOL 10 UG/1
10 TABLET, FILM COATED VAGINAL
Qty: 24 TABLET | Refills: 3 | Status: SHIPPED | OUTPATIENT
Start: 2025-07-10 | End: 2025-08-09

## 2025-07-09 NOTE — TELEPHONE ENCOUNTER
Requesting Bupropion 300mg  LOV: 1/7/25  RTC: prn  Last Relevant Labs:   Filled: 5/7/24 #90 with 3 refills    Future Appointments   Date Time Provider Department Center   7/15/2025  9:00 AM Beverley Munoz DO [id number]   7/15/2025  2:30 PM Padma Murcia APRN [facility]   7/18/2025  2:00 PM Shiv Powers MD [facility]   7/28/2025  2:30 PM Tung Do DO [facility] [address] [facility] [address]     Psychiatric Non-Scheduled (Anti-Anxiety) Nyckbl6807/09/2025 04:03 PM   Protocol Details   In person appointment or virtual visit in the past 6 mos or appointment in next 3 mos    Depression Screening completed within the past 12 months    Medication is active on med list     Rx sent to pharmacy per protocol

## 2025-07-09 NOTE — PROGRESS NOTES
[facility]  Obstetrics and Gynecology  History & Physical    CC: Patient presents for a well woman exam     Subjective:     HPI: Julieta Goff is a 59 year old  female here for a well women exam. Patient reports doing ok but has noted increased fatigue, dry skin, hair loss and GI issues with bloating. History of a hysterectomy with oophorectomy in . She is currently on HRT with estradiol 10 mcg vaginal tablets. She applies vaginal estrogen cream to urethra per urologist. Reports yeast infection and did Monostat OTC x 1 day.     OB History:  OB History    Para Term  AB Living   2 2 1 0 0 2   SAB IAB Ectopic Multiple Live Births   0 0 0 0 2      # Outcome Date GA Lbr Angel/2nd Weight Sex Type Anes PTL Lv   2 Term     M NORMAL SPONT   JAHAIRA   1 Para     M NORMAL SPONT   JAHAIRA       Gyne History:  Hx Prior Abnormal Pap: Yes  Pap Date: 24  Pap Result Notes: EPITHELIAL CELL ABNORMALITY. ATYPICAL SQUAMOUS CELLS OF UNDETERMINED SIGNIFICANCE (ASC-US). FUNGAL ORGANISMS MORPHOLOGICALLY CONSISTENT WITH CAMILLA SPECIES ARE PRESENT HPV+  Follow Up Recommendation: repeat Pap  No LMP recorded. Patient has had a hysterectomy.  ASCUS, HPV 16+ - vaginal pap smear noted on pap smear on 24.   denies history of STDs (non-HPV).       Meds:  Current Outpatient Medications on File Prior to Visit   Medication Sig Dispense Refill    LOSARTAN 50 MG Oral Tab TAKE 1 TABLET(50 MG) BY MOUTH TWICE DAILY 180 tablet 0    VALACYCLOVIR 1 G Oral Tab TAKE TWO TABLETS BY MOUTH AS ONE DOSE 2 tablet 0    LEVOTHYROXINE 75 MCG Oral Tab TAKE 1 TABLET(75 MCG) BY MOUTH BEFORE BREAKFAST 90 tablet 0    MONTELUKAST 10 MG Oral Tab TAKE 1 TABLET(10 MG) BY MOUTH EVERY NIGHT 90 tablet 0    MELOXICAM 15 MG Oral Tab TAKE 1 TABLET(15 MG) BY MOUTH DAILY 30 tablet 0    Estradiol 10 MCG Vaginal Tab Place 10 mcg vaginally twice a week. 24 tablet 3    TRAZODONE 150 MG Oral Tab TAKE 1 TABLET(150 MG) BY MOUTH EVERY NIGHT AS NEEDED FOR  SLEEP 90 tablet 1    pantoprazole 40 MG Oral Tab EC Take 1 tablet (40 mg total) by mouth every morning before breakfast. 90 tablet 3    ALPRAZolam 0.5 MG Oral Tab Take 1 tablet (0.5 mg total) by mouth daily as needed. 30 tablet 2    IBUPROFEN 600 MG Oral Tab TAKE 1 TABLET(600 MG) BY MOUTH EVERY 6 HOURS AS NEEDED FOR PAIN 40 tablet 0    Cyanocobalamin (B-12 OR) Take by mouth.      buPROPion  MG Oral Tablet 24 Hr TAKE 1 TABLET(300 MG) BY MOUTH DAILY 90 tablet 3    fluconazole (DIFLUCAN) 150 MG Oral Tab Take 1 tablet (150 mg total) by mouth every third day. 2 tablet 0    cetirizine 10 MG Oral Tab Take 1 tablet (10 mg total) by mouth daily.      cholecalciferol 25 MCG (1000 UT) Oral Tab Take 1 tablet (1,000 Units total) by mouth daily.      SYMBICORT 80-4.5 MCG/ACT Inhalation Aerosol Inhale 2 puffs into the lungs 2 (two) times daily.      fluticasone propionate 50 MCG/ACT Nasal Suspension 1 spray in each nostril Nasally Once a day as needed      CRANBERRY OR Take 15 mg by mouth daily.      Lifitegrast (XIIDRA OP) Apply to eye 2 (two) times a day.      albuterol 108 (90 Base) MCG/ACT Inhalation Aero Soln Inhale 2 puffs into the lungs every 4 (four) hours as needed.      Ascorbic Acid (VITAMIN C) 100 MG Oral Tab Take 1 tablet (100 mg total) by mouth in the morning and 1 tablet (100 mg total) before bedtime.      zinc sulfate 220 (50 Zn) MG Oral Cap Take 25 mg by mouth daily.      Multiple Vitamins-Minerals (MULTI-VITAMIN/MINERALS) Oral Tab Take 1 tablet by mouth daily.       No current facility-administered medications on file prior to visit.       All:  Allergies   Allergen Reactions    Epinephrine OTHER (SEE COMMENTS)    Cats Claw, Uncaria Tomentosa RASH    Dander RASH     Dogs      Dust Mites RASH    Grass RASH    Mold RASH       PMH:  Past Medical History:    Allergic rhinitis    Anxiety    Arthritis    Asthma (HCC)    Constipation    Depression    Esophageal reflux    Fatty liver    7/27/2023 ultrasound of  abdomen    Fetal and  jaundice    Hemorrhoids    Hyperlipidemia    Hyperthyroidism    IBS (irritable bowel syndrome)    Renal insufficiency    UTI (urinary tract infection)       Immunization History:   Immunization History   Administered Date(s) Administered    Covid-19 Vaccine Pfizer 30 mcg/0.3 ml 2021    Covid-19 Vaccine Pfizer Bivalent 30mcg/0.3mL 2021, 2021    Fluarix 6 Months And Older 0.5 ml prefilled syringe ([id number]) 10/31/2022    Flucelvax Influenza vaccine, trivalent (ccIIV3), 0.5mL IM 10/12/2023    Influenza 10/01/2019    Pneumococcal (Prevnar 13) 10/01/2019    Pneumovax 23 2015    TDAP 2023    Zoster Vaccine Recombinant Adjuvanted (Shingrix) 10/24/2019, 2020       PSH:  Past Surgical History:   Procedure Laterality Date    Colonoscopy      Hysterectomy      -          Other surgical history      Eye surgery    Total abdom hysterectomy      Tubal ligation         Social History:  Social History     Socioeconomic History    Marital status:      Spouse name: Not on file    Number of children: Not on file    Years of education: Not on file    Highest education level: Not on file   Occupational History    Not on file   Tobacco Use    Smoking status: Never     Passive exposure: Never    Smokeless tobacco: Never   Vaping Use    Vaping status: Never Used   Substance and Sexual Activity    Alcohol use: Yes     Alcohol/week: 3.0 - 4.0 standard drinks of alcohol     Types: 3 - 4 Standard drinks or equivalent per week     Comment: occ    Drug use: Never    Sexual activity: Yes     Partners: Male     Birth control/protection: Hysterectomy   Other Topics Concern    Caffeine Concern No    Exercise No    Seat Belt Yes    Special Diet No    Stress Concern Yes    Weight Concern No     Service No    Blood Transfusions No    Occupational Exposure No    Hobby Hazards No    Sleep Concern No    Back Care No    Bike Helmet No    Self-Exams No   Social  History Narrative    Not on file     Social Drivers of Health     Food Insecurity: Not on file   Transportation Needs: Not on file   Stress: Not on file   Housing Stability: Not on file         Patient feels unsafe or threatened?:  denies    Abuse:  denies physical, sexual or mental.     Family History:  Family History   Problem Relation Age of Onset    Diabetes Father     Stroke Father     Depression Father     Colon Polyps Father     Obesity Sister        Health maintenance:  Mammogram (age 38 and q1-2yr): 01/2025  Impression   CONCLUSION:       BI-RADS CATEGORY:    DIAGNOSTIC CATEGORY 2 - BENIGN FINDING:  Vascular calcifications.     RECOMMENDATIONS:    ROUTINE MAMMOGRAM AND CLINICAL EVALUATION IN 12 MONTHS.     Colonoscopy (age 43 and q10yr): last year and normal per patient     Review of Systems:  General: no complaints per category. See HPI for additional information.   Breast: no complaints per category. See HPI for additional information.   Respiratory: no complaints per category. See HPI for additional information.   Cardiovascular: no complaints per category. See HPI for additional information.   GI: no complaints per category. See HPI for additional information.   : no complaints per category. See HPI for additional information.   Heme: no complaints per category. See HPI for additional information.       Objective:     Vitals:    07/09/25 1407   BP: 134/80   Pulse: 77   Weight: 116 lb (52.6 kg)   Height: 62\"         Body mass index is 21.22 kg/m².    General: AAO.NAD.   CVS exam: normal peripheral perfusion  Chest: non-labored breathing, no tachypnea   Breast:  symmetric, no dominant or suspicious mass, no skin or nipple changes and no axillary adenopathy  Abdominal exam:  soft, nontender, nondistended  Pelvic exam:   VULVA: normal appearing vulva with no masses, tenderness or lesions  PERINEUM:  normal appearing, no lesions   URETHRAL MEATUS:  normal appearing, no lesions   VAGINA: normal appearing  vagina with normal color and discharge, no lesions  CERVIX: surgically absent  UTERUS: surgically absent, vaginal cuff well healed  ADNEXA: no masses or tenderness.   PERIRECTAL: normal appearing, no lesions   Ext: non-tender, no edema    Assessment:     Julieta Goff is a 59 year old  female here for a well women exam.       Plan:       Problem List Items Addressed This Visit    None  Visit Diagnoses         Well woman exam with routine gynecological exam    -  Primary    Relevant Medications    Estradiol 10 MCG Vaginal Tab (Start on 7/10/2025)      Vaginal atrophy        Relevant Medications    Estradiol 10 MCG Vaginal Tab (Start on 7/10/2025)      Screening for cervical cancer        Relevant Medications    Estradiol 10 MCG Vaginal Tab (Start on 7/10/2025)    Other Relevant Orders    ThinPrep PAP with HPV Reflex Request      Encounter for screening mammogram for malignant neoplasm of breast        Relevant Orders    [facility] CARLEE 2D+3D SCREENING BILAT (CPT=77067/64049)      Vaginitis and vulvovaginitis        Relevant Medications    Estradiol 10 MCG Vaginal Tab (Start on 7/10/2025)    Other Relevant Orders    Vaginitis Vaginosis PCR Panel            Cervical cancer screening  - discussion held with the patient about ASCCP guidelines  - repeat vaginal pap smear today   Health maintenance  - encouraged to maintain weight at healthy BMI  - discussed importance of exercise and healthy eating  - self breast exam instructions provided   - bilateral screening mammogram recommendations discussed and order provided        Problem List Items Addressed This Visit    None  Visit Diagnoses         Well woman exam with routine gynecological exam    -  Primary    Relevant Medications    Estradiol 10 MCG Vaginal Tab (Start on 7/10/2025)      Vaginal atrophy        Relevant Medications    Estradiol 10 MCG Vaginal Tab (Start on 7/10/2025)      Screening for cervical cancer        Relevant Medications    Estradiol 10 MCG  Vaginal Tab (Start on 7/10/2025)    Other Relevant Orders    ThinPrep PAP with HPV Reflex Request      Encounter for screening mammogram for malignant neoplasm of breast        Relevant Orders    [facility] CARLEE 2D+3D SCREENING BILAT (CPT=77067/57541)      Vaginitis and vulvovaginitis        Relevant Medications    Estradiol 10 MCG Vaginal Tab (Start on 7/10/2025)    Other Relevant Orders    Vaginitis Vaginosis PCR Panel                  All of the findings and plan were discussed with the patient.  She notes understanding and agrees with the plan of care.  All questions were answered to the best of my ability at this time.      RTC in 1 year for a well woman exam or sooner if needed     Karla Chew MD   [facility] - OBGYN      Discussed with patient that there will not be further notification of normal or benign results other than receiving results on [employer]. A [employer] message or telephone call will be placed by the physician and/or office staff if results are abnormal.       Note to patient and family   The 21st Century Cures Act makes medical notes available to patients in the interest of transparency.  However, please be advised that this is a medical document.  It is intended as lhuq-gv-jekm communication.  It is written and medical language may contain abbreviations or verbiage that are technical and unfamiliar.  It may appear blunt or direct.  Medical documents are intended to carry relevant information, facts as evident, and the clinical opinion of the practitioner.      This note could include assistance by Dragon voice recognition. Errors in content may be related to improper recognition by the system; efforts to review and correct have been done but errors may still exist.

## 2025-07-09 NOTE — TELEPHONE ENCOUNTER
----- Message from Shad De MD sent at 2/5/2024  8:29 PM CST -----  Pls order bilateral screening breast Ultrasound for dense breasts     Patient notified okay for pap smear collection at her appointment today.  Patient verbalized understanding.

## 2025-07-10 ENCOUNTER — RESULTS FOLLOW-UP (OUTPATIENT)
Dept: OBGYN CLINIC | Facility: CLINIC | Age: 60
End: 2025-07-10

## 2025-07-10 DIAGNOSIS — B37.9 YEAST INFECTION: Primary | ICD-10-CM

## 2025-07-10 LAB
BV BACTERIA DNA VAG QL NAA+PROBE: NEGATIVE
C GLABRATA DNA VAG QL NAA+PROBE: NEGATIVE
C KRUSEI DNA VAG QL NAA+PROBE: NEGATIVE
CANDIDA DNA VAG QL NAA+PROBE: POSITIVE
T VAGINALIS DNA VAG QL NAA+PROBE: NEGATIVE

## 2025-07-10 RX ORDER — FLUCONAZOLE 150 MG/1
150 TABLET ORAL ONCE
Qty: 2 TABLET | Refills: 0 | Status: SHIPPED | OUTPATIENT
Start: 2025-07-10 | End: 2025-07-10

## 2025-07-11 DIAGNOSIS — Z51.81 THERAPEUTIC DRUG MONITORING: ICD-10-CM

## 2025-07-11 DIAGNOSIS — F33.1 MAJOR DEPRESSIVE DISORDER, RECURRENT EPISODE, MODERATE WITH ANXIOUS DISTRESS (HCC): ICD-10-CM

## 2025-07-11 DIAGNOSIS — F43.9 STRESS AT HOME: ICD-10-CM

## 2025-07-11 RX ORDER — BUPROPION HYDROCHLORIDE 300 MG/1
300 TABLET ORAL DAILY
Qty: 90 TABLET | Refills: 0 | OUTPATIENT
Start: 2025-07-11

## 2025-07-11 NOTE — TELEPHONE ENCOUNTER
Requested Prescriptions     Pending Prescriptions Disp Refills    BUPROPION  MG Oral Tablet 24 Hr [Pharmacy Med Name: BUPROPION XL 300MG TABLETS] 90 tablet 0     Sig: TAKE 1 TABLET(300 MG) BY MOUTH DAILY       LOV: [id number]   RTC:   Last Relevant Labs:   Filled: 7/9/25 #90 with 0 refills    Future Appointments   Date Time Provider Department Center   7/15/2025  9:00 AM Beverley Munoz DO [id number] [facility] [place]   7/15/2025  2:30 PM Padma Murcia APRN [id number]   7/18/2025  2:00 PM Shiv Powers MD ENIPain [facility] [place]   7/28/2025  2:30 PM Tung Do DO [facility] [address] [facility] [address]

## 2025-07-15 ENCOUNTER — OFFICE VISIT (OUTPATIENT)
Facility: LOCATION | Age: 60
End: 2025-07-15
Payer: MEDICAID

## 2025-07-15 ENCOUNTER — TELEMEDICINE (OUTPATIENT)
Facility: CLINIC | Age: 60
End: 2025-07-15
Payer: MEDICAID

## 2025-07-15 DIAGNOSIS — M85.859 OSTEOPENIA OF NECK OF FEMUR, UNSPECIFIED LATERALITY: Primary | ICD-10-CM

## 2025-07-15 DIAGNOSIS — E03.9 HYPOTHYROIDISM, UNSPECIFIED TYPE: ICD-10-CM

## 2025-07-15 DIAGNOSIS — K59.00 CONSTIPATION, UNSPECIFIED CONSTIPATION TYPE: ICD-10-CM

## 2025-07-15 DIAGNOSIS — N39.0 RECURRENT UTI: Primary | ICD-10-CM

## 2025-07-15 LAB
APPEARANCE: CLEAR
BILIRUBIN: NEGATIVE
GLUCOSE (URINE DIPSTICK): NEGATIVE MG/DL
KETONES (URINE DIPSTICK): NEGATIVE MG/DL
MULTISTIX LOT#: ABNORMAL NUMERIC
NITRITE, URINE: NEGATIVE
OCCULT BLOOD: NEGATIVE
PH, URINE: 6 (ref 4.5–8)
PROTEIN (URINE DIPSTICK): NEGATIVE MG/DL
SPECIFIC GRAVITY: 1.02 (ref 1–1.03)
URINE-COLOR: YELLOW
UROBILINOGEN,SEMI-QN: 0.2 MG/DL (ref 0–1.9)

## 2025-07-15 PROCEDURE — 99213 OFFICE O/P EST LOW 20 MIN: CPT

## 2025-07-15 PROCEDURE — 99214 OFFICE O/P EST MOD 30 MIN: CPT | Performed by: STUDENT IN AN ORGANIZED HEALTH CARE EDUCATION/TRAINING PROGRAM

## 2025-07-15 PROCEDURE — 81003 URINALYSIS AUTO W/O SCOPE: CPT

## 2025-07-15 RX ORDER — ESTRADIOL 0.1 MG/G
CREAM VAGINAL
Qty: 42.5 G | Refills: 5 | Status: SHIPPED | OUTPATIENT
Start: 2025-07-15

## 2025-07-15 RX ORDER — LEVOTHYROXINE SODIUM 75 UG/1
75 TABLET ORAL
Qty: 90 TABLET | Refills: 0 | Status: SHIPPED | OUTPATIENT
Start: 2025-07-15

## 2025-07-15 NOTE — PROGRESS NOTES
HPI:     Julieta Goff is a 59 year old female with hx of hypothyroidism, HTN, anxiety, depression, and Fhx of RCC who presents for [place] follow-up.     PCP: Dr. Tung CUNHA with [facility] Oct 2023. Reported doing well at the time, was on estrace cream x3/wk and cranberry supplement. Asymptomatic, pt was advised to follow up in 1 yr.     Today~  - still using the estrace cream but stopped doing it 3x/wk b/c sometimes it would burn.  Currently using 2x/wk w/o issues.   - denied bladder pressure burning, or pain  - still on cranberry supplement PO daily  - no new interventions for UTI prevention  - recently tx yeast infection  - asymptomatic currently, no dysuria, gross hematuria, low abd/flank pain, etc.     Baseline~  DF: Q3-4hr  NOC: x1-2, sometimes make struggle to go back to sleep  Fluids: ~ 1.5L water, teas. Occ Coke. No coffee or juice. Will have some water or tea right before bed  Bowels: as been constipated and bloated x3 wks now. She spoke w/her thyroid doctor about symptoms, they think symptoms maybe d/t thyroid levels as she is on lower end of normal TSH; they are changing her to brand name thyroid medication. Has been using Metamucil and occ Colace --> Metamucil seems to help, not so much with the Colace    UA today neg blood and nitrites, trace leuks    HISTORY:  Past Medical History[1]   Past Surgical History[2]   Family History[3]   Social History: Short Social Hx on File[4]     Medications (Active prior to today's visit):  Current Medications[5]    Allergies:  Allergies[6]    ROS:     A comprehensive 10 point review of systems was completed.  Pertinent positives and negatives noted in the the HPI.    PHYSICAL EXAM:     GENERAL APPEARANCE: well, developed, well nourished, in no acute distress  NEUROLOGIC: nonfocal, alert and oriented  HEAD: normocephalic, atraumatic  EYES: sclera non-icteric  EARS: hearing intact  ORAL CAVITY: mucosa moist  NECK/THYROID: no obvious goiter or masses  LUNGS:  nonlabored breathing  ABDOMEN: soft, no obvious masses or tenderness  SKIN: no obvious rashes     ASSESSMENT/PLAN:   Diagnoses and all orders for this visit:    Recurrent UTI  -     URINALYSIS, AUTO, W/O SCOPE    Constipation  - reviewed UTI prevention strategies including drinking at least 48-64 oz water daily, limiting bladder irritants, avoiding constipation, timed/double voiding, continuing OTC cranberry and consider adding OTC D-mannose and/or probiotics, etc.  - refill estrace cream  - tx constipation PRN: OTC Miralax/Metamucil, prune juice  - ER precautions    Follow-up: in 1 yr; sooner as needed    BRENT Jimenez  Department of Urology  [facility]       I have spent 22 min total time on the day of the encounter, including: review of chart including lab and imaging studies, obtaining and/or reviewing separately obtained history, performing a medically appropriate examination and/or evaluation, counseling and educating the patient/family/caregiver, ordering medications/tests/procedures, documenting clinical information in Epic, and independently interpreting results and communicating results to the patient/family/caregiver.          [1]   Past Medical History:   Allergic rhinitis    Anxiety    Arthritis    Asthma (HCC)    Constipation    Depression    Esophageal reflux    Fatty liver    2023 ultrasound of abdomen    Fetal and  jaundice    Hemorrhoids    Hyperlipidemia    Hyperthyroidism    IBS (irritable bowel syndrome)    Renal insufficiency    UTI (urinary tract infection)   [2]   Past Surgical History:  Procedure Laterality Date    Colonoscopy      Hysterectomy      -approximate          Other surgical history      Eye surgery    Total abdom hysterectomy      Tubal ligation     [3]   Family History  Problem Relation Age of Onset    Diabetes Father     Stroke Father     Depression Father     Colon Polyps Father     Obesity Sister    [4]   Social History  Socioeconomic  History    Marital status:    Tobacco Use    Smoking status: Never     Passive exposure: Never    Smokeless tobacco: Never   Vaping Use    Vaping status: Never Used   Substance and Sexual Activity    Alcohol use: Yes     Alcohol/week: 3.0 - 4.0 standard drinks of alcohol     Types: 3 - 4 Standard drinks or equivalent per week     Comment: occ    Drug use: Never    Sexual activity: Yes     Partners: Male     Birth control/protection: Hysterectomy   Other Topics Concern    Caffeine Concern No    Exercise No    Seat Belt Yes    Special Diet No    Stress Concern Yes    Weight Concern No     Service No    Blood Transfusions No    Occupational Exposure No    Hobby Hazards No    Sleep Concern No    Back Care No    Bike Helmet No    Self-Exams No   [5]   Current Outpatient Medications   Medication Sig Dispense Refill    UNITHROID 75 MCG Oral Tab Take 1 tablet (75 mcg total) by mouth before breakfast. 90 tablet 0    buPROPion  MG Oral Tablet 24 Hr TAKE 1 TABLET(300 MG) BY MOUTH DAILY 90 tablet 0    Estradiol 10 MCG Vaginal Tab Place 10 mcg vaginally twice a week. 24 tablet 3    LOSARTAN 50 MG Oral Tab TAKE 1 TABLET(50 MG) BY MOUTH TWICE DAILY 180 tablet 0    VALACYCLOVIR 1 G Oral Tab TAKE TWO TABLETS BY MOUTH AS ONE DOSE 2 tablet 0    MONTELUKAST 10 MG Oral Tab TAKE 1 TABLET(10 MG) BY MOUTH EVERY NIGHT 90 tablet 0    MELOXICAM 15 MG Oral Tab TAKE 1 TABLET(15 MG) BY MOUTH DAILY 30 tablet 0    Estradiol 10 MCG Vaginal Tab Place 10 mcg vaginally twice a week. 24 tablet 3    TRAZODONE 150 MG Oral Tab TAKE 1 TABLET(150 MG) BY MOUTH EVERY NIGHT AS NEEDED FOR SLEEP 90 tablet 1    pantoprazole 40 MG Oral Tab EC Take 1 tablet (40 mg total) by mouth every morning before breakfast. 90 tablet 3    ALPRAZolam 0.5 MG Oral Tab Take 1 tablet (0.5 mg total) by mouth daily as needed. 30 tablet 2    IBUPROFEN 600 MG Oral Tab TAKE 1 TABLET(600 MG) BY MOUTH EVERY 6 HOURS AS NEEDED FOR PAIN 40 tablet 0    Cyanocobalamin  (B-12 OR) Take by mouth.      fluconazole (DIFLUCAN) 150 MG Oral Tab Take 1 tablet (150 mg total) by mouth every third day. 2 tablet 0    cetirizine 10 MG Oral Tab Take 1 tablet (10 mg total) by mouth daily.      cholecalciferol 25 MCG (1000 UT) Oral Tab Take 1 tablet (1,000 Units total) by mouth daily.      SYMBICORT 80-4.5 MCG/ACT Inhalation Aerosol Inhale 2 puffs into the lungs 2 (two) times daily.      fluticasone propionate 50 MCG/ACT Nasal Suspension 1 spray in each nostril Nasally Once a day as needed      CRANBERRY OR Take 15 mg by mouth daily.      Lifitegrast (XIIDRA OP) Apply to eye 2 (two) times a day.      albuterol 108 (90 Base) MCG/ACT Inhalation Aero Soln Inhale 2 puffs into the lungs every 4 (four) hours as needed.      Ascorbic Acid (VITAMIN C) 100 MG Oral Tab Take 1 tablet (100 mg total) by mouth in the morning and 1 tablet (100 mg total) before bedtime.      zinc sulfate 220 (50 Zn) MG Oral Cap Take 25 mg by mouth daily.      Multiple Vitamins-Minerals (MULTI-VITAMIN/MINERALS) Oral Tab Take 1 tablet by mouth daily.     [6]   Allergies  Allergen Reactions    Epinephrine OTHER (SEE COMMENTS)    Cats Claw, Uncaria Tomentosa RASH    Dander RASH     Dogs      Dust Mites RASH    Grass RASH    Mold RASH

## 2025-07-15 NOTE — PATIENT INSTRUCTIONS
Urinary tract infections can affect your general health and your quality of life.  Some UTI’s can be prevented.  Here are some suggestions that my help prevent frequent UTI’s.     Good Hygiene: Always wipe front to back.  Don’t use perfumed soaps.  Plain soaps like Ivory are the best.  Don’t use washcloths.  Place soap directly on your hands and clean the genital area.  Rinse thoroughly.  Soap can be very irritating to the vaginal mucosa.     Urination: Urinate every 2-3 hours during the day.  Empty your bladder just before going to bed and  first thing when you wake up.  Make sure you go to the bathroom when you have a strong urge. Don't hover over the toilet to urinate.  The bladder may not be completely emptying when using this technique.  Sometimes you may need to \"double-void\".  After you finish urinating, stand up, then sit back down to urinate again.     Clothing: Wear cotton underwear. Change daily.  Avoid tight jeans.       [place]: Sometimes UTI’s are related to intercourse.  Wash genital area before and rinse afterwards.  Empty your bladder before and after intercourse.  Use of vaginal lubricants may be helpful.  Condoms and some lubricants may be irritating to the vaginal mucosa.  Spermicide should be avoided.  Talk to your doctor, you may require a suppressive antibiotic to take after intercourse.     Supplements: Take Vitamin C 500 or 1000mg daily.  Cranberry pills 400mg daily or if symptomatic 400mg two times per day.  Eat yogurt or consider taking a probiotic.  D-mannose 1 gram is another supplement that can help prevent infections.  This one is harder to find, but can be found at stores such as [employer], [employer], or a specialized vitamin store.  Fluid intake should be at least 48oz daily with the goal of 64oz daily.  Water is preferable.      Constipation: Constipation has been linked to UTI’s.  You should be having a soft BM daily.  Dietary fiber should be between 20-25 grams daily.   Flaxseed, All-Bran cereal, Fiber One bars, fruits and vegetables are a good sources of fiber.  Alternatively, Metamucil can be used daily.  Gentle laxatives and stool softeners may be added on a daily or as needed basis if necessary (Miralax, Colace, Pericolace).      Vaginal creams and moisturizers: It may be recommended you try vaginal creams, moisturizers or lubricants as a prevention method.  Over-the-Counter products:  Replens:  1 applicator three times per week  Luvena prebiotic vaginal moisturizer and lubricant:  package of 6.  1 tube every three days at bedtime.  Helps restore pH balance, decrease vaginal dryness, odor and itchiness.  KY liquibeads  KY silk - moisture enhancer  MeAgain Vaginal Moisturizer.  8 per package.  Use 1 daily for 7 days then 1 daily two times per week.  Astroglide  Prescriptions:  Estrace Cream  Premarin Cream  E-string     Prescription medications: Your doctor may recommend daily or as needed prescription medications including antibiotics to prevent urinary tract infection as well.

## 2025-07-15 NOTE — PATIENT INSTRUCTIONS
Thyroid  Switch to unithroid 75 mcg daily for 3 months  Repeat labs at that time  You can follow up with me at that time if you have further questions    Bone  Continue calcium and vitamin D  Repeat bone density 4/25/2026 onwards  I will follow up with you 5/2026  Please complete your labs prior to your follow up

## 2025-07-15 NOTE — PROGRESS NOTES
[facility] Clinic Note    Name: Julieta Goff    Date: 7/15/2025    Julieta Goff is a 59 year old female who presents for evaluation of osteoporosis management.     Chief complaint: No chief complaint on file.    Patient verbally consents to a Video/Telephone service for this visit.  Patient understands and accepts financial responsibility for any deductible, co-insurance and/or co-pays associated with this service.       Subjective:     Initial HPI 5/2024  Patient presents to the clinic for an initial bone health evaluation due to a history of DEXA confirmed osteopenia on 4/2024 DXA.     OSTEOPOROSIS RISK FACTORS ASSESSMENT  Postmenopausal DORA around 7-8 years ago (early 50's) on HRT for 1 year    Recent Fracture:    Fell around 1/2024 and diagnosed with right rib fractures that are not healing    Poor vision Nohx of cataract surgery; wearing glasses    History of Vit D insufficiency:   Current Vitamin D supplementation: No  D3 2000 international unit     Poor intake of calcium  Daily calcium intake: No  Calcium 1200 mg 2 tabs daily    Caffeine intake Yes, 3-4 glasses of tea + 1 can of coke daily   Alcohol intake >3/d:  Yes, on the weekend will ocassionally have 3 or more glasses of hard liquor   Hx of thyroid disease Yes, hypothyroidism diagnosed 20+ years ago and on LT4 75 mcg daily       Intake of medications that cause bone loss:    Long term steroid use: Yes, intermittently on steroid taper for asthma, last was 3 years ago  Aromatase inhibitor: No  Seizure medications: No  GnRH agonist: No  PPI: Yes, pantoprazole for 6 months   Lithium: No  SSRI: Yes, many years ago for 1 year  Actos/Avandia: No    Of note, lost around 70 pounds over a small amount of time   Treatment Contraindications:  Dysphagia: denies  Pain on swallowing: denies  Plans for dental procedures: around 4 months ago for cleaning      7/2024  Follows with nephrology  Following with orthopedic regarding rib fracture which is  healing   Notes intermittent rib pain   Continued on calcium and vitamin D supplement      Interval Hx 01/03/25  Labs: 12/24/2024 bone specific alk phos 11.1, , TSH 1.381, free T41.3, total calcium 9.5, creatinine 0.94, EGFR 69, albumin 4.0, Phos 3.5, vitamin D 62.8  Imaging: Last bone density April 2024 with lowest T-score of -1.7 in femoral neck   Continues on LT4 75 mcg daily  No falls or fractures since LOV   Denies kidney stones  Continues on calcium 1200 mg and vitamin D 2000 supplements   Has adequate dairy intake     Interval Hx 07/15/25  Labs: 6/2025 , BSAP 14.2, glucose 144, total ca 9.2   Notes over the last 3 weeks feeling more tired, weight gain, mental fog. No recent travels, sickness, medication changes  No falls or fractures since LOV   Denies kidney stones  Continues on calcium and vitamin D supplements  Continues on LT4 75 mcg daily      History/Other:    Allergies, PMH, SocHx and FHx reviewed and updated as appropriate in Epic on Current Medications[1]  Allergies[2]  Current Medications[3]  Past Medical History[4]  Family History[5]  Social history: Reviewed.      ROS/Exam    REVIEW OF SYSTEMS: Ten point review of systems has been performed and is otherwise negative and/or non-contributory, except as described above.     VITALS  There were no vitals filed for this visit.    Wt Readings from Last 6 Encounters:   07/09/25 116 lb (52.6 kg)   06/30/25 114 lb (51.7 kg)   02/05/25 114 lb (51.7 kg)   01/28/25 114 lb (51.7 kg)   07/02/24 114 lb (51.7 kg)   06/11/24 112 lb 6.4 oz (51 kg)       PHYSICAL EXAM- limited due to telemedicine encounter    Constitutional Not in acute distress  Pulmonary: no wheezing heard, no coughing on the phone. Speaking in full sentences.  Neurological:  Alert and oriented to person, place and time.   Psychiatric: Normal affect, mood and behavior appropriate      Labs/Imaging: Pertinent imaging reviewed.    12/24/2024 bone specific alk phos 11.1, , TSH  1.381, free T41.3, total calcium 9.5, creatinine 0.94, EGFR 69, albumin 4.0, Phos 3.5, vitamin D 62.8  6/26/2024 creatinine 1.06, EGFR 60, calcium 9.3, albumin 3.6, SPEP without monoclonal protein, TSH 0.9 with free T4 1.1, , bone specific alkaline phosphatase 11.3   3/2024 vit d 65, ionized calcium 4.9, pth 60  2/2024 TSH 0.566, , ca9.2, egfr 68 with cr 0.96    DXA Scans:  Date L2-L4 BMD T-score % change Mean Femoral Neck BMD T-score % change   4/24/2024 pf 1.075 0.3 0.664 -1.7   2/17/2023 pf 1.096 0.4 0.752 -0.9        Assessment & Plan:     ICD-10-CM    1. Osteopenia of neck of femur, unspecified laterality  M85.859 XR DEXA BONE DENSITOMETRY (CPT=77080)      2. Hypothyroidism, unspecified type  E03.9 UNITHROID 75 MCG Oral Tab     TSH and Free T4 [E]          Julieta Goff is a pleasant 59 year old female here for evaluation of:     #Osteopenia of neck of femur, unspecified laterality   Discussed pathophysiology of bone loss and clinical significance of DEXA scans with the patient.  The patient has confirmed osteopenia with low T-scores in the mean femoral neck. Diagnosed with right rib fractures 1/2024.  Explained the patient's risk factors for osteopenia include menopause at age 48, age, and SSRI hx. The patient is at high risk for future osteoporotic fractures if her osteoporosis remains untreated.  6/2024 secondary causes of osteoporosis, including SPEP, PTH, celiac screen, Alk Phos levels, and vitamin D levels WNL    Plan:  Discussed the importance of adopting lifestyle measures, such as adequate calcium and vitamin D intake, exercise, smoking cessation, counseling on fall prevention, and avoidance of heavy alcohol use, to reduce bone loss in postmenopausal women.  Suggested 1200 mg of elemental calcium daily (total diet plus supplement) and 2000 IU of vitamin D daily    Discussed available treatment options for osteoporosis, including bisphosphonates (oral vs. IV), anabolics, Evenity, and  Prolia injections, as well as their indications, risks, and benefits, including black box warnings.  Discussed concerns about an increased risk of vertebral fracture after discontinuation of denosumab, the need for indefinite administration of denosumab   We had an extensive discussion at LOV regarding the risks and benefits of therapy and the risks of no therapy.  We reviewed patient's FRAX score.  At this time patient would not like to start any pharmacotherapy.  We had discussed repeating labs prior to our follow-up visit to discuss if patient would like to start any pharmacologic therapy at that time.  12/2024 and 6/2025 with stable labs  Recommended DXA every two years for patients starting on therapy, with additional evaluation for contributing factors if a clinically significant BMD decrease or new fracture occurs. Due 4/2026  Repeat labs 4/2026 with follow up thereafter    #Hypothyroidism  Lab Results   Component Value Date    TSH 0.782 06/09/2025    T4F 1.2 06/09/2025    - Pathophysiology of condition, goals of therapy,  d/w pt in detail.    -Patient endorses fatigue and weight gain on generic LT4  -Recent TFTs with TSH WNL     -Patient is compliant with current LT4 administration and is taking it appropriately   -Discussed with pt the proper administration of LT4   -Once thyroid lab normalizes, associated symptoms directly related to the hypothyroidism should improve    Plan:  - med changes:Switch from generic to Unithroid Tabs - 75 MCG  - lab orders placed for 3 months and will discuss next steps once all labs result       Return in about 10 months (around 5/15/2026).with DXA and labs prior     The above plan was discussed in detail with the patient who verbalized understanding and agreement.      Beverley Munoz DO  [facility] Endocrinology  7/15/2025     In reviewing this note, please be advised that Dragon Voice Recognition software used to dictate the note may have made errors in recognizing some of the  words or phrases.     Note to patient: The 21 Century Cures Act makes medical notes like these available to patients in the interest of transparency. However, be advised this is a medical document. It is intended as peer to peer communication. It is written in medical language and may contain abbreviations or verbiage that are unfamiliar. It may appear blunt or direct. Medical documents are intended to carry relevant information, facts as evident, and the clinical opinion of the practitioner.            [1]    UNITHROID 75 MCG Oral Tab Take 1 tablet (75 mcg total) by mouth before breakfast. 90 tablet 0   [2]   Allergies  Allergen Reactions    Epinephrine OTHER (SEE COMMENTS)    Cats Claw, Uncaria [place]osa RASH    Dander RASH     Dogs      Dust Mites RASH    Grass RASH    Mold RASH   [3]   Current Outpatient Medications   Medication Sig Dispense Refill    UNITHROID 75 MCG Oral Tab Take 1 tablet (75 mcg total) by mouth before breakfast. 90 tablet 0    buPROPion  MG Oral Tablet 24 Hr TAKE 1 TABLET(300 MG) BY MOUTH DAILY 90 tablet 0    Estradiol 10 MCG Vaginal Tab Place 10 mcg vaginally twice a week. 24 tablet 3    LOSARTAN 50 MG Oral Tab TAKE 1 TABLET(50 MG) BY MOUTH TWICE DAILY 180 tablet 0    VALACYCLOVIR 1 G Oral Tab TAKE TWO TABLETS BY MOUTH AS ONE DOSE 2 tablet 0    MONTELUKAST 10 MG Oral Tab TAKE 1 TABLET(10 MG) BY MOUTH EVERY NIGHT 90 tablet 0    MELOXICAM 15 MG Oral Tab TAKE 1 TABLET(15 MG) BY MOUTH DAILY 30 tablet 0    Estradiol 10 MCG Vaginal Tab Place 10 mcg vaginally twice a week. 24 tablet 3    TRAZODONE 150 MG Oral Tab TAKE 1 TABLET(150 MG) BY MOUTH EVERY NIGHT AS NEEDED FOR SLEEP 90 tablet 1    pantoprazole 40 MG Oral Tab EC Take 1 tablet (40 mg total) by mouth every morning before breakfast. 90 tablet 3    ALPRAZolam 0.5 MG Oral Tab Take 1 tablet (0.5 mg total) by mouth daily as needed. 30 tablet 2    IBUPROFEN 600 MG Oral Tab TAKE 1 TABLET(600 MG) BY MOUTH EVERY 6 HOURS AS NEEDED FOR PAIN 40  tablet 0    Cyanocobalamin (B-12 OR) Take by mouth.      fluconazole (DIFLUCAN) 150 MG Oral Tab Take 1 tablet (150 mg total) by mouth every third day. 2 tablet 0    cetirizine 10 MG Oral Tab Take 1 tablet (10 mg total) by mouth daily.      cholecalciferol 25 MCG (1000 UT) Oral Tab Take 1 tablet (1,000 Units total) by mouth daily.      SYMBICORT 80-4.5 MCG/ACT Inhalation Aerosol Inhale 2 puffs into the lungs 2 (two) times daily.      fluticasone propionate 50 MCG/ACT Nasal Suspension 1 spray in each nostril Nasally Once a day as needed      CRANBERRY OR Take 15 mg by mouth daily.      Lifitegrast (XIIDRA OP) Apply to eye 2 (two) times a day.      albuterol 108 (90 Base) MCG/ACT Inhalation Aero Soln Inhale 2 puffs into the lungs every 4 (four) hours as needed.      Ascorbic Acid (VITAMIN C) 100 MG Oral Tab Take 1 tablet (100 mg total) by mouth in the morning and 1 tablet (100 mg total) before bedtime.      zinc sulfate 220 (50 Zn) MG Oral Cap Take 25 mg by mouth daily.      Multiple Vitamins-Minerals (MULTI-VITAMIN/MINERALS) Oral Tab Take 1 tablet by mouth daily.     [4]   Past Medical History:   Allergic rhinitis    Anxiety    Arthritis    Asthma (HCC)    Constipation    Depression    Esophageal reflux    Fatty liver    2023 ultrasound of abdomen    Fetal and  jaundice    Hemorrhoids    Hyperlipidemia    Hyperthyroidism    IBS (irritable bowel syndrome)    Renal insufficiency    UTI (urinary tract infection)   [5]   Family History  Problem Relation Age of Onset    Diabetes Father     Stroke Father     Depression Father     Colon Polyps Father     Obesity Sister

## 2025-07-16 LAB — HPV E6+E7 MRNA CVX QL NAA+PROBE: NEGATIVE

## 2025-07-18 ENCOUNTER — VIRTUAL PHONE E/M (OUTPATIENT)
Dept: PAIN CLINIC | Facility: CLINIC | Age: 60
End: 2025-07-18
Payer: MEDICAID

## 2025-07-18 DIAGNOSIS — G58.8 INTERCOSTAL NEURALGIA: Primary | ICD-10-CM

## 2025-07-18 PROCEDURE — G2252 BRIEF CHKIN BY MD/QHP, 11-20: HCPCS | Performed by: ANESTHESIOLOGY

## 2025-07-18 NOTE — PROGRESS NOTES
Virtual Telephone Check-In    Julieta Goff verbally consents to a Virtual/Telephone Check-In visit on 07/18/25.  Patient has been referred to the [facility] website at [email or web address] to review the yearly Consent to Treat document.    Patient understands and accepts financial responsibility for any deductible, co-insurance and/or co-pays associated with this service.    Duration of the service: 30 minutes      Summary of topics discussed:     The patient is a very pleasant 59-year-old female with history of chronic rib pain.  The patient is status post ultrasound-guided right paravertebral block with 80% improvement in pain.  This is chronic and ongoing.  Overall, patient is doing very well.  Discussed role for repeat injection in the future.  Discussed continuation of conservative management.  Patient voiced understanding will follow-up as needed.  Shiv Powers MD

## 2025-07-27 PROBLEM — F43.9 STRESS AT HOME: Status: RESOLVED | Noted: 2023-02-19 | Resolved: 2025-07-27

## 2025-07-27 PROBLEM — R73.01 IMPAIRED FASTING GLUCOSE: Status: RESOLVED | Noted: 2023-03-06 | Resolved: 2025-07-27

## 2025-07-27 PROBLEM — K21.9 GASTROESOPHAGEAL REFLUX DISEASE: Status: RESOLVED | Noted: 2023-02-01 | Resolved: 2025-07-27

## 2025-07-27 PROBLEM — R12 HEARTBURN: Status: RESOLVED | Noted: 2023-10-30 | Resolved: 2025-07-27

## 2025-07-27 PROBLEM — E78.6 HYPOCHOLESTEROLEMIA: Status: RESOLVED | Noted: 2023-03-06 | Resolved: 2025-07-27

## 2025-07-27 PROBLEM — M26.629 ARTHRALGIA OF TEMPOROMANDIBULAR JOINT: Status: RESOLVED | Noted: 2023-02-01 | Resolved: 2025-07-27

## 2025-07-27 PROBLEM — R63.4 UNINTENTIONAL WEIGHT LOSS: Status: RESOLVED | Noted: 2023-06-15 | Resolved: 2025-07-27

## 2025-07-27 PROBLEM — G58.8 INTERCOSTAL NEURALGIA: Status: RESOLVED | Noted: 2025-02-05 | Resolved: 2025-07-27

## 2025-07-27 PROBLEM — R87.810 HUMAN PAPILLOMAVIRUS (HPV) TYPE 16 DNA DETECTED IN CERVICAL SPECIMEN: Status: RESOLVED | Noted: 2024-06-17 | Resolved: 2025-07-27

## 2025-07-27 PROBLEM — J45.30 MILD PERSISTENT ASTHMA WITHOUT COMPLICATION (HCC): Status: ACTIVE | Noted: 2023-02-19

## 2025-07-28 ENCOUNTER — OFFICE VISIT (OUTPATIENT)
Dept: FAMILY MEDICINE CLINIC | Facility: CLINIC | Age: 60
End: 2025-07-28
Payer: MEDICAID

## 2025-07-28 VITALS
SYSTOLIC BLOOD PRESSURE: 122 MMHG | WEIGHT: 119 LBS | BODY MASS INDEX: 21.9 KG/M2 | HEART RATE: 70 BPM | RESPIRATION RATE: 16 BRPM | TEMPERATURE: 98 F | HEIGHT: 62 IN | OXYGEN SATURATION: 99 % | DIASTOLIC BLOOD PRESSURE: 72 MMHG

## 2025-07-28 DIAGNOSIS — Z84.1 FAMILY HISTORY OF CHRONIC KIDNEY DISEASE: ICD-10-CM

## 2025-07-28 DIAGNOSIS — F33.1 MAJOR DEPRESSIVE DISORDER, RECURRENT EPISODE, MODERATE WITH ANXIOUS DISTRESS (HCC): ICD-10-CM

## 2025-07-28 DIAGNOSIS — R87.811 HUMAN PAPILLOMAVIRUS (HPV) TYPE 16 DNA DETECTED IN VAGINAL SPECIMEN: ICD-10-CM

## 2025-07-28 DIAGNOSIS — R87.620 ASCUS WITH POSITIVE HIGH RISK HUMAN PAPILLOMAVIRUS OF VAGINA: ICD-10-CM

## 2025-07-28 DIAGNOSIS — K76.0 FATTY LIVER: ICD-10-CM

## 2025-07-28 DIAGNOSIS — Z23 NEED FOR PNEUMOCOCCAL 20-VALENT CONJUGATE VACCINATION: ICD-10-CM

## 2025-07-28 DIAGNOSIS — Z51.81 THERAPEUTIC DRUG MONITORING: ICD-10-CM

## 2025-07-28 DIAGNOSIS — I10 ESSENTIAL HYPERTENSION: ICD-10-CM

## 2025-07-28 DIAGNOSIS — Z00.00 WELL ADULT EXAM: Primary | ICD-10-CM

## 2025-07-28 DIAGNOSIS — R87.811 ASCUS WITH POSITIVE HIGH RISK HUMAN PAPILLOMAVIRUS OF VAGINA: ICD-10-CM

## 2025-07-28 DIAGNOSIS — E03.9 ACQUIRED HYPOTHYROIDISM: ICD-10-CM

## 2025-07-28 DIAGNOSIS — R87.810 ASCUS WITH POSITIVE HIGH RISK HPV CERVICAL: ICD-10-CM

## 2025-07-28 DIAGNOSIS — M85.80 OSTEOPENIA WITH HIGH RISK OF FRACTURE: ICD-10-CM

## 2025-07-28 DIAGNOSIS — J45.40 MODERATE PERSISTENT ASTHMA WITHOUT COMPLICATION (HCC): ICD-10-CM

## 2025-07-28 DIAGNOSIS — Z90.710 HISTORY OF HYSTERECTOMY: ICD-10-CM

## 2025-07-28 DIAGNOSIS — Z86.19 HISTORY OF HPV INFECTION: ICD-10-CM

## 2025-07-28 DIAGNOSIS — F43.9 STRESS AT HOME: ICD-10-CM

## 2025-07-28 DIAGNOSIS — R87.610 ASCUS WITH POSITIVE HIGH RISK HPV CERVICAL: ICD-10-CM

## 2025-07-28 DIAGNOSIS — I10 PRIMARY HYPERTENSION: ICD-10-CM

## 2025-07-28 RX ORDER — MULTIVITAMIN WITH IRON
50 TABLET ORAL DAILY
COMMUNITY

## 2025-07-28 RX ORDER — BUPROPION HYDROCHLORIDE 300 MG/1
300 TABLET ORAL DAILY
Qty: 90 TABLET | Refills: 3 | Status: SHIPPED | OUTPATIENT
Start: 2025-07-28

## 2025-07-28 RX ORDER — LORATADINE 10 MG/1
10 TABLET, ORALLY DISINTEGRATING ORAL DAILY
COMMUNITY

## 2025-07-28 RX ORDER — VIT A/VIT C/VIT E/ZINC/COPPER 7160-113
TABLET, DELAYED RELEASE (ENTERIC COATED) ORAL
COMMUNITY

## 2025-07-28 RX ORDER — TRAZODONE HYDROCHLORIDE 150 MG/1
150 TABLET ORAL NIGHTLY PRN
Qty: 90 TABLET | Refills: 1 | Status: SHIPPED | OUTPATIENT
Start: 2025-07-28

## 2025-07-28 NOTE — PROGRESS NOTES
Subjective:   Julieta Goff is a 59 year old female who presents for Physical       History/Other:   History of Present Illness    Julieta Goff is a 59 year old female who presents for follow-up on her chronic conditions and recent symptoms of bloating and constipation.    She experiences persistent soreness in her back, particularly when lifting heavy objects, despite receiving two injections at the pain clinic. The last injection provided longer relief than the first. She takes vitamin D and calcium supplements.    She is currently on 75 mcg of levothyroxine for hypothyroidism, with a TSH test scheduled three months from her last visit. For hypertension, she takes 50 mg of medication twice daily. Her allergist told her that her asthma is in remission, and she has not used her albuterol inhaler in a long time. She is no longer on Symbicort but continues with allergy pills and shots.    She takes trazodone nightly for sleep, which was last refilled by Dr. Navarro. She also takes 300 mg of bupropion for depression and anxiety, which she reports is controlled. She rarely uses Xanax.    She experiences bloating and weight gain, describing a sudden increase of six pounds over two to three days, accompanied by a bloated stomach. She has IBS, currently in a constipation phase, and uses Colace, enemas, and Metamucil for management. She has done a Cologuard test, which was normal.    She had a total hysterectomy due to abnormal cervical cells and heavy bleeding, and she is menopausal with no vaginal bleeding. Her recent Pap smear showed atypical cells, but HPV was negative, and she is scheduled for a follow-up in a year.    She takes vitamin D, vitamin C, zinc, and calcium supplements. Her diet is irregular due to IBS, eating only when hungry.      hypothyroidism. Continues unithroid and is stable at 75mcg dosing.    Xanax -   HTN- on losartan   Hx of depression, taking Buproprion 300 mg daily.   Pt on estradiol  for vaginal dryness.   History of a hysterectomy with oophorectomy in 2015. Previously diagnosed with HPV. Hysterectomy was 2/2 menorrhagia. Hx of precancerous cells in cervix perhaps in her 20s.       She has hx of anemia years ago 2/2 heavy periods.   No LMP recorded. Patient has had a hysterectomy.    Hx of asthma - no longer on symbicort.     Hx of IBS - intermittent constipation and diarrhea.       PMHx: HTN, GERD, depression, asthma, hypothyroidism,   PSHx: hysterectomy w/oophorectomy, rotator cuff surgery  FMHx:  -maternal: none  -paternal: dad has DM. TIAs. HTN, GERD  Smoking: none  Alcohol: weekends, socially     Drugs: none   Sexual hx: 1 partner;   Occupation: home health.   Mammogram: scheduled for 2026  Colonoscopy: cologuard 2025 negative   Pap smear: follows gyn ASCUS with negative HPV - on most recent pap - will f/u in 1 year.   Tdap: UTD     Chief Complaint Reviewed and Verified  No Further Nursing Notes to   Review  Tobacco Reviewed  Allergies Reviewed  Medications Reviewed    Problem List Reviewed  Medical History Reviewed  Surgical History   Reviewed  Family History Reviewed  Social History Reviewed         Tobacco:  She has never smoked tobacco.    Current Medications[1]    PHQ-2 SCORE: 0  , done 7/28/2025   Last [place] Suicide Screening on 7/28/2025 was No Risk.       Review of Systems:  Pertinent items are noted in HPI.      Objective:   /72   Pulse 70   Temp 97.8 °F (36.6 °C) (Temporal)   Resp 16   Ht 5' 2\" (1.575 m)   Wt 119 lb (54 kg)   SpO2 99%   BMI 21.77 kg/m²  Estimated body mass index is 21.77 kg/m² as calculated from the following:    Height as of this encounter: 5' 2\" (1.575 m).    Weight as of this encounter: 119 lb (54 kg).  Results  DIAGNOSTIC  Cologuard: Negative for colorectal cancer    PATHOLOGY  Pap smear: Atypical squamous cells of undetermined significance (ASC-US), HPV negative     Physical Exam  GENERAL: Alert, cooperative, well developed, no acute  distress  HEENT: Normocephalic, normal oropharynx, moist mucous membranes  CHEST: Clear to auscultation bilaterally, No wheezes, rhonchi, or crackles  CARDIOVASCULAR: Normal heart rate and rhythm, S1 and S2 normal without murmurs  ABDOMEN: Soft, non-tender, non-distended, without organomegaly, Normal bowel sounds  EXTREMITIES: No cyanosis or edema  NEUROLOGICAL: Cranial nerves grossly intact, Moves all extremities without gross motor or sensory deficit      Assessment & Plan:   1. Well adult exam (Primary)  -     CBC With Differential With Platelet; Future; Expected date: 07/28/2025  -     Iron And Tibc; Future; Expected date: 07/28/2025  -     Ferritin; Future; Expected date: 07/28/2025  -     Comp Metabolic Panel (14); Future; Expected date: 07/28/2025  -     Lipid Panel; Future; Expected date: 07/28/2025  2. Essential hypertension  3. Moderate persistent asthma without complication (HCC)  4. ASCUS with positive high risk human papillomavirus of vagina  5. Osteopenia with high risk of fracture  6. History of hysterectomy  7. Acquired hypothyroidism  8. Major depressive disorder, recurrent episode, moderate with anxious distress (HCC)  -     traZODone HCl; Take 1 tablet (150 mg total) by mouth nightly as needed for Sleep.  Dispense: 90 tablet; Refill: 1  -     buPROPion HCl ER (XL); Take 1 tablet (300 mg total) by mouth daily.  Dispense: 90 tablet; Refill: 3  9. History of HPV infection  10. Fatty liver  Overview:  7/27/2023 ultrasound of abdomen  Orders:  -     Comp Metabolic Panel (14); Future; Expected date: 07/28/2025  -     Lipid Panel; Future; Expected date: 07/28/2025  11. Human papillomavirus (HPV) type 16 DNA detected in vaginal specimen  12. ASCUS with positive high risk HPV cervical  13. Family history of chronic kidney disease  Overview:  Mother's CKD worse than Father's CKD    14. Need for pneumococcal 20-valent conjugate vaccination  -     PCV20 (Prevnar 20)  15. Primary hypertension  16. Stress at  home  -     traZODone HCl; Take 1 tablet (150 mg total) by mouth nightly as needed for Sleep.  Dispense: 90 tablet; Refill: 1  -     buPROPion HCl ER (XL); Take 1 tablet (300 mg total) by mouth daily.  Dispense: 90 tablet; Refill: 3  17. Therapeutic drug monitoring  -     traZODone HCl; Take 1 tablet (150 mg total) by mouth nightly as needed for Sleep.  Dispense: 90 tablet; Refill: 1  -     buPROPion HCl ER (XL); Take 1 tablet (300 mg total) by mouth daily.  Dispense: 90 tablet; Refill: 3    Assessment & Plan  Chronic Back Pain  Persistent pain post-rib injury, relieved by injections.  - Encourage vitamin D and calcium supplements.    Irritable Bowel Syndrome (IBS) with Constipation  Intermittent bloating and constipation, possible IBS flare. Discussed colonoscopy for further evaluation.  - Refer to GI specialist.  - Consider colonoscopy.    Depression and Anxiety  Controlled with bupropion and trazodone. Rare Xanax use.  - Refill bupropion for one year.  - Refill trazodone PRN.    Hypertension  Diagnosis not documented.  - Document hypertension diagnosis.  - Continue current antihypertensive regimen.    Hypothyroidism  On levothyroxine. Monitored by Dr. Munoz.  - Continue levothyroxine 75 mcg.  - Monitor TSH as per Dr. Munoz.    Anemia  No vaginal bleeding, post-menopausal.  - Order anemia panel.    General Health Maintenance  Up to date with vaccinations. Recent Pap smear showed atypical cells, negative HPV. Mammogram scheduled.  - Schedule follow-up Pap smear in one year.  - Ensure mammogram completion.  - Recommend Centrum or One A Day Women's 50+ multivitamin.  - Encourage consistent dietary habits.    Follow-up  Plans discussed for conditions and screenings.  - Schedule GI specialist appointment.  - Schedule anemia panel and kidney function tests with fasting for lipid panel.  - Follow-up on blood work results.        No follow-ups on file.        Tung Do DO, 7/27/2025, 11:25 PM           The  following individual(s) verbally consented to be recorded using ambient AI listening technology and understand that they can each withdraw their consent to this listening technology at any point by asking the clinician to turn off or pause the recording:    Patient name: Julieta Goff  Additional names:    Tung DO Hi        This note was prepared using Dragon Medical voice recognition dictation software. As a result errors may occur. When identified these errors have been corrected. While every attempt is made to correct errors during dictation discrepancies may still exist.      Note to patient: The 21st Century Cures Act makes medical notes like these available to patients in the interest of transparency. However, be advised this is a medical document. It is intended as peer to peer communication. It is written in medical language and may contain abbreviations or verbiage that are unfamiliar. It may appear blunt or direct. Medical documents are intended to carry relevant information, facts as evident, and the clinical opinion of the practitioner.               [1]   Current Outpatient Medications   Medication Sig Dispense Refill    Multiple Vitamins-Minerals (ICAPS AREDS FORMULA) Oral Tab Take by mouth.      loratadine 10 MG Oral Tablet Dispersible Take 1 tablet (10 mg total) by mouth daily.      vitamin B-12 50 MCG Oral Tab Take 1 tablet (50 mcg total) by mouth daily.      Misc Natural Products (MAGIC MUSHROOM MIX OR) Take by mouth.      Cholecalciferol (VITAMIN D3) 25 MCG (1000 UT) Oral Cap Take 1 tablet by mouth daily.      traZODone 150 MG Oral Tab Take 1 tablet (150 mg total) by mouth nightly as needed for Sleep. 90 tablet 1    buPROPion  MG Oral Tablet 24 Hr Take 1 tablet (300 mg total) by mouth daily. 90 tablet 3    UNITHROID 75 MCG Oral Tab Take 1 tablet (75 mcg total) by mouth before breakfast. 90 tablet 0    estradiol (ESTRACE) 0.1 MG/GM Vaginal Cream Apply a small (pea-sized) amount  or ~ 1 gram to vaginal area three times weekly. 42.5 g 5    Estradiol 10 MCG Vaginal Tab Place 10 mcg vaginally twice a week. 24 tablet 3    LOSARTAN 50 MG Oral Tab TAKE 1 TABLET(50 MG) BY MOUTH TWICE DAILY 180 tablet 0    VALACYCLOVIR 1 G Oral Tab TAKE TWO TABLETS BY MOUTH AS ONE DOSE 2 tablet 0    MONTELUKAST 10 MG Oral Tab TAKE 1 TABLET(10 MG) BY MOUTH EVERY NIGHT 90 tablet 0    pantoprazole 40 MG Oral Tab EC Take 1 tablet (40 mg total) by mouth every morning before breakfast. 90 tablet 3    ALPRAZolam 0.5 MG Oral Tab Take 1 tablet (0.5 mg total) by mouth daily as needed. 30 tablet 2    cetirizine 10 MG Oral Tab Take 1 tablet (10 mg total) by mouth daily.      fluticasone propionate 50 MCG/ACT Nasal Suspension 1 spray in each nostril Nasally Once a day as needed      Lifitegrast (XIIDRA OP) Apply to eye 2 (two) times a day.      albuterol 108 (90 Base) MCG/ACT Inhalation Aero Soln Inhale 2 puffs into the lungs every 4 (four) hours as needed.      zinc sulfate 220 (50 Zn) MG Oral Cap Take 25 mg by mouth daily.      IBUPROFEN 600 MG Oral Tab TAKE 1 TABLET(600 MG) BY MOUTH EVERY 6 HOURS AS NEEDED FOR PAIN 40 tablet 0    Cyanocobalamin (B-12 OR) Take by mouth.      cholecalciferol 25 MCG (1000 UT) Oral Tab Take 1 tablet (1,000 Units total) by mouth daily.      CRANBERRY OR Take 15 mg by mouth daily.      Ascorbic Acid (VITAMIN C) 100 MG Oral Tab Take 1 tablet (100 mg total) by mouth in the morning and 1 tablet (100 mg total) before bedtime.

## 2025-07-29 NOTE — PATIENT INSTRUCTIONS
619  Subjective Finding:    Chief compalint: Patient presents with:  Back Pain  Neck Pain  , Pain Scale: 5/10, Intensity: sharp, Duration:  5 days, Radiating: no.    Date of injury:     Activities that the pain restricts:   Home/household/hobbies/social activities: yes.  Work duties: yes.  Sleep: no.  Makes symptoms better: rest.  Makes symptoms worse: activity, cervical extension and cervical flexion.  Have you seen anyone else for the symptoms? No.  Work related: no.  Automobile related injury: no.    Objective and Assessment:    Posture Analysis:   High shoulder: .  Head tilt: .  High iliac crest: .  Head carriage: forward.  Thoracic Kyphosis: neutral.  Lumbar Lordosis: neutral.    Lumbar Range of Motion: .  Cervical Range of Motion: flexion decreased and extension decreased.  Thoracic Range of Motion: .  Extremity Range of Motion: .    Palpation:       Segmental dysfunction pre-treatment and treatment area:C567    T10   L23      Assessment post-treatment:  Cervical: ROM increased.  Thoracic: ROM increased.  Lumbar: .    Comments: .      Complicating Factors: .    Procedure(s):  CMT:  24322 Chiropractic manipulative treatment 1-2 regions performed   C and T spine    Modalities:  None performed this visit    Therapeutic procedures:  None    Plan:  Treatment plan: PRN.  Instructed patient: stretch as instructed at visit.  Short term goals: increase ROM.  Long term goals: increase ADL.  Prognosis: excellent.              VISIT SUMMARY:  During your visit, we discussed your chronic conditions and recent symptoms of bloating and constipation. We reviewed your current medications and supplements, and addressed your back pain, IBS, depression, anxiety, hypertension, hypothyroidism, and general health maintenance.    YOUR PLAN:  -CHRONIC BACK PAIN: You have persistent back pain, especially when lifting heavy objects. The injections you received have provided some relief. Continue taking vitamin D and calcium supplements to support bone health.    -IRRITABLE BOWEL SYNDROME (IBS) WITH CONSTIPATION: IBS is a condition that affects your digestive system, causing symptoms like bloating and constipation. We discussed the possibility of a colonoscopy for further evaluation and referred you to a GI specialist.    -DEPRESSION AND ANXIETY: Your depression and anxiety are currently controlled with bupropion and trazodone. We have refilled your bupropion for one year and trazodone as needed.    -HYPERTENSION: Hypertension, or high blood pressure, is being managed with your current medication. We documented your diagnosis and you should continue your current regimen.    -HYPOTHYROIDISM: Hypothyroidism is a condition where your thyroid does not produce enough hormones. You are on levothyroxine and will continue with your current dose. Your TSH levels will be monitored as planned.    -ANEMIA: Anemia is a condition where you do not have enough healthy red blood cells. We have ordered an anemia panel to check your levels.    -GENERAL HEALTH MAINTENANCE: You are up to date with your vaccinations. Your recent Pap smear showed atypical cells but was negative for HPV. A follow-up Pap smear is scheduled for next year. Ensure you complete your scheduled mammogram. We recommend taking a multivitamin like Centrum or One A Day Women's 50+ and encourage consistent dietary habits.    INSTRUCTIONS:  Please schedule an appointment with a GI specialist for further  evaluation of your IBS. Also, schedule an anemia panel and kidney function tests, and ensure you fast for the lipid panel. Follow up on your blood work results as discussed.    Contains text generated by Rylan

## 2025-08-04 DIAGNOSIS — J30.1 ALLERGIC RHINITIS DUE TO POLLEN, UNSPECIFIED SEASONALITY: ICD-10-CM

## 2025-08-04 DIAGNOSIS — J45.40 MODERATE PERSISTENT ASTHMA WITHOUT COMPLICATION (HCC): ICD-10-CM

## 2025-08-05 RX ORDER — MONTELUKAST SODIUM 10 MG/1
10 TABLET ORAL NIGHTLY
Qty: 90 TABLET | Refills: 1 | Status: SHIPPED | OUTPATIENT
Start: 2025-08-05

## 2025-08-20 DIAGNOSIS — I10 ESSENTIAL HYPERTENSION: ICD-10-CM

## 2025-08-21 DIAGNOSIS — Z51.81 THERAPEUTIC DRUG MONITORING: ICD-10-CM

## 2025-08-21 DIAGNOSIS — F33.1 MAJOR DEPRESSIVE DISORDER, RECURRENT EPISODE, MODERATE WITH ANXIOUS DISTRESS (HCC): ICD-10-CM

## 2025-08-21 DIAGNOSIS — F43.9 STRESS AT HOME: ICD-10-CM

## 2025-08-21 RX ORDER — LOSARTAN POTASSIUM 50 MG/1
50 TABLET ORAL 2 TIMES DAILY
Qty: 180 TABLET | Refills: 0 | Status: SHIPPED | OUTPATIENT
Start: 2025-08-21

## 2025-08-26 RX ORDER — ALPRAZOLAM 0.5 MG
0.5 TABLET ORAL
Qty: 30 TABLET | Refills: 0 | Status: SHIPPED | OUTPATIENT
Start: 2025-08-26

## (undated) DEVICE — KIT ENDO ORCAPOD 160/180/190

## (undated) DEVICE — KIT CLEAN ENDOKIT 1.1OZ GOWNX2

## (undated) DEVICE — MEDI-VAC NON-CONDUCTIVE SUCTION TUBING 6MM X 1.8M (6FT.) L: Brand: CARDINAL HEALTH

## (undated) DEVICE — FORCEPS BX L240CM DIA2.4MM L NDL RAD JAW 4

## (undated) DEVICE — CONMED SCOPE SAVER BITE BLOCK, 20X27 MM: Brand: SCOPE SAVER

## (undated) DEVICE — 60 ML SYRINGE REGULAR TIP: Brand: MONOJECT

## (undated) DEVICE — LINE MNTR ADLT SET O2 INTMD

## (undated) NOTE — LETTER
Patient Name: Julieta Goff        : 1965       Medical Record #: [id number]    CONSENT FOR PROCEDURES/SEDATION    Date: 2025       Time: 9:49 AM        1. I authorize the performance upon Julieta Goff the following:    ___RIGHT SIDE INTERCOSTAL NERVE BLOCK X 3 LEVELS WITH US_____    2. I authorize Dr. Powers (and whomever is designated as the doctor’s assistant), to perform the above mentioned procedures.    3. If any unforeseen conditions arise during this procedure calling for additional procedures, operations, or medications (including anesthesia and blood transfusion), I  further request and authorize the doctor to do whatever he/she deems advisable in my interest.    4. I consent to the taking and reproduction of any photographs in the course of this procedure for professional purposes.    5. I consent to the administration of such sedation as may be considered necessary or advisable by the physician responsible for this service, with the exception of  _____________________________.    6. I have been informed by my doctor of the nature and purpose of this procedure/sedation, possible alternative methods of treatment, risk involved and possible complications.      Signature of Patient:  ___________________________    Signature of person authorized to consent for patient: Relationship to patient:  ___________________________    ___________________    Witness: ____________________     Date: ______________    Provider: ____________________     Date: ______________

## (undated) NOTE — LETTER
Date: 5/2/2024    Patient Name: Julieta Goff          To Whom it may concern:    This letter has been written at the patient's request. The above patient was seen at [facility] for treatment of a medical condition.    This patient should be excused from returning to work until she is cleared by Cardiothoracic Surgeon.        Sincerely,    Tung Do, DO

## (undated) NOTE — LETTER
Julieta Goff, :1965    CONSENT FOR PROCEDURE/SEDATION    1. I authorize the performance upon Julieta Goff  the following: Colposcopy with biopsy and Endocervical curettage    2. I authorize Dr. Karla Chew MD (and whomever is designated as the doctor’s assistant), to perform the above-mentioned procedures.    3. If any unforeseen conditions arise during this procedure calling for additional  procedures, operations, or medications (including anesthesia and blood transfusion), I further request and authorize the doctor to do whatever he/she deems advisable in my interest.    4. I consent to the taking and reproduction of any photographs in the course of this procedure for professional purposes.    5. I consent to the administration of such sedation as may be considered necessary or advisable by the physician responsible for this service, with the exception of ______________________________________________________    6. I have been informed by my doctor of the nature and purpose of this procedure sedation, possible alternative methods of treatment, risk involved and possible complications.    7. If I have a Do Not Resuscitate (DNR) order in place, the physician and I (or the individual authorized to consent on my behalf) will discuss and agree as to whether the Do Not Resuscitate (DNR) order will remain in effect or will be discontinued during the performance of the procedure and the applicable recovery period. If the Do Not Resuscitate (DNR) order is discontinued and is to be reinstated following the procedure/recovery period, the physician will determine when the applicable recovery period ends for purposes of reinstating the Do Not Resuscitate (DNR) order.    Signature of Patient:_______________________________________________    Signature of person authorized to consent for patient:  _______________________________________________________________    Relationship to patient:  ____________________________________________    Witness: _________________________________________ Date:___________     Physician Signature: _______________________________ Date:___________

## (undated) NOTE — LETTER
Date & Time: 2/4/2024, 2:20 PM  Patient: Julieta Goff  Encounter Provider(s):    Brett Holt MD Brooks, Sarah, PA-C       To Whom It May Concern:    Julieta Goff was seen and treated in our department on 2/4/2024. She should not return to work until 2/12/24 .    If you have any questions or concerns, please do not hesitate to call.        _____________________________  Physician/APC Signature

## (undated) NOTE — LETTER
Date & Time: 10/21/2023, 10:56 AM  Patient: Joellen Canales  Encounter Provider(s):    Imer Boyce MD       To Whom It May Concern:    Diann Li was seen and treated in our department on 10/21/2023. Please allow her to sit at work with leg elevated for a week.     If you have any questions or concerns, please do not hesitate to call.        _____________________________  Physician/APC Signature

## (undated) NOTE — LETTER
5/24/2024          To Whom It May Concern:    Julieta Goff is currently under my medical care and may return to work on Tuesday 5/28/2024 with No restrictions.      If you require additional information please contact our office.        Sincerely,    Jose Alfredo Shah MD

## (undated) NOTE — LETTER
Date: 2/12/2024    Patient Name: Julieta Goff          To Whom it may concern:    This letter has been written at the patient's request. The above patient was seen at the [facility] for treatment of a medical condition.    This patient should be excused from any  lifting > 5 lbs for 4 weeks duration. She may return to work otherwise.         Sincerely,    Tung Do, DO

## (undated) NOTE — LETTER
[address] [facility] [address] [place] [address], [place]  Authorization for Surgical Operation and Procedure                                                                                           I hereby authorize Jayne Mcdaniel MD, my physician and his/her assistants (if applicable), which may include medical students, residents, and/or fellows, to perform the following surgical operation/ procedure and administer such anesthesia as may be determined necessary by my physician: Operation/Procedure name (s) ESOPHAGOGASTRODUODENOSCOPY (EGD) on [employer]   2. I recognize that during the surgical operation/procedure, unforeseen conditions may necessitate additional or different procedures than those listed above. I, therefore, further authorize and request that the above-named surgeon, assistants, or designees perform such procedures as are, in their judgment, necessary and desirable. 3.   My surgeon/physician has discussed prior to my surgery the potential benefits, risks and side effects of this procedure; the likelihood of achieving goals; and potential problems that might occur during recuperation. They also discussed reasonable alternatives to the procedure, including risks, benefits, and side effects related to the alternatives and risks related to not receiving this procedure. I have had all my questions answered and I acknowledge that no guarantee has been made as to the result that may be obtained. 4.   Should the need arise during my operation/procedure, which includes change of level of care prior to discharge, I also consent to the administration of blood and/or blood products. Further, I understand that despite careful testing and screening of blood or blood products by collecting agencies, I may still be subject to ill effects as a result of receiving a blood transfusion and/or blood products.   The following are some, but not all, of the potential risks that can occur: fever and allergic reactions, hemolytic reactions, transmission of diseases such as Hepatitis, AIDS and Cytomegalovirus (CMV) and fluid overload. In the event that I wish to have an autologous transfusion of my own blood, or a directed donor transfusion, I will discuss this with my physician. Check only if Refusing Blood or Blood Products  I understand refusal of blood or blood products as deemed necessary by my physician may have serious consequences to my condition to include possible death. I hereby assume responsibility for my refusal and release the hospital, its personnel, and my physicians from any responsibility for the consequences of my refusal.    o  Refuse   5. I authorize the use of any specimen, organs, tissues, body parts or foreign objects that may be removed from my body during the operation/procedure for diagnosis, research or teaching purposes and their subsequent disposal by hospital authorities. I also authorize the release of specimen test results and/or written reports to my treating physician on the hospital medical staff or other referring or consulting physicians involved in my care, at the discretion of the Pathologist or my treating physician. 6.   I consent to the photographing or videotaping of the operations or procedures to be performed, including appropriate portions of my body for medical, scientific, or educational purposes, provided my identity is not revealed by the pictures or by descriptive texts accompanying them. If the procedure has been photographed/videotaped, the surgeon will obtain the original picture, image, videotape or CD. The hospital will not be responsible for storage, release or maintenance of the picture, image, tape or CD.    7.   I consent to the presence of a  or observers in the operating room as deemed necessary by my physician or their designees.     8.   I recognize that in the event my procedure results in extended X-Ray/fluoroscopy time, I may develop a skin reaction. 9. If I have a Do Not Attempt Resuscitation (DNAR) order in place, that status will be suspended while in the operating room, procedural suite, and during the recovery period unless otherwise explicitly stated by me (or a person authorized to consent on my behalf). The surgeon or my attending physician will determine when the applicable recovery period ends for purposes of reinstating the DNAR order. 10. Patients having a sterilization procedure: I understand that if the procedure is successful the results will be permanent and it will therefore be impossible for me to inseminate, conceive, or bear children. I also understand that the procedure is intended to result in sterility, although the result has not been guaranteed. 11. I acknowledge that my physician has explained sedation/analgesia administration to me including the risk and benefits I consent to the administration of sedation/analgesia as may be necessary or desirable in the judgment of my physician. I CERTIFY THAT I HAVE READ AND FULLY UNDERSTAND THE ABOVE CONSENT TO OPERATION and/or OTHER PROCEDURE.     _________________________________________ _________________________________     ___________________________________  Signature of Patient     Signature of Responsible Person                   Printed Name of Responsible Person                              _________________________________________ ______________________________        ___________________________________  Signature of Witness         Date  Time         Relationship to Patient    STATEMENT OF PHYSICIAN My signature below affirms that prior to the time of the procedure; I have explained to the patient and/or his/her legal representative, the risks and benefits involved in the proposed treatment and any reasonable alternative to the proposed treatment.  I have also explained the risks and benefits involved in refusal of the proposed treatment and alternatives to the proposed treatment and have answered the patient's questions.  If I have a significant financial interest in a co-management agreement or a significant financial interest in any product or implant, or other significant relationship used in this procedure/surgery, I have disclosed this and had a discussion with my patient.     _______________________________________________________________ _____________________________  Katty Dill Physician)                                                                                         (Date)                                   (Time)  Patient Name: Malena John    : 1965   Printed: 10/27/2023      Medical Record #: [id number]                                              Page 1 of 1

## (undated) NOTE — LETTER
10/23/23        Date:     Patient Name: Shankar Perdue        To Whom it may concern: This letter has been written at the patient's request. The above patient was seen at the [facility] for treatment of a medical condition. This patient should be excused from work through the remainder of this week. The patient may return on October 30 with the following limitations: No restrictions .         Sincerely,        Torri Mclaughlin MD, [address] Orthopedic Surgery  Phone [phone]  Fax [phone]

## (undated) NOTE — LETTER
Patient Name: Julieta Goff        : 1965       Medical Record #: [id number]    CONSENT FOR PROCEDURES/SEDATION    Date: 2025       Time: 8:44 AM        1. I authorize the performance upon Julieta Goff the following:    ______RIGHT T8-T9 PARAVERTEBRAL BLOCK WITH ULTRASOUND__________    2. I authorize Dr. Powers (and whomever is designated as the doctor’s assistant), to perform the above mentioned procedures.    3. If any unforeseen conditions arise during this procedure calling for additional procedures, operations, or medications (including anesthesia and blood transfusion), I  further request and authorize the doctor to do whatever he/she deems advisable in my interest.    4. I consent to the taking and reproduction of any photographs in the course of this procedure for professional purposes.    5. I consent to the administration of such sedation as may be considered necessary or advisable by the physician responsible for this service, with the exception of  _____________________________.    6. I have been informed by my doctor of the nature and purpose of this procedure/sedation, possible alternative methods of treatment, risk involved and possible complications.      Signature of Patient:  ___________________________    Signature of person authorized to consent for patient: Relationship to patient:  ___________________________    ___________________    Witness: ____________________     Date: ______________    Provider: ____________________     Date: ______________